# Patient Record
Sex: FEMALE
[De-identification: names, ages, dates, MRNs, and addresses within clinical notes are randomized per-mention and may not be internally consistent; named-entity substitution may affect disease eponyms.]

---

## 2017-07-29 ENCOUNTER — HOSPITAL ENCOUNTER (OUTPATIENT)
Dept: HOSPITAL 14 - H.ER | Age: 62
Setting detail: OBSERVATION
LOS: 3 days | Discharge: HOME | End: 2017-08-01
Attending: FAMILY MEDICINE | Admitting: FAMILY MEDICINE
Payer: MEDICARE

## 2017-07-29 DIAGNOSIS — F31.9: ICD-10-CM

## 2017-07-29 DIAGNOSIS — F41.1: ICD-10-CM

## 2017-07-29 DIAGNOSIS — Z79.899: ICD-10-CM

## 2017-07-29 DIAGNOSIS — Z91.14: ICD-10-CM

## 2017-07-29 DIAGNOSIS — E78.5: ICD-10-CM

## 2017-07-29 DIAGNOSIS — J45.901: Primary | ICD-10-CM

## 2017-07-29 DIAGNOSIS — Z82.5: ICD-10-CM

## 2017-07-29 DIAGNOSIS — Z82.41: ICD-10-CM

## 2017-07-29 DIAGNOSIS — I10: ICD-10-CM

## 2017-07-29 DIAGNOSIS — R07.89: ICD-10-CM

## 2017-07-29 DIAGNOSIS — Z83.3: ICD-10-CM

## 2017-07-29 DIAGNOSIS — Z82.49: ICD-10-CM

## 2017-07-29 DIAGNOSIS — E11.9: ICD-10-CM

## 2017-07-29 DIAGNOSIS — J44.9: ICD-10-CM

## 2017-07-29 LAB
ALBUMIN/GLOB SERPL: 1.3 {RATIO} (ref 1–2.1)
ALP SERPL-CCNC: 93 U/L (ref 38–126)
ALT SERPL-CCNC: 57 U/L (ref 9–52)
AST SERPL-CCNC: 38 U/L (ref 14–36)
BASE EXCESS BLDV CALC-SCNC: 3.8 MMOL/L (ref 0–2)
BASOPHILS # BLD AUTO: 0.1 K/UL (ref 0–0.2)
BASOPHILS NFR BLD: 0.8 % (ref 0–2)
BILIRUB SERPL-MCNC: 0.7 MG/DL (ref 0.2–1.3)
BUN SERPL-MCNC: 19 MG/DL (ref 7–17)
CALCIUM SERPL-MCNC: 9.9 MG/DL (ref 8.4–10.2)
CHLORIDE SERPL-SCNC: 102 MMOL/L (ref 98–107)
CO2 SERPL-SCNC: 28 MMOL/L (ref 22–30)
EOSINOPHIL # BLD AUTO: 0.6 K/UL (ref 0–0.7)
EOSINOPHIL NFR BLD: 6.5 % (ref 0–4)
ERYTHROCYTE [DISTWIDTH] IN BLOOD BY AUTOMATED COUNT: 12.8 % (ref 11.5–14.5)
GLOBULIN SER-MCNC: 3.3 GM/DL (ref 2.2–3.9)
GLUCOSE SERPL-MCNC: 159 MG/DL (ref 65–105)
HCT VFR BLD CALC: 41.9 % (ref 34–47)
LYMPHOCYTES # BLD AUTO: 2.8 K/UL (ref 1–4.3)
LYMPHOCYTES NFR BLD AUTO: 28.7 % (ref 20–40)
MCH RBC QN AUTO: 30 PG (ref 27–31)
MCHC RBC AUTO-ENTMCNC: 33.8 G/DL (ref 33–37)
MCV RBC AUTO: 88.9 FL (ref 81–99)
MONOCYTES # BLD: 0.7 K/UL (ref 0–0.8)
MONOCYTES NFR BLD: 7.6 % (ref 0–10)
NEUTROPHILS # BLD: 5.5 K/UL (ref 1.8–7)
NEUTROPHILS NFR BLD AUTO: 56.4 % (ref 50–75)
NRBC BLD AUTO-RTO: 0 % (ref 0–0)
PCO2 BLDV: 42 MMHG (ref 40–60)
PH BLDV: 7.44 [PH] (ref 7.32–7.43)
PLATELET # BLD: 315 K/UL (ref 130–400)
PMV BLD AUTO: 7.7 FL (ref 7.2–11.7)
POTASSIUM SERPL-SCNC: 4.4 MMOL/L (ref 3.6–5)
PROT SERPL-MCNC: 7.7 G/DL (ref 6.3–8.2)
SODIUM SERPL-SCNC: 139 MMOL/L (ref 132–148)
WBC # BLD AUTO: 9.8 K/UL (ref 4.8–10.8)

## 2017-07-29 PROCEDURE — 83036 HEMOGLOBIN GLYCOSYLATED A1C: CPT

## 2017-07-29 PROCEDURE — 84484 ASSAY OF TROPONIN QUANT: CPT

## 2017-07-29 PROCEDURE — 36415 COLL VENOUS BLD VENIPUNCTURE: CPT

## 2017-07-29 PROCEDURE — 80061 LIPID PANEL: CPT

## 2017-07-29 PROCEDURE — 96365 THER/PROPH/DIAG IV INF INIT: CPT

## 2017-07-29 PROCEDURE — 82803 BLOOD GASES ANY COMBINATION: CPT

## 2017-07-29 PROCEDURE — 82948 REAGENT STRIP/BLOOD GLUCOSE: CPT

## 2017-07-29 PROCEDURE — 94150 VITAL CAPACITY TEST: CPT

## 2017-07-29 PROCEDURE — 85025 COMPLETE CBC W/AUTO DIFF WBC: CPT

## 2017-07-29 PROCEDURE — 80053 COMPREHEN METABOLIC PANEL: CPT

## 2017-07-29 PROCEDURE — 93005 ELECTROCARDIOGRAM TRACING: CPT

## 2017-07-29 PROCEDURE — 96375 TX/PRO/DX INJ NEW DRUG ADDON: CPT

## 2017-07-29 PROCEDURE — 71010: CPT

## 2017-07-29 PROCEDURE — 99283 EMERGENCY DEPT VISIT LOW MDM: CPT

## 2017-07-29 PROCEDURE — 94640 AIRWAY INHALATION TREATMENT: CPT

## 2017-07-29 NOTE — ED PDOC
HPI: SOB/CHF/COPD


Time Seen by Provider: 17 20:00


Chief Complaint (Nursing): Respiratory Distress


Chief Complaint (Provider): Shortnss of Breath


History Per: Patient


History/Exam Limitations: no limitations


Onset/Duration Of Symptoms: Days (x7 days)


Current Symptoms Are (Timing): Still Present


Initiating Event: Out Of Medications (Out of dulera)


Associated Symptoms: Chest Pain.  denies: Fever, Chills, Productive Cough


Additional Complaint(s): 


Mary Jane Perrin, a 61 year old female, who has a past medical history of 

diabetes and asthma presents to the ED complaining of shortness of breath x7 

days. The patient states that she is out of dulera which is the medication she 

uses to keep her asthma under control, and now her asthma is out of control. 

She reports that she has been using her albuterol pump and nebulizer but they 

are not helping. Patient also notes a non productive cough associated with 

bilateral chest pain. Denies fever, chills.


 








Past Medical History


Reviewed: Historical Data, Nursing Documentation, Vital Signs


Vital Signs: 


 Last Vital Signs











Temp  98.3 F   17 22:11


 


Pulse  83   17 22:11


 


Resp  24   17 22:11


 


BP  146/76   17 19:52


 


Pulse Ox  96   17 21:47














- Medical History


PMH: Asthma, Diabetes





- Family History


Family History: States: Unknown Family Hx





- Allergies


Allergies/Adverse Reactions: 


 Allergies











Allergy/AdvReac Type Severity Reaction Status Date / Time


 


iodine Allergy  RASH Verified 17 19:57














Review of Systems


ROS Statement: Except As Marked, All Systems Reviewed And Found Negative


Constitutional: Negative for: Fever, Chills


Cardiovascular: Positive for: Chest Pain (Bilateral chest pain.)


Respiratory: Positive for: Cough (non productive cough), Shortness of Breath





Physical Exam





- Reviewed


Nursing Documentation Reviewed: Yes


Vital Signs Reviewed: Yes





- Physical Exam


Appears: Positive for: Non-toxic, No Acute Distress


Head Exam: Positive for: ATRAUMATIC, NORMAL INSPECTION, NORMOCEPHALIC


Skin: Positive for: Normal Color, Warm, Dry


Eye Exam: Positive for: Normal appearance, EOMI, PERRL


ENT: Positive for: Normal ENT Inspection


Neck: Positive for: Normal, Painless ROM, Supple


Cardiovascular/Chest: Positive for: Regular Rate, Rhythm, Chest Non Tender.  

Negative for: Tachycardia


Respiratory: Positive for: Decreased Breath Sounds, Wheezing (diffuse wheezing 

bilaterally; audible wheezing), Respiratory Distress (Mild respiratory distress.

)


Gastrointestinal/Abdominal: Positive for: Normal Exam


Back: Positive for: Normal Inspection


Neurologic/Psych: Positive for: Alert, Oriented, Gait





- Laboratory Results


Result Diagrams: 


 17 20:20





 17 20:20





- ECG


O2 Sat by Pulse Oximetry: 96 (RA)


Pulse Ox Interpretation: Normal





Medical Decision Making


Medical Decision Makin Initial Impression: 61 year old female presenting with asthma exacerbation





Initial Plan:


* ABG shock panel


* VBG shock panel


* EKG


* Comp Metabolic Panel


* Troponin


* CBC


* Chest x-ray


* Duoneb 3ml INH


* Magnesium Sulfate 2gm in 50ml IVPB


* Solu-medrol 125mg IVP


* Toradol 15mg IVP


* Peak flow pre/post .tx


* Reevaluation





2100


Pt. still wheezing considerably and has not improved significantly, although 

patient reports she's feeling better.  Still audibly wheezing and lung exam in 

unchanged.  Discussed case with Dr. Halina Sen who examined patient at 

bedside, patient to be admitted to [facility] for Asthma exacerbation.


___________________________________________________________________


Scribe Attestation


Documented by Lesley Rowe acting as a scribe for Dereck Sandoval MD.





Provider Attestation


All medical record entries made by the Scribe were at my direction and 

personally dictated by me. I have reviewed the chart and agree that the record 

accurately reflects my personal performance of the history, physical exam, 

medical decision making, and the department course for this patient. I have 

also personally directed, reviewed, and agree with the discharge instructions 

and disposition.





Disposition





- Clinical Impression


Clinical Impression: 


 Asthma exacerbation








- Disposition


Disposition Time: 21:05


Condition: STABLE


Forms:  Xceive (English)

## 2017-07-29 NOTE — CP.PCM.HP
History of Present Illness





- History of Present Illness


History of Present Illness: 


61 yr old F with PMHx including Asthma, DM Type 2, Hyperlipidemia and Bipolar 

Disorder presented to the ED with worsening SOB and wheezing x 1 week after she 

ran out of her Dulera (corticosteroid inhaler). She has associated symptom of 

non-productive cough and chest pain. Denies fever, palpitations, weakness or 

dizziness. Reports using her albuterol inhaler and nebulizer but it hasn't 

helped much. 





PMD: Dr. Malloy





PMHx: Asthma, DM Type 2, Hyperlipidemia and Bipolar Disorder


SurgHx: Hysterectomy, Lipoma removed from abd LUQ, Oophorectomy, Cardiac Cath


FHx: Mother passed away at 84-cardiac arrest/DM Type 2; Father passed away at 70

-liver failure/hepatitis, siblings with HTN and asthma


SocHx: denies smoking/Etoh/drugs; lives alone, daughter lives in [place], 

son lives in [place] -Adithya Aguilarez [phone]


Medications: Dulera 200mcg/5mcg BID, Ventolin 90mcg , Montelukast 10mg PO QD, 

Omeprazole 20mg PO BID, HCTZ 25mg PO QD, Lisinopril 40mg PO QD, Metformin 500mg 

PO BID


Allergies: iodine (contrast)-causes rash





ED course


-VS:/76 mmHg, , T 98 F, RR 23, O2 sat 96% on room air


-EKG: NSR


-CXR: pulmonary congestion, official report pending


-CBC wnl, troponin neg x 1, alk phos 93


-CMP: wnl, AST/ALT 38/57


-vBG: pO2 40, pH 7.44, pCO2 42, HCO3 27.2, Lactate 1.5


-ED tx: Duoneb x 3, Mag 2gm IV once, Toradol 15mg IV once, Solu-medrol 125mg IV 

once











Present on Admission





- Present on Admission


Any Indicators Present on Admission: No


History of DVT/PE: No


History of Uncontrolled Diabetes: No


Urinary Catheter: No


Decubitus Ulcer Present: No





Review of Systems





- Review of Systems


All systems: reviewed and no additional remarkable complaints except (for what 

is mentioned in the HPI)





Meds


Allergies/Adverse Reactions: 


 Allergies











Allergy/AdvReac Type Severity Reaction Status Date / Time


 


iodine Allergy  RASH Verified 07/29/17 19:57














Physical Exam





- Constitutional


Appears: Other (in mild respiratory distress)





- Head Exam


Head Exam: ATRAUMATIC, NORMOCEPHALIC





- Eye Exam


Eye Exam: EOMI, PERRL





- ENT Exam


ENT Exam: Mucous Membranes Moist





- Neck Exam


Neck exam: Positive for: Full Rom.  Negative for: Lymphadenopathy





- Respiratory Exam


Respiratory Exam: Wheezes (diffuse, audible), Respiratory Distress (mild)





- Cardiovascular Exam


Cardiovascular Exam: REGULAR RHYTHM, +S1, +S2





- GI/Abdominal Exam


GI & Abdominal Exam: Normal Bowel Sounds, Soft (obese).  absent: Tenderness





- Extremities Exam


Extremities exam: Positive for: full ROM.  Negative for: calf tenderness, pedal 

edema





- Back Exam


Back exam: absent: CVA tenderness (L), CVA tenderness (R)





- Neurological Exam


Neurological exam: Alert, CN II-XII Intact, Oriented x3





- Psychiatric Exam


Psychiatric exam: Normal Affect, Normal Mood





- Skin


Skin Exam: Dry, Intact, Warm





Results





- Vital Signs


Recent Vital Signs: 





 Last Vital Signs











Temp  98 F   07/29/17 19:52


 


Pulse  103 H  07/29/17 19:52


 


Resp  23   07/29/17 19:52


 


BP  146/76   07/29/17 19:52


 


Pulse Ox  96   07/29/17 21:16














- Labs


Result Diagrams: 


 07/29/17 20:20





 07/29/17 20:20


Labs: 





 Laboratory Results - last 24 hr











  07/29/17 07/29/17 07/29/17





  20:20 20:20 20:39


 


WBC  9.8  


 


RBC  4.71  


 


Hgb  14.1  


 


Hct  41.9  


 


MCV  88.9  


 


MCH  30.0  


 


MCHC  33.8  


 


RDW  12.8  


 


Plt Count  315  


 


MPV  7.7  


 


Neut % (Auto)  56.4  


 


Lymph % (Auto)  28.7  


 


Mono % (Auto)  7.6  


 


Eos % (Auto)  6.5 H  


 


Baso % (Auto)  0.8  


 


Neut #  5.5  


 


Lymph #  2.8  


 


Mono #  0.7  


 


Eos #  0.6  


 


Baso #  0.1  


 


pO2    40


 


VBG pH    7.44 H


 


VBG pCO2    42


 


VBG HCO3    27.2


 


VBG Total CO2    29.8 H


 


VBG O2 Sat (Calc)    78.7 H


 


VBG Base Excess    3.8 H


 


VBG Potassium    3.6


 


Glucose    167 H


 


Lactate    1.5


 


FiO2    21.0


 


Sodium   139  137.0


 


Potassium   4.4 


 


Chloride   102  103.0


 


Carbon Dioxide   28 


 


Anion Gap   13 


 


BUN   19 H 


 


Creatinine   0.8 


 


Est GFR ( Amer)   > 60 


 


Est GFR (Non-Af Amer)   > 60 


 


Random Glucose   159 H 


 


Calcium   9.9 


 


Total Bilirubin   0.7 


 


AST   38 H 


 


ALT   57 H 


 


Alkaline Phosphatase   93 


 


Troponin I   < 0.0120 


 


Total Protein   7.7 


 


Albumin   4.4 


 


Globulin   3.3 


 


Albumin/Globulin Ratio   1.3 


 


Venous Blood Potassium    3.6














Assessment & Plan





- Assessment and Plan (Free Text)


Assessment: 


61 yr old F admitted for Asthma exacerbation with PMHx of Asthma, DM Type 2, 

Hyperlipidemia and Bipolar Disorder. Patient continued to have audible wheezing 

and mild respiratory distress despite ED tx with duoneb, IV solumedrol and IV 

Mag. 





Asthma Exacerbation


-acute, due to medication non-compliance


-Admit to tele for further management


-Albuterol 3mL INH Q4


-Methylprednisone 60mg IV Q12H


-Montelukast 10mg PO QD


-supplemental O2 via nasal cannula to keep O2 sat> 92%





Diabetes Type 2


-controlled, chronic


-Metformin 500mg PO BID


-accucheck ACHS


-f/u HbA1c, lipid panel





Bipolar disorder


-stable, chronic


-not on home meds





DVT prophylaxis


-Lovenox 40mg SC QD











- Date & Time


Date: 07/30/17


Time: 21:30

## 2017-07-30 LAB
ALBUMIN/GLOB SERPL: 1.3 {RATIO} (ref 1–2.1)
ALP SERPL-CCNC: 93 U/L (ref 38–126)
ALT SERPL-CCNC: 42 U/L (ref 9–52)
AST SERPL-CCNC: 32 U/L (ref 14–36)
BASOPHILS # BLD AUTO: 0 K/UL (ref 0–0.2)
BASOPHILS NFR BLD: 0.3 % (ref 0–2)
BILIRUB SERPL-MCNC: 0.8 MG/DL (ref 0.2–1.3)
BUN SERPL-MCNC: 24 MG/DL (ref 7–17)
CALCIUM SERPL-MCNC: 9.8 MG/DL (ref 8.4–10.2)
CHLORIDE SERPL-SCNC: 99 MMOL/L (ref 98–107)
CHOLEST SERPL-MCNC: 279 MG/DL (ref 0–199)
CO2 SERPL-SCNC: 26 MMOL/L (ref 22–30)
EOSINOPHIL # BLD AUTO: 0 K/UL (ref 0–0.7)
EOSINOPHIL NFR BLD: 0.1 % (ref 0–4)
ERYTHROCYTE [DISTWIDTH] IN BLOOD BY AUTOMATED COUNT: 13.3 % (ref 11.5–14.5)
GLOBULIN SER-MCNC: 3.2 GM/DL (ref 2.2–3.9)
GLUCOSE SERPL-MCNC: 383 MG/DL (ref 65–105)
HCT VFR BLD CALC: 40.3 % (ref 34–47)
LYMPHOCYTES # BLD AUTO: 0.8 K/UL (ref 1–4.3)
LYMPHOCYTES NFR BLD AUTO: 10.8 % (ref 20–40)
MCH RBC QN AUTO: 30.6 PG (ref 27–31)
MCHC RBC AUTO-ENTMCNC: 34 G/DL (ref 33–37)
MCV RBC AUTO: 90.2 FL (ref 81–99)
MONOCYTES # BLD: 0.1 K/UL (ref 0–0.8)
MONOCYTES NFR BLD: 1 % (ref 0–10)
NEUTROPHILS # BLD: 6.7 K/UL (ref 1.8–7)
NEUTROPHILS NFR BLD AUTO: 87.8 % (ref 50–75)
NRBC BLD AUTO-RTO: 0 % (ref 0–0)
PLATELET # BLD: 280 K/UL (ref 130–400)
PMV BLD AUTO: 8.6 FL (ref 7.2–11.7)
POTASSIUM SERPL-SCNC: 4.8 MMOL/L (ref 3.6–5)
PROT SERPL-MCNC: 7.3 G/DL (ref 6.3–8.2)
SODIUM SERPL-SCNC: 136 MMOL/L (ref 132–148)
WBC # BLD AUTO: 7.6 K/UL (ref 4.8–10.8)

## 2017-07-30 RX ADMIN — PANTOPRAZOLE SODIUM SCH MG: 40 TABLET, DELAYED RELEASE ORAL at 09:38

## 2017-07-30 RX ADMIN — ENOXAPARIN SODIUM SCH MG: 40 INJECTION SUBCUTANEOUS at 09:38

## 2017-07-30 RX ADMIN — IPRATROPIUM BROMIDE AND ALBUTEROL SULFATE SCH ML: .5; 3 SOLUTION RESPIRATORY (INHALATION) at 11:25

## 2017-07-30 RX ADMIN — IPRATROPIUM BROMIDE AND ALBUTEROL SULFATE SCH ML: .5; 3 SOLUTION RESPIRATORY (INHALATION) at 07:34

## 2017-07-30 RX ADMIN — METHYLPREDNISOLONE SODIUM SUCCINATE SCH MLS/HR: 125 INJECTION, POWDER, FOR SOLUTION INTRAMUSCULAR; INTRAVENOUS at 16:52

## 2017-07-30 RX ADMIN — IPRATROPIUM BROMIDE AND ALBUTEROL SULFATE SCH ML: .5; 3 SOLUTION RESPIRATORY (INHALATION) at 00:46

## 2017-07-30 RX ADMIN — IPRATROPIUM BROMIDE AND ALBUTEROL SULFATE SCH ML: .5; 3 SOLUTION RESPIRATORY (INHALATION) at 16:09

## 2017-07-30 RX ADMIN — IPRATROPIUM BROMIDE AND ALBUTEROL SULFATE SCH ML: .5; 3 SOLUTION RESPIRATORY (INHALATION) at 19:45

## 2017-07-30 NOTE — RAD
PROCEDURE:  CHEST RADIOGRAPH, 1 VIEW



HISTORY:

asthma, cough



COMPARISON:

None available.



FINDINGS:



LUNGS:

Clear.



PLEURA:

No pneumothorax or pleural fluid seen.



CARDIOVASCULAR:

Normal.



OSSEOUS STRUCTURES:

No significant abnormalities.



VISUALIZED UPPER ABDOMEN:

Normal.



OTHER FINDINGS:

None. 



IMPRESSION:

No active disease.

## 2017-07-30 NOTE — CP.PCM.PN
Subjective





- Date & Time of Evaluation


Date of Evaluation: 07/30/17


Time of Evaluation: 08:15





- Subjective


Subjective: 





Pt was seen and evaluated at bedside, appeared in no acute distress, stated 

that her breathing has much improved since yesterday when she came into the ED. 

It was explained to pt that because she is getting steroids to help with her 

breathing, that her blood sugar will likely increase and she may require 

insulin to control it during the hospital stay. Pt verbalized understanding and 

agreed. She also complained of burning sensation "in the whole body," and 

stated that she has had this happen before, and that it has been happening for 2

-3 days. Stated that taking omeprazole helps control the burning sensation in 

her abdomen, and that drinking water helped as well. No signs of allergic 

reaction to any medication pt has received this admission, no sign of allergic 

reaction in general.  Denied chest pain, abdominal pain, rash/hives, calf/leg 

pain/swelling.





Objective





- Vital Signs/Intake and Output


Vital Signs (last 24 hours): 


 











Temp Pulse Resp BP Pulse Ox


 


 97.6 F   85   18   137/84   95 


 


 07/30/17 12:00  07/30/17 12:00  07/30/17 12:00  07/30/17 12:00  07/30/17 12:00











- Medications


Medications: 


 Current Medications





Acetaminophen (Tylenol 325mg Tab)  650 mg PO Q6 PRN


   PRN Reason: Pain, Mild (1-3)


Albuterol/Ipratropium (Duoneb 3 Mg/0.5 Mg (3 Ml) Ud)  3 ml INH RQID [facility]


   Last Admin: 07/30/17 11:25 Dose:  3 ml


Dextrose (Dextrose 50% Inj)  0 ml IV STAT PRN; Protocol


   PRN Reason: Hyglycemia Protocol


Dextrose (Glutose 15)  0 gm PO ONCE PRN; Protocol


   PRN Reason: Hypoglycemia Protocol


Enoxaparin Sodium (Lovenox)  40 mg SC DAILY [facility]


   PRN Reason: Protocol


   Last Admin: 07/30/17 09:38 Dose:  40 mg


Glucagon (Glucagen Diagnostic Kit)  0 mg IM STAT PRN; Protocol


   PRN Reason: Hypoglycemia Protocol


Hydrochlorothiazide (Hydrodiuril)  25 mg PO DAILY [facility]


   Last Admin: 07/30/17 09:31 Dose:  25 mg


Methylprednisolone 80 mg/ (Sodium Chloride)  50 mls @ 100 mls/hr IVPB Q8 [facility]


Insulin Human Regular (Humulin R)  0 units SC ACHS [facility]


   PRN Reason: Protocol


Lisinopril (Zestril)  40 mg PO DAILY [facility]


   Last Admin: 07/30/17 09:31 Dose:  40 mg


Metformin HCl (Glucophage)  500 mg PO BIDWM [facility]


   Last Admin: 07/30/17 09:31 Dose:  500 mg


Montelukast Sodium (Singulair)  10 mg PO DAILY [facility]


   Last Admin: 07/30/17 09:31 Dose:  10 mg


Pantoprazole Sodium (Protonix Ec Tab)  40 mg PO DAILY [facility]


   Last Admin: 07/30/17 09:38 Dose:  40 mg











- Constitutional


Appears: No Acute Distress





- Head Exam


Head Exam: ATRAUMATIC, NORMAL INSPECTION





- Eye Exam


Eye Exam: EOMI, Normal appearance





- ENT Exam


ENT Exam: Mucous Membranes Moist





- Neck Exam


Neck Exam: Full ROM, Normal Inspection





- Respiratory Exam


Respiratory Exam: NORMAL BREATHING PATTERN.  absent: Accessory Muscle Use, 

Respiratory Distress


Additional comments: 





slightly decreased breath sounds at bases bilaterally








- Cardiovascular Exam


Cardiovascular Exam: REGULAR RHYTHM, +S1, +S2





- GI/Abdominal Exam


GI & Abdominal Exam: Soft, Normal Bowel Sounds





- Extremities Exam


Extremities Exam: Normal Capillary Refill, Normal Inspection.  absent: Calf 

Tenderness, Pedal Edema





- Back Exam


Back Exam: NORMAL INSPECTION





- Neurological Exam


Neurological Exam: Alert, Awake, Oriented x3





- Psychiatric Exam


Psychiatric exam: Normal Mood





- Skin


Skin Exam: Dry, Intact, Warm





Assessment and Plan





- Assessment and Plan (Free Text)


Assessment: 








60 yo old F with PMH asthma, HTN, DM2, HLD, bipolar disorder admitted for 

asthma exacerbation. Pt symptoms have improved since admission, she is no 

longer in any respiratory distress, and is continuing to receive 

methylprednisone, which is helping improve symptoms. 


Plan: 





 


1) Asthma Exacerbation


-Acute, due to medication noncompliance since pt has not refilled meds


-Continue with methylprednisone 80mg IV Q8H


-Continue with montelukast 10mg PO QD


-Continue with albuterol 3mL INH Q4


-Monitor for respiratory distress





2) Diabetes Type 2


-Continue home meds: metformin 500mg PO BID


-Monitor blood glucose with accucheck 


-Insulin coverage scale since pt is getting methylprednisone and blood glucose 

can spike. Pt is insulin naive.


-Hyperglycemia protocol


-F/u HbA1c, lipid panel





3) Hypertension


-Controlled


-Continue with home meds: Lisinopril 40mg PO daily, HCTZ 25 mg PO daily





4) Hyperlipidemia


-Not on meds as per pcp due to elevated lft


-F/u with outpt pcp





5) GERD


-Continue pantoprazole 40mg PO daily





6) Bipolar disorder


-Stable, chronic


-Not on home meds





7) DVT prophylaxis


-Lovenox 40mg SC QD

## 2017-07-30 NOTE — CARD
--------------- APPROVED REPORT --------------





EKG Measurement

Heart [id number]

NH [id number]

[id number]

[id number]

[id number]



<Conclusion>

Normal sinus rhythm

Normal ECG

## 2017-07-31 RX ADMIN — ENOXAPARIN SODIUM SCH MG: 40 INJECTION SUBCUTANEOUS at 09:23

## 2017-07-31 RX ADMIN — IPRATROPIUM BROMIDE AND ALBUTEROL SULFATE SCH ML: .5; 3 SOLUTION RESPIRATORY (INHALATION) at 12:30

## 2017-07-31 RX ADMIN — PANTOPRAZOLE SODIUM SCH MG: 40 TABLET, DELAYED RELEASE ORAL at 09:23

## 2017-07-31 RX ADMIN — IPRATROPIUM BROMIDE AND ALBUTEROL SULFATE SCH ML: .5; 3 SOLUTION RESPIRATORY (INHALATION) at 15:35

## 2017-07-31 RX ADMIN — METHYLPREDNISOLONE SODIUM SUCCINATE SCH MLS/HR: 125 INJECTION, POWDER, FOR SOLUTION INTRAMUSCULAR; INTRAVENOUS at 16:47

## 2017-07-31 RX ADMIN — IPRATROPIUM BROMIDE AND ALBUTEROL SULFATE SCH ML: .5; 3 SOLUTION RESPIRATORY (INHALATION) at 19:04

## 2017-07-31 RX ADMIN — METHYLPREDNISOLONE SODIUM SUCCINATE SCH MLS/HR: 125 INJECTION, POWDER, FOR SOLUTION INTRAMUSCULAR; INTRAVENOUS at 01:08

## 2017-07-31 RX ADMIN — METHYLPREDNISOLONE SODIUM SUCCINATE SCH MLS/HR: 125 INJECTION, POWDER, FOR SOLUTION INTRAMUSCULAR; INTRAVENOUS at 09:22

## 2017-07-31 RX ADMIN — IPRATROPIUM BROMIDE AND ALBUTEROL SULFATE SCH ML: .5; 3 SOLUTION RESPIRATORY (INHALATION) at 08:00

## 2017-07-31 NOTE — CP.PCM.PN
Subjective





- Date & Time of Evaluation


Date of Evaluation: 07/31/17


Time of Evaluation: 07:05





- Subjective


Subjective: 








Patient was seen and evaluated at bedside, was observed sleeping comfortably in 

bed earlier in the morning. Reports that her breathing has improved since 

admission, has no dyspnea at rest, but reports feeling a tightness in her chest 

upon ambulation. Offers no other complaints at this time. Denies chest pain, 

worsening sob, abdominal pain, leg/calf pain/swelling. 








Of note, patient has two medical records in [facility]. Other record has birthday 

listed as 1955.











Objective





- Vital Signs/Intake and Output


Vital Signs (last 24 hours): 


 











Temp Pulse Resp BP Pulse Ox


 


 97.8 F   74   18   121/77   95 


 


 07/31/17 08:00  07/31/17 09:23  07/31/17 08:00  07/31/17 09:23  07/31/17 08:00








Intake and Output: 


 











 07/31/17 07/31/17





 06:59 18:59


 


Intake Total 450 


 


Balance 450 














- Medications


Medications: 


 Current Medications





Acetaminophen (Tylenol 325mg Tab)  650 mg PO Q6 PRN


   PRN Reason: Pain, Mild (1-3)


Albuterol/Ipratropium (Duoneb 3 Mg/0.5 Mg (3 Ml) Ud)  3 ml INH RQID [facility]


   Last Admin: 07/31/17 08:00 Dose:  3 ml


Dextrose (Dextrose 50% Inj)  0 ml IV STAT PRN; Protocol


   PRN Reason: Hyglycemia Protocol


Dextrose (Glutose 15)  0 gm PO ONCE PRN; Protocol


   PRN Reason: Hypoglycemia Protocol


Enoxaparin Sodium (Lovenox)  40 mg SC DAILY [facility]


   PRN Reason: Protocol


   Last Admin: 07/31/17 09:23 Dose:  40 mg


Glucagon (Glucagen Diagnostic Kit)  0 mg IM STAT PRN; Protocol


   PRN Reason: Hypoglycemia Protocol


Hydrochlorothiazide (Hydrodiuril)  25 mg PO DAILY [facility]


   Last Admin: 07/31/17 09:22 Dose:  25 mg


Methylprednisolone 80 mg/ (Sodium Chloride)  50 mls @ 100 mls/hr IVPB Q8 [facility]


   Last Admin: 07/31/17 09:22 Dose:  100 mls/hr


Insulin Human Regular (Humulin R)  0 units SC ACHS [facility]


   PRN Reason: Protocol


   Last Admin: 07/31/17 06:51 Dose:  5 units


Lisinopril (Zestril)  40 mg PO DAILY [facility]


   Last Admin: 07/31/17 09:23 Dose:  40 mg


Metformin HCl (Glucophage)  500 mg PO BIDWM [facility]


   Last Admin: 07/31/17 09:23 Dose:  500 mg


Montelukast Sodium (Singulair)  10 mg PO DAILY [facility]


   Last Admin: 07/31/17 09:22 Dose:  10 mg


Pantoprazole Sodium (Protonix Ec Tab)  40 mg PO DAILY [facility]


   Last Admin: 07/31/17 09:23 Dose:  40 mg











- Constitutional


Appears: Non-toxic, No Acute Distress





- Head Exam


Head Exam: NORMAL INSPECTION





- Eye Exam


Eye Exam: EOMI





- ENT Exam


ENT Exam: Mucous Membranes Moist





- Respiratory Exam


Respiratory Exam: Clear to Ausculation Bilateral, NORMAL BREATHING PATTERN.  

absent: Wheezes





- Cardiovascular Exam


Cardiovascular Exam: REGULAR RHYTHM, +S1, +S2





- GI/Abdominal Exam


GI & Abdominal Exam: Soft, Normal Bowel Sounds





- Extremities Exam


Extremities Exam: absent: Calf Tenderness





- Back Exam


Back Exam: NORMAL INSPECTION





- Neurological Exam


Neurological Exam: Alert, Awake, Oriented x3





- Psychiatric Exam


Psychiatric exam: Normal Mood





- Skin


Skin Exam: Dry, Intact, Normal Color, Warm





Assessment and Plan





- Assessment and Plan (Free Text)


Assessment: 





62 yo old F with PMH asthma, HTN, DM2, HLD, bipolar disorder admitted for 

asthma exacerbation. Pt symptoms have improved since admission, she is no 

longer in respiratory distress, but is experiencing some chest tightness upon 

excretion, and is continuing to receive methylprednisone, which is helping 

improve symptoms.


Plan: 











1) Asthma Exacerbation


-Acute, due to medication noncompliance since pt has not refilled meds


-Asthma- mild persistent


-Improving


-Continue with methylprednisone, decrease dose to 60mg IV Q8H


-Continue with montelukast 10mg PO QD


-Continue with albuterol 3mL INH Q4


-Monitor for respiratory distress





2) Diabetes Type 2


-Continue home meds: metformin 500mg PO BID


-Monitor blood glucose with accucheck 


-Insulin coverage scale since pt is getting methylprednisone and blood glucose 

can spike. Pt is insulin naive.


-Hyperglycemia protocol


-A1C 7.2, last known 8.1 in 9/2016 as per eCW





3) Hypertension


-Controlled


-Continue with home meds: Lisinopril 40mg PO daily, HCTZ 25 mg PO daily





4) Hyperlipidemia


-Not on meds as per pcp due to elevated LFTs in the past


-Cholesterol 279, , HDL 47


-F/u with outpt pcp





5) GERD


-Continue pantoprazole 40mg PO daily





6) Bipolar disorder


-Stable, chronic


-Not on home meds





7) DVT prophylaxis


-Lovenox 40mg SC QD

## 2017-08-01 VITALS
SYSTOLIC BLOOD PRESSURE: 125 MMHG | DIASTOLIC BLOOD PRESSURE: 65 MMHG | RESPIRATION RATE: 20 BRPM | HEART RATE: 75 BPM | OXYGEN SATURATION: 95 % | TEMPERATURE: 97.6 F

## 2017-08-01 RX ADMIN — IPRATROPIUM BROMIDE AND ALBUTEROL SULFATE SCH ML: .5; 3 SOLUTION RESPIRATORY (INHALATION) at 11:05

## 2017-08-01 RX ADMIN — IPRATROPIUM BROMIDE AND ALBUTEROL SULFATE SCH ML: .5; 3 SOLUTION RESPIRATORY (INHALATION) at 07:25

## 2017-08-01 RX ADMIN — PANTOPRAZOLE SODIUM SCH MG: 40 TABLET, DELAYED RELEASE ORAL at 09:28

## 2017-08-01 RX ADMIN — METHYLPREDNISOLONE SODIUM SUCCINATE SCH MLS/HR: 125 INJECTION, POWDER, FOR SOLUTION INTRAMUSCULAR; INTRAVENOUS at 00:20

## 2017-08-01 RX ADMIN — IPRATROPIUM BROMIDE AND ALBUTEROL SULFATE SCH ML: .5; 3 SOLUTION RESPIRATORY (INHALATION) at 15:51

## 2017-08-01 RX ADMIN — ENOXAPARIN SODIUM SCH MG: 40 INJECTION SUBCUTANEOUS at 09:26

## 2017-08-01 RX ADMIN — METHYLPREDNISOLONE SODIUM SUCCINATE SCH MLS/HR: 125 INJECTION, POWDER, FOR SOLUTION INTRAMUSCULAR; INTRAVENOUS at 09:25

## 2017-08-01 NOTE — PCM.RRTMUL
RRT Nurse Assessment





- Vital Signs


Blood Pressure:: 124/79


Pulse Rate:: 61





- Phillips Coma Scale


Coma Scale Eye Opening:: Spontaneous


Coma Scale Motor:: Obeys Commands Movement


Coma Scale Verbal:: Oriented


Coma Scale Total:: 15

## 2017-08-01 NOTE — PCM.RRTMUL
RRT Nurse Assessment





- Situation


RRT Responder Arrival Time:: 09:36


Location:: [address] [place] [address]


Room Number:: 402-2


RRT Reason for Call: Chest Pain, Respiratory Distress


RRT Called By: RN





- IV


IV Inserted during RRT?: No





- Respiratory


Oxygen Delivery Method:: Nasal Cannula


Received Nebulizer Treatments:: No


Was the Patient Ventilated with Bag/Mask 100% O2?: No


Secretions Suctioned?: No


Was the Patient Intubated?: No


Was the Patient Placed on a Ventilator?: No





- Diagnostic Test Ordered


EKG:: Yes


CPR started during RRT?: No





- Vital Signs


Blood Pressure:: 125/65


Pulse Rate:: 75


Respiratory Rate:: 20


Temperature:: 97.6 F





- Shraddha Coma Scale


Coma Scale Eye Opening:: Spontaneous


Coma Scale Motor:: Obeys Commands Movement


Coma Scale Verbal:: Oriented


Coma Scale Total:: 15





- Time RRT Ended


Time RRT Ended:: 09:50





- Vital Signs at end of RRT


Blood Pressure:: 171/84


Pulse Rate:: 88


Respiratory Rate:: 22


Temperature:: 97.4 F


O2 Sat by Pulse Oximetry:: 95





- Recommendations


5) RRT Level of Care Recommendations: Remain in current setting


6) Notifications: Attending Physician





I.Reason for RRT





- A) Acute Change in Patient:


(Select all that apply): Staff member or family is worried about patient (RRT 

called on 62 y/o female with PMH HTN, DM asthma for midsternal chest pain and 

dyspnea)





- B) Neurological Status


(Select all that apply): Alert, Responsive, Oriented





- C) Respiratory


Oxygen Delivery Method: Nasal Cannula @L/min (2 )





- Constitutional


Appears: Non-toxic





- Head


Head Exam: ATRAUMATIC, NORMAL INSPECTION, NORMOCEPHALIC





- Eyes


Eye Exam: EOMI, Normal appearance, PERRL





- Respiratory Exam


Respiratory Exam: Clear to Ausculation Bilateral, NORMAL BREATHING PATTERN.  

absent: Rales, Rhonchi, Wheezes





- Cardiovascular Exam


Cardiovascular Exam: REGULAR RHYTHM, RRR, +S1, +S2.  absent: JVD





- GI/Abdominal Exam


GI & Abdominal Exam: Soft, Normal Bowel Sounds.  absent: Distended, Guarding, 

Tenderness, Rebound





- Neurological Exam


Neurological Exam: Alert, Awake, CN II-XII Intact, Normal Gait, Oriented x3





- Extremities Exam


Extremities Exam: Full ROM, Normal Capillary Refill, Normal Inspection





Plan





- B. Assessment of Findings&Treatment Plan





Patient placed on 2 L O2 via NC with O2 sat 100% 


EKG showed NSR with no ST-T wave changes 


Physical exam with good air breaths bilaterally  with no wheezing no rhonchi








DX most likely anxiety attack 


Continue monitoring in telemetry


primary team informed

## 2017-08-01 NOTE — CP.PCM.PN
Subjective





- Date & Time of Evaluation


Date of Evaluation: 08/01/17


Time of Evaluation: 09:01





- Subjective


Subjective: 


 Patient was seen and examined at the bedside. Denies SOB and states that her 

breathing is back to baseline. Complains of a non productive cough that she has 

had for the past 3 days with associated chest tightness that has been improving.








Objective





- Vital Signs/Intake and Output


Vital Signs (last 24 hours): 


 











Temp Pulse Resp BP Pulse Ox


 


 98.0 F   61   18   124/79   94 L


 


 08/01/17 08:00  08/01/17 08:00  08/01/17 08:00  08/01/17 08:00  08/01/17 08:00











- Medications


Medications: 


 Current Medications





Acetaminophen (Tylenol 325mg Tab)  650 mg PO Q6 PRN


   PRN Reason: Pain, Mild (1-3)


   Last Admin: 07/31/17 10:05 Dose:  650 mg


Albuterol/Ipratropium (Duoneb 3 Mg/0.5 Mg (3 Ml) Ud)  3 ml INH RQID [facility]


   Last Admin: 08/01/17 07:25 Dose:  3 ml


Dextrose (Dextrose 50% Inj)  0 ml IV STAT PRN; Protocol


   PRN Reason: Hyglycemia Protocol


Dextrose (Glutose 15)  0 gm PO ONCE PRN; Protocol


   PRN Reason: Hypoglycemia Protocol


Enoxaparin Sodium (Lovenox)  40 mg SC DAILY MICHAEL


   PRN Reason: Protocol


   Last Admin: 07/31/17 09:23 Dose:  40 mg


Glucagon (Glucagen Diagnostic Kit)  0 mg IM STAT PRN; Protocol


   PRN Reason: Hypoglycemia Protocol


Hydrochlorothiazide (Hydrodiuril)  25 mg PO DAILY [facility]


   Last Admin: 07/31/17 09:22 Dose:  25 mg


Methylprednisolone 60 mg/ (Sodium Chloride)  50 mls @ 100 mls/hr IVPB Q8 [facility]


   Last Admin: 08/01/17 00:20 Dose:  100 mls/hr


Insulin Human Regular (Humulin R)  0 units SC ACHS MICHAEL


   PRN Reason: Protocol


   Last Admin: 08/01/17 06:48 Dose:  5 units


Lisinopril (Zestril)  40 mg PO DAILY [facility]


   Last Admin: 07/31/17 09:23 Dose:  40 mg


Metformin HCl (Glucophage)  500 mg PO BIDWM [facility]


   Last Admin: 07/31/17 16:50 Dose:  500 mg


Montelukast Sodium (Singulair)  10 mg PO DAILY [facility]


   Last Admin: 07/31/17 09:22 Dose:  10 mg


Pantoprazole Sodium (Protonix Ec Tab)  40 mg PO DAILY [facility]


   Last Admin: 07/31/17 09:23 Dose:  40 mg











- ENT Exam


ENT Exam: Mucous Membranes Moist





- Neck Exam


Neck Exam: absent: Thyromegaly





- Respiratory Exam


Respiratory Exam: Clear to Ausculation Bilateral.  absent: Accessory Muscle Use

, Chest Wall Tenderness, Wheezes, Respiratory Distress





- Cardiovascular Exam


Cardiovascular Exam: REGULAR RHYTHM, +S1, +S2.  absent: Murmur





- Extremities Exam


Extremities Exam: absent: Calf Tenderness


Additional comments: 


 Dilated and tortous veins diffusley scattered on both limbs (chronic), 


(New) Dilated  and tortous veins on Right medial thigh





- Skin


Skin Exam: Normal Color.  absent: Rash, Urticaria, Warm





Assessment and Plan





- Assessment and Plan (Free Text)


Assessment: 


Assessment: 





62 yo old F with PMH asthma, HTN, DM2, HLD, bipolar disorder admitted for 

asthma exacerbation. Pt is no longer in repiratory disress and denies any 

difficulty breathing or wheezing. She is still experiencing chest tightness and 

a nonproductive cough, although it has improved from admission. 





Plan: 





1) Asthma Exacerbation


-Resolved, no SOB, Wheezing, mild residual chest tightness and cough. Cough is 

non productive, patient is afebrile with good O2 saturation, latest Cxray was 

negative of consolidation (7/20) so it is likely a viral cold


-Pt has a hx of Asthma- mild persistent


-Currently on Methylprednisone 60mg IV Q8H but will change to PO steroids on D/C


-Continue with montelukast 10mg PO QD


-Continue with albuterol 3mL INH Q4


-Monitor for respiratory distress





2) Diabetes Type 2


-Continue home meds: metformin 500mg PO BID


-Monitor blood glucose with accucheck 


-Insulin coverage scale since pt is getting methylprednisone and blood glucose 

can spike. Pt is insulin naive.


-Hyperglycemia protocol


-A1C 7.2, last known 8.1 in 9/2016 as per eCW





3) Hypertension


-Controlled


-Continue with home meds: Lisinopril 40mg PO daily, HCTZ 25 mg PO daily





4) Hyperlipidemia


-Not on meds as per pcp due to elevated LFTs in the past


-Cholesterol 279, , HDL 47


-F/u with outpt pcp





5) GERD


-Continue pantoprazole 40mg PO daily





6) Bipolar disorder


-Stable, chronic


-Not on home meds





7) DVT prophylaxis


-Lovenox 40mg SC QD

## 2017-08-01 NOTE — CP.PCM.DIS
Provider





- Provider


Date of Admission: 


07/30/17 12:04





Attending physician: 


Kassidy Sheridan MD





Time Spent in preparation of Discharge (in minutes): 30





Diagnosis





- Discharge Diagnosis


(1) Asthma exacerbation


Status: Resolved   





Hospital Course





- Lab Results


Lab Results: 


 Most Recent Lab Values











WBC  7.6 K/uL (4.8-10.8)   07/30/17  07:00    


 


RBC  4.47 Mil/uL (3.80-5.20)   07/30/17  07:00    


 


Hgb  13.7 g/dL (12.0-16.0)   07/30/17  07:00    


 


Hct  40.3 % (34.0-47.0)   07/30/17  07:00    


 


MCV  90.2 fl (81.0-99.0)   07/30/17  07:00    


 


MCH  30.6 pg (27.0-31.0)   07/30/17  07:00    


 


MCHC  34.0 g/dL (33.0-37.0)   07/30/17  07:00    


 


RDW  13.3 % (11.5-14.5)   07/30/17  07:00    


 


Plt Count  280 K/uL (130-400)   07/30/17  07:00    


 


MPV  8.6 fl (7.2-11.7)   07/30/17  07:00    


 


Neut % (Auto)  87.8 % (50.0-75.0)  H  07/30/17  07:00    


 


Lymph % (Auto)  10.8 % (20.0-40.0)  L  07/30/17  07:00    


 


Mono % (Auto)  1.0 % (0.0-10.0)   07/30/17  07:00    


 


Eos % (Auto)  0.1 % (0.0-4.0)   07/30/17  07:00    


 


Baso % (Auto)  0.3 % (0.0-2.0)   07/30/17  07:00    


 


Neut #  6.7 K/uL (1.8-7.0)   07/30/17  07:00    


 


Lymph #  0.8 K/uL (1.0-4.3)  L  07/30/17  07:00    


 


Mono #  0.1 K/uL (0.0-0.8)   07/30/17  07:00    


 


Eos #  0.0 K/uL (0.0-0.7)   07/30/17  07:00    


 


Baso #  0.0 K/uL (0.0-0.2)   07/30/17  07:00    


 


pO2  40 mm/Hg (30-55)   07/29/17  20:39    


 


VBG pH  7.44  (7.32-7.43)  H  07/29/17  20:39    


 


VBG pCO2  42 mmHg (40-60)   07/29/17  20:39    


 


VBG HCO3  27.2 mmol/L  07/29/17  20:39    


 


VBG Total CO2  29.8 mmol/L (22-28)  H  07/29/17  20:39    


 


VBG O2 Sat (Calc)  78.7 % (40-65)  H  07/29/17  20:39    


 


VBG Base Excess  3.8 mmol/L (0.0-2.0)  H  07/29/17  20:39    


 


VBG Potassium  3.6 mmol/L (3.6-5.2)   07/29/17  20:39    


 


Sodium  137.0 mmol/L (132-148)   07/29/17  20:39    


 


Chloride  103.0 mmol/L ()   07/29/17  20:39    


 


Glucose  167 mg/dL ()  H  07/29/17  20:39    


 


Lactate  1.5 mmol/L (0.7-2.1)   07/29/17  20:39    


 


FiO2  21.0 %  07/29/17  20:39    


 


Sodium  136 mmol/l (132-148)   07/30/17  07:00    


 


Potassium  4.8 MMOL/L (3.6-5.0)   07/30/17  07:00    


 


Chloride  99 mmol/L ()   07/30/17  07:00    


 


Carbon Dioxide  26 mmol/L (22-30)   07/30/17  07:00    


 


Anion Gap  15  (10-20)   07/30/17  07:00    


 


BUN  24 mg/dl (7-17)  H  07/30/17  07:00    


 


Creatinine  0.7 mg/dL (0.7-1.2)   07/30/17  07:00    


 


Est GFR ( Amer)  > 60   07/30/17  07:00    


 


Est GFR (Non-Af Amer)  > 60   07/30/17  07:00    


 


POC Glucose (mg/dL)  329 mg/dL ()  H  08/01/17  11:38    


 


Random Glucose  383 mg/dL ()  H  07/30/17  07:00    


 


Hemoglobin A1c  7.2 % (4.2-6.5)  H  07/30/17  07:00    


 


Calcium  9.8 mg/dL (8.4-10.2)   07/30/17  07:00    


 


Total Bilirubin  0.8 mg/dl (0.2-1.3)   07/30/17  07:00    


 


AST  32 U/L (14-36)   07/30/17  07:00    


 


ALT  42 U/L (9-52)   07/30/17  07:00    


 


Alkaline Phosphatase  93 U/L ()   07/30/17  07:00    


 


Troponin I  < 0.0120 ng/mL (0.00-0.120)   07/29/17  20:20    


 


Total Protein  7.3 G/DL (6.3-8.2)   07/30/17  07:00    


 


Albumin  4.2 g/dL (3.5-5.0)   07/30/17  07:00    


 


Globulin  3.2 gm/dL (2.2-3.9)   07/30/17  07:00    


 


Albumin/Globulin Ratio  1.3  (1.0-2.1)   07/30/17  07:00    


 


Triglycerides  67 mg/DL (0-149)   07/30/17  07:00    


 


Cholesterol  279 mg/dL (0-199)  H  07/30/17  07:00    


 


LDL Cholesterol Direct  211 mg/dL (0-129)  H  07/30/17  07:00    


 


HDL Cholesterol  47 MG/DL (30-70)   07/30/17  07:00    


 


Venous Blood Potassium  3.6 mmol/L (3.6-5.2)   07/29/17  20:39    














- Hospital Course


Hospital Course: 


Admission date:07/29/17


Discharge Date: 08/01/17


Discharge Diagnosis: Acute Asthma Exacerbation


Consultations: None


Procedures: None


Complications: None





History and Hospital Course:60 yo old F with PMH asthma, HTN, DM2, HLD, bipolar 

disorder admitted SOB and wheezing and was treated for asthma exacerbation. Pt 

symptoms have resolved and she is no longer in respiratory distress, although 

she still has residual chest tightness. Chest Xray done on admission was clear. 

On the day of discharge,RRT was called because she was complaining of chest 

tightness and chest pain associated with a severe amount of anxiety. Pt was 

noted to be emotional and crying during the RRT. Her vitals were stable and EKG 

showed normal sinus rythm.  Pt was observed over the next 2 hours and was found 

to be eating and talking on the phone. When asked what happened, she stated 

that she begin thinking about her health and started to feel very anxious, at 

which point her chest began to feel tight and painful just as it does when she 

has her anxiety attacks. During her hospital course, pt's POC glucose was often 

in the 300-400 range. 











Medication at Discharge:


   Dulera 200mcg/5mcg BID


   Albuterol (Ventolin) 90mcg


   Monteleukast 10mg PO QD


   Omeprazole 20mg BID


   HCTZ 25 mg PO QD


   Lisinopril 40mg PO QD


   Metformin 500mg PO BID


   Prednisone 40 qday





Discharge Plan:


   Condition Upon Discharge: Stable


   Activity Level: Ambulating without assistant


   Diet: Regular


   Date of next Appt: 1-2 weeks, will [facility] appt [facility]


   Issues to be addressed at follow up: Anxiety management, Asthma equipment 

needed, Varicose Vein management (compression stockings)


   PCP Following:Dr. Malloy





























Discharge Exam





- Head Exam


Head Exam: NORMAL INSPECTION





- Respiratory Exam


Respiratory Exam: NORMAL BREATHING PATTERN.  absent: Accessory Muscle Use, 

Chest Wall Tenderness, Decreased Breath Sounds, Rales, Wheezes, Respiratory 

Distress





- Cardiovascular Exam


Cardiovascular Exam: REGULAR RHYTHM, +S1, +S2.  absent: Systolic Murmur





- Neurological Exam


Neurological exam: Alert, Oriented x3





Discharge Plan





- Discharge Medications


Prescriptions: 


Albuterol HFA [Ventolin HFA 90 mcg/actuation (8 g)] 90 mcg PO PRN PRN 30 Days


 PRN Reason: Shortness Of Breath


hydroCHLOROthiazide [Hydrodiuril] 25 mg PO DAILY #30 


Lisinopril [Zestril] 40 mg PO DAILY #30 


metFORMIN [glucOPHAGE] 500 mg PO BID #30 


Mometasone/Formoterol [Dulera 200 Mcg/5 Mcg Inhaler] 13 gm IH Q12 #1 hfa.aer.ad


Montelukast [Singulair] 10 mg PO HS #30 


predniSONE [predniSONE Tab] 20 mg PO DAILY #7 tab





- Follow Up Plan


Condition: STABLE


Disposition: HOME/ ROUTINE


Instructions:  Asthma (DC)

## 2017-08-02 NOTE — CARD
--------------- APPROVED REPORT --------------





EKG Measurement

Heart [id number]

AK [id number]

[id number]

[id number]

[id number]



<Conclusion>

Normal sinus rhythm

Normal ECG

## 2017-11-21 ENCOUNTER — HOSPITAL ENCOUNTER (INPATIENT)
Dept: HOSPITAL 14 - H.ER | Age: 62
LOS: 3 days | Discharge: HOME | DRG: 203 | End: 2017-11-24
Attending: FAMILY MEDICINE | Admitting: FAMILY MEDICINE
Payer: MEDICARE

## 2017-11-21 VITALS — BODY MASS INDEX: 31.6 KG/M2

## 2017-11-21 DIAGNOSIS — K21.9: ICD-10-CM

## 2017-11-21 DIAGNOSIS — F41.9: ICD-10-CM

## 2017-11-21 DIAGNOSIS — I10: ICD-10-CM

## 2017-11-21 DIAGNOSIS — E78.00: ICD-10-CM

## 2017-11-21 DIAGNOSIS — Z91.041: ICD-10-CM

## 2017-11-21 DIAGNOSIS — T38.0X5A: ICD-10-CM

## 2017-11-21 DIAGNOSIS — J45.901: Primary | ICD-10-CM

## 2017-11-21 DIAGNOSIS — Z91.14: ICD-10-CM

## 2017-11-21 DIAGNOSIS — E11.65: ICD-10-CM

## 2017-11-21 DIAGNOSIS — E78.5: ICD-10-CM

## 2017-11-21 DIAGNOSIS — F31.9: ICD-10-CM

## 2017-11-21 DIAGNOSIS — K29.70: ICD-10-CM

## 2017-11-21 DIAGNOSIS — Z23: ICD-10-CM

## 2017-11-21 LAB
ALBUMIN/GLOB SERPL: 1.3 {RATIO} (ref 1–2.1)
ALP SERPL-CCNC: 75 U/L (ref 38–126)
ALT SERPL-CCNC: 33 U/L (ref 9–52)
APTT BLD: 30.6 SECONDS (ref 25.6–37.1)
AST SERPL-CCNC: 29 U/L (ref 14–36)
BASOPHILS # BLD AUTO: 0.1 K/UL (ref 0–0.2)
BASOPHILS NFR BLD: 0.8 % (ref 0–2)
BILIRUB SERPL-MCNC: 0.6 MG/DL (ref 0.2–1.3)
BUN SERPL-MCNC: 20 MG/DL (ref 7–17)
CALCIUM SERPL-MCNC: 9.1 MG/DL (ref 8.4–10.2)
CHLORIDE SERPL-SCNC: 105 MMOL/L (ref 98–107)
CO2 SERPL-SCNC: 27 MMOL/L (ref 22–30)
EOSINOPHIL # BLD AUTO: 0.6 K/UL (ref 0–0.7)
EOSINOPHIL NFR BLD: 6.6 % (ref 0–4)
ERYTHROCYTE [DISTWIDTH] IN BLOOD BY AUTOMATED COUNT: 13 % (ref 11.5–14.5)
GLOBULIN SER-MCNC: 3.2 GM/DL (ref 2.2–3.9)
GLUCOSE SERPL-MCNC: 150 MG/DL (ref 65–105)
HCT VFR BLD CALC: 41.5 % (ref 34–47)
LYMPHOCYTES # BLD AUTO: 3.2 K/UL (ref 1–4.3)
LYMPHOCYTES NFR BLD AUTO: 36.5 % (ref 20–40)
MAGNESIUM SERPL-MCNC: 2.1 MG/DL (ref 1.6–2.3)
MCH RBC QN AUTO: 29.9 PG (ref 27–31)
MCHC RBC AUTO-ENTMCNC: 33.4 G/DL (ref 33–37)
MCV RBC AUTO: 89.6 FL (ref 81–99)
MONOCYTES # BLD: 0.7 K/UL (ref 0–0.8)
MONOCYTES NFR BLD: 7.6 % (ref 0–10)
NEUTROPHILS # BLD: 4.3 K/UL (ref 1.8–7)
NEUTROPHILS NFR BLD AUTO: 48.5 % (ref 50–75)
NRBC BLD AUTO-RTO: 0.1 % (ref 0–0)
PHOSPHATE SERPL-MCNC: 3.5 MG/DL (ref 2.5–4.5)
PLATELET # BLD: 319 K/UL (ref 130–400)
PMV BLD AUTO: 8.2 FL (ref 7.2–11.7)
POTASSIUM SERPL-SCNC: 4.8 MMOL/L (ref 3.6–5)
PROT SERPL-MCNC: 7.4 G/DL (ref 6.3–8.2)
SODIUM SERPL-SCNC: 140 MMOL/L (ref 132–148)
WBC # BLD AUTO: 8.8 K/UL (ref 4.8–10.8)

## 2017-11-21 NOTE — CP.PCM.HP
History of Present Illness





- History of Present Illness


History of Present Illness: 


62 y/o female with a PMHx of mild persistent asthma, HTN, HLD, GERD, NIDDM2, 

and Bipolar disorder presents to [facility] ED with a CC of SOB. Pt reports SOB began 

approx 4 days ago w/o any triggering event/exposure. She reports she was using 

her medications as prescribed and they were some what alleviating the SOB, 

hence the delay in presentation. Today she realized the SOB was getting worse, 

and she had an associated chest tightness and minimally productive cough. She 

reports she felt as if "she couldnt get enough air resting or walk to her 

bathroom" so she came in for evaluation. She denies any recent URIs, sick 

contacts, exposure to allergens, medical noncompliance. She denies any fever/

chills, rhinorhea, sore throat, lightheadedness, CP/Palpitations, N/V/D/C, 

numbness/tingling, calf pain. 





ROS: as per HPI, all other systems reviewed found to be negative





PMD: Dr. Abe Harper


PMHx: Mild Persistent Asthma, NIDDM2, HTN, HLD, Bipolar Disorder


Medications: Symbicort 2puff BID, Ventolin 90mcg , Montelukast 10mg PO QD, 

Omeprazole 20mg PO BID, HCTZ 25mg PO QD, Lisinopril 40mg PO QD, Metformin 500mg 

PO BID


ALL: iodine, season allergies, dust/mold 


PSurgHx: Hysterectomy, LUQ Lipoma excision, Oophorectomy, Cardiac Cath


PHospHx: reports being admitted 2 times this year (2017) for asthma 

exacerbations, reports tx with magnesium and steroids, denies intubation/ICU. 


SocialHx: denies tobacco abuse, ETOH/drugs; lives alone, daughter lives in 

[place], son lives in [place]


      Contact: son: KUMAR Mobley [phone]


FamilyHx: Mother passed away at 84-cardiac arrest/DM Type 2; Father passed away 

at 70-liver failure/hepatitis, siblings with HTN and asthma





ED Course: 


Vitals on Presentation:


T 97.9, HR 81, RR 22, /83, POX 90% RA


Labs


CBC: 8.8>13.9/41.5<319


CMP: K: 4.8, Ma.1, BUN/Cr: 20/0.6


Troponin I: <0.0120


NT-PBNP: 111


Coags: 11.4/1.0/30.6


Influenza A/B: negative


ABG not performed as pt DECLINED


Imaging


CXR


EKG: ~75bpm, NSR, no axis deviation, normal NE/QTC intervals, inverted T-waves 

in V1 (compared to 17 and 2017 ekg, no change)


Treatment:


9ml Duo-Neb INH


Methylprednisolone 125mg IV


Magnesium 2gm IV 





Present on Admission





- Present on Admission


Any Indicators Present on Admission: No





Past Patient History





- Past Medical History & Family History


Past Medical History?: Yes





- Past Social History


Alcohol: None


Drugs: Denies





- CARDIAC


Hx Hypercholesterolemia: Yes


Hx Hypertension: Yes





- PULMONARY


Hx Asthma: Yes





- NEUROLOGICAL


Hx Neurological Disorder: No





- HEENT


Hx HEENT Problems: No





- RENAL


Hx Chronic Kidney Disease: No





- ENDOCRINE/METABOLIC


Hx Endocrine Disorders: Yes


Hx Diabetes Mellitus Type 2: Yes





- HEMATOLOGICAL/ONCOLOGICAL


Hx Blood Disorders: No





- INTEGUMENTARY


Hx Dermatological Problems: No





- MUSCULOSKELETAL/RHEUMATOLOGICAL


Hx Musculoskeletal Disorders: No


Hx Falls: No





- GASTROINTESTINAL


Hx Diverticulitis: Yes


Hx Gastritis: Yes





- GENITOURINARY/GYNECOLOGICAL


Hx Genitourinary Disorders: No





- PSYCHIATRIC


Hx Anxiety: Yes


Hx Bipolar Disorder: Yes


Hx Depression: Yes





- SURGICAL HISTORY


Hx Surgeries: Yes


Hx Cardiac Catheterization: Yes


Hx Hysterectomy: Yes


Other/Comment: left lypoma. oophorectomy





- ANESTHESIA


Hx Anesthesia: Yes


Hx Anesthesia Reactions: No


Hx Malignant Hyperthermia: No





Meds


Allergies/Adverse Reactions: 


 Allergies











Allergy/AdvReac Type Severity Reaction Status Date / Time


 


iodine Allergy  RASH Verified 17 20:31














Physical Exam





- Constitutional


Appears: Non-toxic, No Acute Distress


Additional comments: 





pt examined in ED room 24, sitting straight up in bed. Pt is able to talk in 

complete sentences. 





- Head Exam


Head Exam: ATRAUMATIC, NORMOCEPHALIC





- Eye Exam


Eye Exam: EOMI.  absent: Conjunctival injection, Scleral icterus


Pupil Exam: PERRL





- ENT Exam


ENT Exam: Mucous Membranes Moist





- Neck Exam


Neck exam: Positive for: Full Rom.  Negative for: Lymphadenopathy





- Respiratory Exam


Respiratory Exam: Decreased Breath Sounds (bibasilar decreased breath sounds ), 

Prolonged Expiratory Phase, Wheezes (inspiratory and expiratory wheezing in 

appreciated throughout both lung fields, superior to inferior).  absent: 

Accessory Muscle Use, Clear to Auscultation Bilateral, Rales, Respiratory 

Distress, Stridor





- Cardiovascular Exam


Cardiovascular Exam: REGULAR RHYTHM, RRR, +S1, +S2.  absent: Tachycardia, Gallop

, JVD, Rubs, Systolic Murmur





- GI/Abdominal Exam


GI & Abdominal Exam: Normal Bowel Sounds, Soft.  absent: Distended, Firm, 

Guarding, Rebound, Rigid, Tenderness


Additional comments: 





no abdominal paradox appreciated





- Extremities Exam


Extremities exam: Positive for: normal capillary refill, normal inspection, 

pedal pulses present.  Negative for: calf tenderness, pedal edema, tenderness





- Back Exam


Back exam: NORMAL INSPECTION.  absent: CVA tenderness (L), CVA tenderness (R)





- Neurological Exam


Neurological exam: Alert, CN II-XII Intact, Oriented x3, Reflexes Normal





- Psychiatric Exam


Psychiatric exam: Normal Affect, Normal Mood





- Skin


Skin Exam: Dry, Intact, Normal Color, Warm





Results





- Vital Signs


Recent Vital Signs: 





 Last Vital Signs











Temp  98.2 F   17 20:40


 


Pulse  81   17 20:40


 


Resp  19   17 20:40


 


BP  158/80 H  17 20:40


 


Pulse Ox  94 L  17 21:27














- Labs


Result Diagrams: 


 17 18:59





 17 18:59


Labs: 





 Laboratory Results - last 24 hr











  17





  18:59 18:59 18:59


 


WBC   8.8 


 


RBC   4.63 


 


Hgb   13.9 


 


Hct   41.5 


 


MCV   89.6 


 


MCH   29.9 


 


MCHC   33.4 


 


RDW   13.0 


 


Plt Count   319 


 


MPV   8.2 


 


Neut % (Auto)   48.5 L 


 


Lymph % (Auto)   36.5 


 


Mono % (Auto)   7.6 


 


Eos % (Auto)   6.6 H 


 


Baso % (Auto)   0.8 


 


Neut #   4.3 


 


Lymph #   3.2 


 


Mono #   0.7 


 


Eos #   0.6 


 


Baso #   0.1 


 


PT   


 


INR   


 


APTT   


 


Sodium  140  


 


Potassium  4.8  


 


Chloride  105  


 


Carbon Dioxide  27  


 


Anion Gap  13  


 


BUN  20 H  


 


Creatinine  0.6 L  


 


Est GFR ( Amer)  > 60  


 


Est GFR (Non-Af Amer)  > 60  


 


Random Glucose  150 H  


 


Calcium  9.1  


 


Phosphorus  3.5  


 


Magnesium  2.1  


 


Total Bilirubin  0.6  


 


AST  29  


 


ALT  33  


 


Alkaline Phosphatase  75  


 


Troponin I  < 0.0120  


 


NT-Pro-B Natriuret Pep  111  


 


Total Protein  7.4  


 


Albumin  4.2  


 


Globulin  3.2  


 


Albumin/Globulin Ratio  1.3  


 


Influenza Typ A,B (EIA)    Negative for flu a/b














  17





  18:59


 


WBC 


 


RBC 


 


Hgb 


 


Hct 


 


MCV 


 


MCH 


 


MCHC 


 


RDW 


 


Plt Count 


 


MPV 


 


Neut % (Auto) 


 


Lymph % (Auto) 


 


Mono % (Auto) 


 


Eos % (Auto) 


 


Baso % (Auto) 


 


Neut # 


 


Lymph # 


 


Mono # 


 


Eos # 


 


Baso # 


 


PT  11.4


 


INR  1.0


 


APTT  30.6


 


Sodium 


 


Potassium 


 


Chloride 


 


Carbon Dioxide 


 


Anion Gap 


 


BUN 


 


Creatinine 


 


Est GFR ( Amer) 


 


Est GFR (Non-Af Amer) 


 


Random Glucose 


 


Calcium 


 


Phosphorus 


 


Magnesium 


 


Total Bilirubin 


 


AST 


 


ALT 


 


Alkaline Phosphatase 


 


Troponin I 


 


NT-Pro-B Natriuret Pep 


 


Total Protein 


 


Albumin 


 


Globulin 


 


Albumin/Globulin Ratio 


 


Influenza Typ A,B (EIA) 














Assessment & Plan





- Assessment and Plan (Free Text)


Assessment: 


Assessment:





62 y/o female with PMHx of Mild Persistent Asthma, NIDDM2, HTN, HLD, and 

Bipolar Disorder admitted for acute Asthma exacerbation.





Plan:





1) Acute Asthma Exacerbation


-Admitted to tele for further management/monitoring


-s/p IV 2gm Mag in ED


-f/u Mag Level


-continuous O2 via NC @ 2L to maintain POX >90%


-DuoNeb 3mL INH Q4 JUDY


-DuoNeb 3mL INH Q4H PRN


-Methylprednisone 60mg IV Q12H


-Montelukast 10mg PO QD


-monitor vitals


-heart healthly/low sodium/low carb diet 





2) NIDDM2


-controlled


-c/w Metformin 500mg PO BID


-accucheck ACHS





3) Essential Hypertension


-controlled


-c/w Lisinopril 40mg PO QD, HCTZ 25 mg PO QD, Aspirin 81mg PO QD





4) GERD


-controlled


-c/w with Prilosec OTC 40mg QD





5) Hyperlipidemia


-controlled


-c/w Atorvastatin 40mg PO HS





6) Bipolar disorder


-stable, chronic


-not currently on medications





7) DVT prophylaxis


-CrCl: 127 mL/hr


-Lovenox 40mg SC QD

## 2017-11-21 NOTE — ED PDOC
HPI: SOB/CHF/COPD


Time Seen by Provider: 11/21/17 18:06


Chief Complaint (Nursing): Respiratory Distress


Chief Complaint (Provider): Shotness of Breath


History Per: Patient


History/Exam Limitations: no limitations


Onset/Duration Of Symptoms: Days (x 4)


Current Symptoms Are (Timing): Still Present


Additional Complaint(s): 


61 year old female with a past medical history of hypertension, diabetes, and 

hypercholesterolemia who presents to the emergency department complaining 

shortness of breath and asthma for 4 days. Associated with cough and green 

sputum.  Patient reports shortness of breath worsened despite taking 

medications at home, as well as chills and chest tightness. Denies fever, nasal 

discharge, sore throat, chest pain and leg swelling. 





PMD: Dr Abe Harper MD











Past Medical History


Reviewed: Historical Data, Nursing Documentation, Vital Signs


Vital Signs: 


 Last Vital Signs











Temp  98.2 F   11/21/17 20:40


 


Pulse  79   11/21/17 23:05


 


Resp  18   11/21/17 23:05


 


BP  123/67   11/21/17 23:05


 


Pulse Ox  95   11/21/17 23:05














- Medical History


PMH: Anxiety, Asthma, Bipolar Disorder, Depression, Diabetes, Diverticulitis, 

Gastritis, HTN, Hypercholesterolemia


   Denies: Chronic Kidney Disease





- Surgical History


Other surgeries: Cardiac Catheterization





- Family History


Family History: States: Diabetes, Hypertension





- Social History


Current smoker - smoking cessation education provided: No


Alcohol: None


Drugs: Denies





- Home Medications


Home Medications: 


 Ambulatory Orders











 Medication  Instructions  Recorded


 


Aspirin 81 mg PO HS 04/27/15


 


Budesonide/Formoterol Fumarate 2 puff IH BID 04/27/15





[Symbicort]  


 


Calcium/Vitamin D [Calcium + D 600 1 tab PO BID 04/27/15





mg-200 Iu]  


 


Montelukast [Singulair] 10 mg PO HS 04/27/15


 


Omeprazole [Prilosec] 40 mg PO DAILY 04/27/15


 


Atorvastatin Calcium [Lipitor] 40 mg PO DAILY #0 tab 04/28/15


 


Albuterol HFA [Ventolin HFA 90 90 mcg PO PRN PRN 30 Days 08/01/17





mcg/actuation (8 g)]  


 


Lisinopril [Zestril] 40 mg PO DAILY #30 08/01/17


 


Mometasone/Formoterol [Dulera 200 13 gm IH Q12 #1 hfa.aer.ad 08/01/17





Mcg/5 Mcg Inhaler]  


 


hydroCHLOROthiazide [Hydrodiuril] 25 mg PO DAILY #30 08/01/17


 


metFORMIN [glucOPHAGE] 500 mg PO BID #30 08/01/17














- Allergies


Allergies/Adverse Reactions: 


 Allergies











Allergy/AdvReac Type Severity Reaction Status Date / Time


 


iodine Allergy  RASH Verified 11/21/17 20:31














Review of Systems


ROS Statement: Except As Marked, All Systems Reviewed And Found Negative (as 

per HPI otherwise negative)


Constitutional: Positive for: Chills.  Negative for: Fever


ENT: Negative for: Nose Discharge, Throat Pain (Sore throat)


Cardiovascular: Positive for: Other (Chest tightness).  Negative for: Chest Pain


Respiratory: Positive for: Cough, Shortness of Breath, Sputum (Green)


Musculoskeletal: Negative for: Leg Pain (Swelling)





Physical Exam





- Reviewed


Nursing Documentation Reviewed: Yes


Vital Signs Reviewed: Yes





- Physical Exam


Appears: Positive for: Non-toxic, In Acute Distress (moderate respiratory)


Head Exam: Positive for: ATRAUMATIC, NORMOCEPHALIC


Skin: Positive for: Warm, Dry


Eye Exam: Positive for: EOMI, PERRL


ENT: Positive for: Pharyngeal Erythema.  Negative for: Tonsillar Exudate, 

Tonsillar Swelling


Neck: Positive for: Limited ROM (tenderness LEFT posterior cervical area, 

reported as ongoing for months and has had lymphadenopathy at this area), 

Trachea Midline


Cardiovascular/Chest: Positive for: Regular Rate, Rhythm, Chest Non Tender.  

Negative for: Murmur


Respiratory: Positive for: Wheezing, Respiratory Distress.  Negative for: Rales

, Rhonchi


Gastrointestinal/Abdominal: Positive for: Soft.  Negative for: Tenderness


Back: Positive for: Normal Inspection.  Negative for: Decreased ROM


Extremity: Positive for: Normal ROM.  Negative for: Calf Tenderness


Lymphatic: Negative for: Adenopathy


Neurologic/Psych: Positive for: Alert, Mood/Affect (anxious affect).  Negative 

for: Motor/Sensory Deficits





- Laboratory Results


Result Diagrams: 


 11/21/17 18:59





 11/21/17 18:59





- ECG


O2 Sat by Pulse Oximetry: 94 (RA)


Pulse Ox Interpretation: Normal





- Progress


Re-evaluation Time: 20:00


Condition: Unchanged (IV Magnesium ordered)





- Critical Care


Total Time (In Min): 30


Documented Critical Care: Time excludes all time spent performint seperately 

billable procedures





Medical Decision Making


Medical Decision Making: 





Time: 1800


Initial Impression: Asthma exacerbation, differential diagnoses also include 

but are not limited to pneumonia, pleural effusion, status asthmaticus, 

congestive heart failure, and  Acute Coronary Syndrome


Initial plan:





Time: 18:33


- Chest X-Ray


-EKG 


-Duoneb 9 ml INH


-methylprednisolone 125 mg IVP


-cardiac Monitoring 


-accucheck 





Time: 1859


-Labs 


-Partial thromboplastin 


-Prothrombin


-Blood culture 


-Influenza A B


 


Influenza A B


-Negative


 


Time: 2025


Admit to hospital under observation in telemetry for status asthmaticus under 

the care of Dr. Estella Sam


 








Scribe Attestation:   


Documented by Leena Gerardo, acting as a scribe for Bhavya Soria MD. 





Provider Scribe Attestation:


All medical record entries made by the Scribe were at my direction and 

personally dictated by me. I have reviewed the chart and agree that the record 

accurately reflects my personal performance of the history, physical exam, 

medical decision making, and the department course for this patient. I have 

also personally directed, reviewed, and agree with the discharge instructions 

and disposition.











Disposition





- Clinical Impression


Clinical Impression: 


 Acute respiratory distress, Asthma with severe exacerbation








- Patient ED Disposition


Is Patient to be Admitted: Yes


Discussed With : Loc Velázquez


Comment: FP resident


Doctor Will See Patient In The: ED


Counseled Patient/Family Regarding: Studies Performed, Diagnosis





- Disposition


Disposition: Transfer of Care (to observation in telemetry under the care of 

Dr. Estella Sam)


Disposition Time: 20:25


Condition: SERIOUS





- Pt Status Changed To:


Hospital Disposition Of: Observation





- POA


Present On Arrival: None

## 2017-11-22 LAB
ALBUMIN/GLOB SERPL: 1.4 {RATIO} (ref 1–2.1)
ALP SERPL-CCNC: 81 U/L (ref 38–126)
ALT SERPL-CCNC: 29 U/L (ref 9–52)
AST SERPL-CCNC: 23 U/L (ref 14–36)
BILIRUB SERPL-MCNC: 0.3 MG/DL (ref 0.2–1.3)
BUN SERPL-MCNC: 18 MG/DL (ref 7–17)
CALCIUM SERPL-MCNC: 8.9 MG/DL (ref 8.4–10.2)
CHLORIDE SERPL-SCNC: 106 MMOL/L (ref 98–107)
CO2 SERPL-SCNC: 20 MMOL/L (ref 22–30)
GLOBULIN SER-MCNC: 3 GM/DL (ref 2.2–3.9)
GLUCOSE SERPL-MCNC: 350 MG/DL (ref 65–105)
POTASSIUM SERPL-SCNC: 4.3 MMOL/L (ref 3.6–5)
PROT SERPL-MCNC: 7.1 G/DL (ref 6.3–8.2)
SODIUM SERPL-SCNC: 139 MMOL/L (ref 132–148)

## 2017-11-22 PROCEDURE — 3E0234Z INTRODUCTION OF SERUM, TOXOID AND VACCINE INTO MUSCLE, PERCUTANEOUS APPROACH: ICD-10-PCS | Performed by: FAMILY MEDICINE

## 2017-11-22 RX ADMIN — CALCIUM CARBONATE-VITAMIN D TAB 500 MG-200 UNIT SCH TAB: 500-200 TAB at 08:51

## 2017-11-22 RX ADMIN — PANTOPRAZOLE SODIUM SCH MG: 40 TABLET, DELAYED RELEASE ORAL at 08:51

## 2017-11-22 RX ADMIN — IPRATROPIUM BROMIDE AND ALBUTEROL SULFATE SCH ML: .5; 3 SOLUTION RESPIRATORY (INHALATION) at 19:57

## 2017-11-22 RX ADMIN — CALCIUM CARBONATE-VITAMIN D TAB 500 MG-200 UNIT SCH TAB: 500-200 TAB at 16:31

## 2017-11-22 RX ADMIN — ENOXAPARIN SODIUM SCH MG: 40 INJECTION SUBCUTANEOUS at 08:51

## 2017-11-22 RX ADMIN — IPRATROPIUM BROMIDE AND ALBUTEROL SULFATE SCH ML: .5; 3 SOLUTION RESPIRATORY (INHALATION) at 16:04

## 2017-11-22 RX ADMIN — INSULIN DETEMIR SCH UNITS: 100 INJECTION, SOLUTION SUBCUTANEOUS at 23:56

## 2017-11-22 RX ADMIN — IPRATROPIUM BROMIDE AND ALBUTEROL SULFATE SCH ML: .5; 3 SOLUTION RESPIRATORY (INHALATION) at 11:28

## 2017-11-22 RX ADMIN — FLUTICASONE PROPIONATE AND SALMETEROL SCH PUFF: 50; 500 POWDER RESPIRATORY (INHALATION) at 21:29

## 2017-11-22 RX ADMIN — IPRATROPIUM BROMIDE AND ALBUTEROL SULFATE SCH ML: .5; 3 SOLUTION RESPIRATORY (INHALATION) at 08:23

## 2017-11-22 NOTE — CARD
--------------- APPROVED REPORT --------------





EKG Measurement

Heart [id number]

CT 120P50

[id number]

[id number]

[id number]



<Conclusion>

Normal sinus rhythm

Normal ECG

## 2017-11-22 NOTE — RAD
HISTORY:

sob  



COMPARISON:

12/13/2016



TECHNIQUE:

Chest PA and lateral



FINDINGS:



LUNGS:

No active pulmonary disease. A 2 mm nodule right upper lung zone may 

represent the CT referenced tiny granuloma the CTs from 3/3/2017 



PLEURA:

No significant pleural effusion identified. No pneumothorax apparent.



CARDIOVASCULAR:

Normal. Tortuous thoracic aorta- no change in caliber caliber still 

within normal limits. 



OSSEOUS STRUCTURES:

No significant abnormalities.



VISUALIZED UPPER ABDOMEN:

Normal.



OTHER FINDINGS:

None.



IMPRESSION:

No significant pulmonary venous congestion. No consolidative 

infiltrate or atelectasis.  No pneumothorax

## 2017-11-22 NOTE — CP.PCM.PN
Subjective





- Date & Time of Evaluation


Date of Evaluation: 11/22/17


Time of Evaluation: 07:35





- Subjective


Subjective: 





60 y/o F admitted for asthma exacerbation seen at bedside in not acute 

distress. Patient speaking in full sentences, no evident resp distress and 

tolerates flat decubitus. Afebrile. Denies CP, palpitations, vomiting, nausea, 

changes in urination or stools. Patient states she has been feeling tired and 

with worsening exercise tolerance for the past 2 years. She admits daytime 

somnolence occasionally and snoring. 





Objective





- Vital Signs/Intake and Output


Vital Signs (last 24 hours): 


 











Temp Pulse Resp BP Pulse Ox


 


 97.6 F   82   18   128/71   96 


 


 11/22/17 04:47  11/22/17 04:47  11/22/17 04:47  11/22/17 04:47  11/22/17 04:47











- Medications


Medications: 


 Current Medications





Albuterol/Ipratropium (Duoneb 3 Mg/0.5 Mg (3 Ml) Ud)  3 ml INH RQID [facility]


Albuterol/Ipratropium (Duoneb 3 Mg/0.5 Mg (3 Ml) Ud)  3 ml INH RQ4 PRN


   PRN Reason: Shortness of Breath


   Last Admin: 11/21/17 21:59 Dose:  3 ml


Aspirin (Aspirin Chewable)  81 mg PO HS [facility]


   Last Admin: 11/21/17 22:28 Dose:  81 mg


Atorvastatin Calcium (Lipitor)  40 mg PO HS [facility]


Calcium/Vitamin D (Oyster Shell Calcium/Vitamin D 500 Mg-200 Iu)  1 tab PO BID 

[facility]


Enoxaparin Sodium (Lovenox)  40 mg SC DAILY [facility]


   PRN Reason: Protocol


Hydrochlorothiazide (Hydrodiuril)  25 mg PO DAILY [facility]


Insulin Human Regular (Humulin R)  0 units SC ACCU-CHECK [facility]


   PRN Reason: Protocol


   Last Admin: 11/22/17 06:45 Dose:  6 units


Lisinopril (Zestril)  40 mg PO DAILY [facility]


Metformin HCl (Glucophage)  500 mg PO BID [facility]


Montelukast Sodium (Singulair)  10 mg PO HS [facility]


   Last Admin: 11/21/17 22:29 Dose:  10 mg


Pantoprazole Sodium (Protonix Ec Tab)  40 mg PO DAILY [facility]


Fluticasone/Salmeterol (Advair Diskus 250/50)  1 puff IH Q12 [facility]











- Labs


Labs: 


 





 11/21/17 18:59 





 11/22/17 04:10 





 











PT  11.4 Seconds (9.8-13.1)   11/21/17  18:59    


 


INR  1.0  (0.9-1.2)   11/21/17  18:59    


 


APTT  30.6 Seconds (25.6-37.1)   11/21/17  18:59    














- Constitutional


Appears: Non-toxic





- Head Exam


Head Exam: NORMAL INSPECTION





- Eye Exam


Eye Exam: Normal appearance





- ENT Exam


ENT Exam: Mucous Membranes Moist





- Respiratory Exam


Respiratory Exam: Decreased Breath Sounds, Rhonchi, Wheezes.  absent: Accessory 

Muscle Use, Prolonged Expiratory Phase, Rales





- Cardiovascular Exam


Cardiovascular Exam: REGULAR RHYTHM, +S1, +S2.  absent: Gallop





- GI/Abdominal Exam


GI & Abdominal Exam: Soft.  absent: Guarding, Tenderness, Rebound





- Extremities Exam


Extremities Exam: Normal Capillary Refill.  absent: Calf Tenderness, Pedal Edema





- Neurological Exam


Neurological Exam: Alert, Awake, Oriented x3





- Psychiatric Exam


Psychiatric exam: Anxious





- Skin


Skin Exam: Normal Color, Warm





Assessment and Plan





- Assessment and Plan (Free Text)


Assessment: 





60 y/o female with PMHx of Mild Persistent Asthma, NIDDM2, HTN, HLD, and 

Bipolar Disorder admitted for Asthma exacerbation.





Plan:





 Moderate Asthma with Exacerbation


-Solumedrol 60mg daily IV


-Duoneb sched and PRN for SOB


-O2 PRN for SOB


-C/W Montelukast 10mg PO QD


-Advair diskus restarted higher dose that patient was using at home.





 NIDDM2


-Uncontrolled since admission. Likely due to steroids use


-c/w Metformin 500mg PO BID


-accucheck ACHS


-Start Levemir 10 units SQ HS for now


-Will continue monitoring


-Patient needs steroids for asthma exacerbation





 Fatigue


-Chronic


-R/O Cardiac vs Sleep apnea


-Cardiac stress test with no ischemic changes from 06/25/15


-EKG on admission, unremarkable. 


-TnI normal x1


-Echocardiogram ordered


-Recommend Sleep studies as outpatient.





 Essential Hypertension


-controlled


-c/w current meds





 GERD


-controlled


-c/w with current plan





 Hyperlipidemia


-controlled


-c/w current med





 Bipolar disorder


-stable, chronic, Patient's Hx


-not currently on medications





 DVT prophylaxis


-Lovenox 40mg SC QD

## 2017-11-23 LAB
ALBUMIN/GLOB SERPL: 1.3 {RATIO} (ref 1–2.1)
ALP SERPL-CCNC: 69 U/L (ref 38–126)
ALT SERPL-CCNC: 33 U/L (ref 9–52)
AST SERPL-CCNC: 16 U/L (ref 14–36)
BASOPHILS # BLD AUTO: 0.1 K/UL (ref 0–0.2)
BASOPHILS NFR BLD: 0.5 % (ref 0–2)
BILIRUB SERPL-MCNC: 0.5 MG/DL (ref 0.2–1.3)
BUN SERPL-MCNC: 19 MG/DL (ref 7–17)
CALCIUM SERPL-MCNC: 9.3 MG/DL (ref 8.4–10.2)
CHLORIDE SERPL-SCNC: 104 MMOL/L (ref 98–107)
CO2 SERPL-SCNC: 27 MMOL/L (ref 22–30)
EOSINOPHIL # BLD AUTO: 0 K/UL (ref 0–0.7)
EOSINOPHIL NFR BLD: 0.3 % (ref 0–4)
ERYTHROCYTE [DISTWIDTH] IN BLOOD BY AUTOMATED COUNT: 13 % (ref 11.5–14.5)
GLOBULIN SER-MCNC: 3 GM/DL (ref 2.2–3.9)
GLUCOSE SERPL-MCNC: 207 MG/DL (ref 65–105)
HCT VFR BLD CALC: 41.1 % (ref 34–47)
LYMPHOCYTES # BLD AUTO: 2.8 K/UL (ref 1–4.3)
LYMPHOCYTES NFR BLD AUTO: 25.9 % (ref 20–40)
MCH RBC QN AUTO: 30.1 PG (ref 27–31)
MCHC RBC AUTO-ENTMCNC: 33.3 G/DL (ref 33–37)
MCV RBC AUTO: 90.3 FL (ref 81–99)
MONOCYTES # BLD: 0.8 K/UL (ref 0–0.8)
MONOCYTES NFR BLD: 7 % (ref 0–10)
NEUTROPHILS # BLD: 7.2 K/UL (ref 1.8–7)
NEUTROPHILS NFR BLD AUTO: 66.3 % (ref 50–75)
NRBC BLD AUTO-RTO: 0 % (ref 0–0)
PLATELET # BLD: 306 K/UL (ref 130–400)
PMV BLD AUTO: 8.8 FL (ref 7.2–11.7)
POTASSIUM SERPL-SCNC: 3.9 MMOL/L (ref 3.6–5)
PROT SERPL-MCNC: 6.9 G/DL (ref 6.3–8.2)
SODIUM SERPL-SCNC: 140 MMOL/L (ref 132–148)
WBC # BLD AUTO: 10.8 K/UL (ref 4.8–10.8)

## 2017-11-23 RX ADMIN — CALCIUM CARBONATE-VITAMIN D TAB 500 MG-200 UNIT SCH TAB: 500-200 TAB at 17:32

## 2017-11-23 RX ADMIN — IPRATROPIUM BROMIDE AND ALBUTEROL SULFATE SCH ML: .5; 3 SOLUTION RESPIRATORY (INHALATION) at 20:01

## 2017-11-23 RX ADMIN — FLUTICASONE PROPIONATE AND SALMETEROL SCH PUFF: 50; 500 POWDER RESPIRATORY (INHALATION) at 21:05

## 2017-11-23 RX ADMIN — IPRATROPIUM BROMIDE AND ALBUTEROL SULFATE SCH ML: .5; 3 SOLUTION RESPIRATORY (INHALATION) at 07:22

## 2017-11-23 RX ADMIN — FLUTICASONE PROPIONATE AND SALMETEROL SCH PUFF: 50; 500 POWDER RESPIRATORY (INHALATION) at 08:36

## 2017-11-23 RX ADMIN — PANTOPRAZOLE SODIUM SCH MG: 40 TABLET, DELAYED RELEASE ORAL at 08:37

## 2017-11-23 RX ADMIN — ENOXAPARIN SODIUM SCH MG: 40 INJECTION SUBCUTANEOUS at 08:39

## 2017-11-23 RX ADMIN — INSULIN DETEMIR SCH UNITS: 100 INJECTION, SOLUTION SUBCUTANEOUS at 21:07

## 2017-11-23 RX ADMIN — IPRATROPIUM BROMIDE AND ALBUTEROL SULFATE SCH ML: .5; 3 SOLUTION RESPIRATORY (INHALATION) at 11:17

## 2017-11-23 RX ADMIN — IPRATROPIUM BROMIDE AND ALBUTEROL SULFATE SCH ML: .5; 3 SOLUTION RESPIRATORY (INHALATION) at 16:32

## 2017-11-23 RX ADMIN — CALCIUM CARBONATE-VITAMIN D TAB 500 MG-200 UNIT SCH TAB: 500-200 TAB at 08:38

## 2017-11-23 NOTE — CP.PCM.PN
Subjective





- Date & Time of Evaluation


Date of Evaluation: 11/23/17


Time of Evaluation: 07:55





- Subjective


Subjective: 





60 y/o F seen at bedside. No acute events overnight. Patient still c/o feeling 

tired easily. Echo report pending. She is able to tolerate flat decubitus. 

Denies CP, palpitations, vomiting, nausea, abd pain. O2sat WNL at RA. Patient 

feels "better". 


Patient revaluate 1 hour and 2 hours after initial evaluation. O2sat stable w/o 

O2 and patient in not acute distress. However she has SOB with minimal exertion 

and persistent cough. Complains of  flushing and heat after solumedrol admin. 





Objective





- Vital Signs/Intake and Output


Vital Signs (last 24 hours): 


 











Temp Pulse Resp BP Pulse Ox


 


 97.7 F   65   20   103/68   95 


 


 11/23/17 08:00  11/23/17 09:00  11/23/17 08:00  11/23/17 08:37  11/23/17 08:00











- Medications


Medications: 


 Current Medications





Albuterol/Ipratropium (Duoneb 3 Mg/0.5 Mg (3 Ml) Ud)  3 ml INH RQID [facility]


   Last Admin: 11/23/17 11:17 Dose:  3 ml


Albuterol/Ipratropium (Duoneb 3 Mg/0.5 Mg (3 Ml) Ud)  3 ml INH RQ4 PRN


   PRN Reason: Shortness of Breath


   Last Admin: 11/21/17 21:59 Dose:  3 ml


Aspirin (Aspirin Chewable)  81 mg PO HS [facility]


   Last Admin: 11/22/17 21:29 Dose:  81 mg


Atorvastatin Calcium (Lipitor)  40 mg PO HS [facility]


   Last Admin: 11/22/17 21:29 Dose:  40 mg


Calcium/Vitamin D (Oyster Shell Calcium/Vitamin D 500 Mg-200 Iu)  1 tab PO BID 

[facility]


   Last Admin: 11/23/17 08:38 Dose:  1 tab


Enoxaparin Sodium (Lovenox)  40 mg SC DAILY [facility]


   PRN Reason: Protocol


   Last Admin: 11/23/17 08:39 Dose:  40 mg


Hydrochlorothiazide (Hydrodiuril)  25 mg PO DAILY [facility]


   Last Admin: 11/23/17 08:37 Dose:  25 mg


Insulin Detemir (Levemir)  10 units SC [facility]


   Last Admin: 11/22/17 23:56 Dose:  10 units


Insulin Human Regular (Humulin R)  0 units SC ACCU-CHECK [facility]


   PRN Reason: Protocol


   Last Admin: 11/23/17 06:37 Dose:  Not Given


Lisinopril (Zestril)  40 mg PO DAILY [facility]


   Last Admin: 11/23/17 08:37 Dose:  40 mg


Metformin HCl (Glucophage)  500 mg PO BID [facility]


   Last Admin: 11/23/17 08:37 Dose:  500 mg


Methylprednisolone (Solu-Medrol)  60 mg IVP DAILY [facility]


   Last Admin: 11/23/17 08:40 Dose:  60 mg


Montelukast Sodium (Singulair)  10 mg PO HS [facility]


   Last Admin: 11/22/17 21:29 Dose:  10 mg


Pantoprazole Sodium (Protonix Ec Tab)  40 mg PO DAILY [facility]


   Last Admin: 11/23/17 08:37 Dose:  40 mg


Fluticasone/Salmeterol (Advair Diskus 500/50)  1 puff IH Q12 [facility]


   Last Admin: 11/23/17 08:36 Dose:  1 puff











- Labs


Labs: 


 





 11/23/17 04:25 





 11/23/17 04:25 





 











PT  11.4 Seconds (9.8-13.1)   11/21/17  18:59    


 


INR  1.0  (0.9-1.2)   11/21/17  18:59    


 


APTT  30.6 Seconds (25.6-37.1)   11/21/17  18:59    














- Constitutional


Appears: Non-toxic, In Acute Distress





- Eye Exam


Eye Exam: EOMI, PERRL





- ENT Exam


ENT Exam: Mucous Membranes Moist





- Respiratory Exam


Respiratory Exam: Rales (scattered.), Wheezes (occasional diffuse with deep 

inspiration).  absent: Accessory Muscle Use, Prolonged Expiratory Phase, 

Respiratory Distress





- Cardiovascular Exam


Cardiovascular Exam: REGULAR RHYTHM, +S1, +S2.  absent: Gallop





- GI/Abdominal Exam


GI & Abdominal Exam: Soft, Normal Bowel Sounds.  absent: Distended, Tenderness, 

Rebound





- Extremities Exam


Extremities Exam: Normal Capillary Refill.  absent: Calf Tenderness, Joint 

Swelling, Pedal Edema





- Back Exam


Back Exam: absent: CVA tenderness (L), CVA tenderness (R)





- Neurological Exam


Neurological Exam: Alert, Awake, Oriented x3





- Psychiatric Exam


Psychiatric exam: Normal Affect, Normal Mood





- Skin


Skin Exam: Normal Color, Warm





Assessment and Plan





- Assessment and Plan (Free Text)


Assessment: 





60 y/o female with PMHx of Mild Persistent Asthma, NIDDM2, HTN, HLD,  admitted 

for Asthma exacerbation.








 Moderate Asthma with Exacerbation


-Switch to PO prednisone 60 mg daily starting tomorrow


-stop Solumedrol 60mg daily IV


-Duoneb sched and PRN for SOB


-O2 PRN for SOB


-C/W Montelukast 10mg PO QD


-Admitted not taking Inh steroids at home because unable to afford it. Patient'

s Ph closed today. 


-Anticipated DC tomorrow: WIll izabel Ph for prices on inh steroids tomorrow and 

if unable to afford it will place SW consult. We think patient needs inh 

steroids when she goes home.





 NIDDM2


-Uncontrolled since admission. Likely due to steroids use


-c/w Metformin 500mg PO BID


-accucheck ACHS


-C/W Levemir 10 units SQ HS for now


-Will continue monitoring


-Patient needs steroids for asthma exacerbation





 Fatigue


-Chronic


-R/O Cardiac vs Sleep apnea


-Cardiac stress test with no ischemic changes from 06/25/15


-EKG on admission, unremarkable. 


-TnI normal x1


-Echocardiogram report pending


-Recommend Sleep studies as outpatient.





 Essential Hypertension


-controlled


-c/w current meds





 GERD


-controlled


-c/w with current plan





 Hyperlipidemia


-controlled


-c/w current med





 Bipolar disorder


-stable, chronic, Patient's Hx


-not currently on medications





 DVT prophylaxis


-Lovenox 40mg SC QD

## 2017-11-24 VITALS
SYSTOLIC BLOOD PRESSURE: 139 MMHG | TEMPERATURE: 98.5 F | DIASTOLIC BLOOD PRESSURE: 83 MMHG | HEART RATE: 86 BPM | OXYGEN SATURATION: 93 %

## 2017-11-24 VITALS — RESPIRATION RATE: 20 BRPM

## 2017-11-24 RX ADMIN — IPRATROPIUM BROMIDE AND ALBUTEROL SULFATE SCH ML: .5; 3 SOLUTION RESPIRATORY (INHALATION) at 07:55

## 2017-11-24 RX ADMIN — CALCIUM CARBONATE-VITAMIN D TAB 500 MG-200 UNIT SCH TAB: 500-200 TAB at 08:37

## 2017-11-24 RX ADMIN — IPRATROPIUM BROMIDE AND ALBUTEROL SULFATE SCH ML: .5; 3 SOLUTION RESPIRATORY (INHALATION) at 11:19

## 2017-11-24 RX ADMIN — FLUTICASONE PROPIONATE AND SALMETEROL SCH PUFF: 50; 500 POWDER RESPIRATORY (INHALATION) at 08:35

## 2017-11-24 RX ADMIN — PANTOPRAZOLE SODIUM SCH MG: 40 TABLET, DELAYED RELEASE ORAL at 08:36

## 2017-11-24 RX ADMIN — ENOXAPARIN SODIUM SCH MG: 40 INJECTION SUBCUTANEOUS at 08:36

## 2017-11-24 NOTE — CP.PCM.DIS
Provider





- Provider


Date of Admission: 


11/22/17 11:28





Attending physician: 


Estella Sam MD





Primary care physician: 





Dr Abe Harper


Time Spent in preparation of Discharge (in minutes): 30





Diagnosis





- Discharge Diagnosis


(1) Asthma with severe exacerbation


Status: Acute   


Comment: Exacerbation improved. Patient stable to be DC home and c/w treatment 

  





Hospital Course





- Lab Results


Lab Results: 


 Micro Results





11/21/17 19:10   Blood-Venous   Blood Culture - Preliminary


                            NO GROWTH AFTER 48 HOURS


11/21/17 18:55   Blood-Venous   Blood Culture - Preliminary


                            NO GROWTH AFTER 48 HOURS





 Most Recent Lab Values











WBC  10.8 K/uL (4.8-10.8)   11/23/17  04:25    


 


RBC  4.55 Mil/uL (3.80-5.20)   11/23/17  04:25    


 


Hgb  13.7 g/dL (12.0-16.0)   11/23/17  04:25    


 


Hct  41.1 % (34.0-47.0)   11/23/17  04:25    


 


MCV  90.3 fl (81.0-99.0)   11/23/17  04:25    


 


MCH  30.1 pg (27.0-31.0)   11/23/17  04:25    


 


MCHC  33.3 g/dL (33.0-37.0)   11/23/17  04:25    


 


RDW  13.0 % (11.5-14.5)   11/23/17  04:25    


 


Plt Count  306 K/uL (130-400)   11/23/17  04:25    


 


MPV  8.8 fl (7.2-11.7)   11/23/17  04:25    


 


Neut % (Auto)  66.3 % (50.0-75.0)   11/23/17  04:25    


 


Lymph % (Auto)  25.9 % (20.0-40.0)   11/23/17  04:25    


 


Mono % (Auto)  7.0 % (0.0-10.0)   11/23/17  04:25    


 


Eos % (Auto)  0.3 % (0.0-4.0)   11/23/17  04:25    


 


Baso % (Auto)  0.5 % (0.0-2.0)   11/23/17  04:25    


 


Neut #  7.2 K/uL (1.8-7.0)  H  11/23/17  04:25    


 


Lymph #  2.8 K/uL (1.0-4.3)   11/23/17  04:25    


 


Mono #  0.8 K/uL (0.0-0.8)   11/23/17  04:25    


 


Eos #  0.0 K/uL (0.0-0.7)   11/23/17  04:25    


 


Baso #  0.1 K/uL (0.0-0.2)   11/23/17  04:25    


 


PT  11.4 Seconds (9.8-13.1)   11/21/17  18:59    


 


INR  1.0  (0.9-1.2)   11/21/17  18:59    


 


APTT  30.6 Seconds (25.6-37.1)   11/21/17  18:59    


 


Sodium  140 mmol/l (132-148)   11/23/17  04:25    


 


Potassium  3.9 MMOL/L (3.6-5.0)   11/23/17  04:25    


 


Chloride  104 mmol/L ()   11/23/17  04:25    


 


Carbon Dioxide  27 mmol/L (22-30)   11/23/17  04:25    


 


Anion Gap  13  (10-20)   11/23/17  04:25    


 


BUN  19 mg/dl (7-17)  H  11/23/17  04:25    


 


Creatinine  0.6 mg/dl (0.7-1.2)  L  11/23/17  04:25    


 


Est GFR ( Amer)  > 60   11/23/17  04:25    


 


Est GFR (Non-Af Amer)  > 60   11/23/17  04:25    


 


POC Glucose (mg/dL)  157 mg/dL ()  H  11/24/17  05:16    


 


Random Glucose  207 mg/dL ()  H  11/23/17  04:25    


 


Calcium  9.3 mg/dL (8.4-10.2)   11/23/17  04:25    


 


Phosphorus  3.5 mg/dl (2.5-4.5)   11/21/17  18:59    


 


Magnesium  2.1 MG/DL (1.6-2.3)   11/21/17  18:59    


 


Total Bilirubin  0.5 mg/dl (0.2-1.3)   11/23/17  04:25    


 


AST  16 U/L (14-36)   11/23/17  04:25    


 


ALT  33 U/L (9-52)   11/23/17  04:25    


 


Alkaline Phosphatase  69 U/L ()   11/23/17  04:25    


 


Troponin I  < 0.0120 ng/mL (0.00-0.120)   11/21/17  18:59    


 


NT-Pro-B Natriuret Pep  111 pg/ml (0-900)   11/21/17  18:59    


 


Total Protein  6.9 G/DL (6.3-8.2)   11/23/17  04:25    


 


Albumin  3.9 g/dL (3.5-5.0)   11/23/17  04:25    


 


Globulin  3.0 gm/dL (2.2-3.9)   11/23/17  04:25    


 


Albumin/Globulin Ratio  1.3  (1.0-2.1)   11/23/17  04:25    


 


Influenza Typ A,B (EIA)  Negative for flu a/b  (NEGATIVE)   11/21/17  18:59    














- Hospital Course


Hospital Course: 





62 y/o F with PMHx of asthma presented to ED c/o persistent and severe SOB. 

Patient was admitted to hosp with asthma exacerbation and treatment was 

continued with nebulizers and steroids. Patient improved since admission and 

Today she is in not resp distress, no wheezing on PE, tolerating PO reg diet, 

and feels "much better". Patient admitted not being compliant with meds and we 

discussed with her the importance of taking meds as prescribed as well as the 

need for inh steroid treatment as outpatient for better asthma control. She 

verbalized understanding. Instructed to carry and how to use peak flow meter. 

Echo unremarkable.





Home meds:


Prednisone 60 mg daily for 2 days


Symbicort inh 80/4.5mcg 2 puff BID


Albuterol HFA PRN for SOB


Aspirin 81 mg PO HS   


Calcium/Vitamin D 1 tab PO BID


Montelukast [Singulair]   10 mg PO HS   


Omeprazole [Prilosec]   40 mg PO DAILY   


Atorvastatin Calcium [Lipitor]   40 mg PO DAILY 


Lisinopril [Zestril]   40 mg PO DAILY 


hydroCHLOROthiazide [Hydrodiuril]   25 mg PO DAILY 


metFORMIN [glucOPHAGE]   500 mg PO BID   








Since patient c/o fatigue and admits daytime somnolence we recommend Sleep 

studies as outpatient





Discharge Exam





- Head Exam


Head Exam: NORMAL INSPECTION





- Eye Exam


Eye Exam: EOMI, PERRL





- ENT Exam


ENT Exam: Mucous Membranes Moist





- Respiratory Exam


Respiratory Exam: NORMAL BREATHING PATTERN.  absent: Decreased Breath Sounds, 

Rales, Wheezes, Respiratory Distress





- Cardiovascular Exam


Cardiovascular Exam: REGULAR RHYTHM, +S1, +S2.  absent: Gallop





- GI/Abdominal Exam


GI & Abdominal Exam: Normal Bowel Sounds, Unremarkable





- Extremities Exam


Extremities exam: normal capillary refill, normal inspection





- Neurological Exam


Neurological exam: Alert, Oriented x3





- Psychiatric Exam


Psychiatric exam: Normal Affect, Normal Mood





- Skin


Skin Exam: Normal Color





Discharge Plan





- Discharge Medications


Prescriptions: 


Budesonide/Formoterol Fumarate [Symbicort 80-4.5 Mcg Inhaler] 2 puff IH BID #1 

hfa.aer.ad


predniSONE [predniSONE Tab] 60 mg PO DAILY #6 tab





- Follow Up Plan


Condition: GOOD


Disposition: HOME/ ROUTINE


Patient education suggested?: Yes


Instructions:  How to Use a Metered-Dose Inhaler (DC)


Additional Instructions: 


F/U with Dr Harper on December 13/2017.


[facility] las receta el medico


Regrese a ED si la falta de air empeora, fiebre, vomito


Referrals: 


Abe Harper MD [Family Provider] -

## 2017-11-24 NOTE — CARD
--------------- APPROVED REPORT --------------





EXAM: Two-dimensional and M-mode echocardiogram with Doppler and 

color Doppler.



Other Information 

Quality : GoodRhythm : NSR



INDICATION

Dyspnea 



2D DIMENSIONS 

IVSd0.98   (0.7-1.1cm)LVDd4.25   (3.9-5.9cm)

LVOT Diameter1.95   (1.8-2.4cm)PWd1.10   (0.7-1.1cm)

IVSs1.41   (0.8-1.2cm)LVDs2.65   (2.5-4.0cm)

FS (%) 37.7   %PWs1.43   (0.8-1.2cm)



M-Mode DIMENSIONS 

Left Atrium (MM)4.21   (2.5-4.0cm)IVSd1.26   (0.7-1.1cm)

Aortic Root3.00   (2.2-3.7cm)LVDd4.94   (4.0-5.6cm)

Aortic Cusp Exc.1.94   (1.5-2.0cm)PWd1.24   (0.7-1.1cm)

IVSs1.76   cmFS (%) 58   %

LVDs2.06   (2.0-3.8cm)PWs1.79   cm



Mitral Valve

MV E Tgtyhjxu01.6cm/sMV DECEL [id number] A Kgkfntgr54.6cm/s

MV AHB25rtC/A ratio1.0MVA (PHT)3.98cm2



TDI

Lateral E' Peak V8.80cm/sMedial E' Peak V10.51cm/sE/Lateral E'9.6

E/Medial E'8.0



Pulmonary Valve

PV Peak Ojpsitkj708.1cm/s



 LEFT VENTRICLE 

The left ventricle is normal size.

There is normal left ventricular wall thickness.

Left ventricle systolic function is normal.

The Ejection Fraction is 60-65%.

There is normal LV segmental wall motion.

Transmitral Doppler flow pattern is Grade I-abnormal relaxation 

pattern.



 RIGHT VENTRICLE 

The right ventricle is normal size.

There is normal right ventricular wall thickness.

The right ventricular systolic function is normal.



 ATRIA 

The left atrium size is normal.

The right atrium size is normal.



 AORTIC VALVE 

The aortic valve is normal in structure.

No aortic regurgitation is present.

There is no aortic valvular stenosis. 



 MITRAL VALVE 

The mitral valve is normal in structure.

There is no evidence of mitral valve prolapse.

There is no mitral valve stenosis.

There is no mitral valve regurgitation noted.



 TRICUSPID VALVE 

The tricuspid valve is normal in structure.

There is no tricuspid valve regurgitation noted.



 PULMONIC VALVE 

The pulmonary valve is normal in structure.

There is no pulmonic valvular regurgitation. 



 GREAT VESSELS 

The aortic root is normal in size.

The IVC is normal in size and collapses >50% with inspiration.



 PERICARDIAL EFFUSION 

The pericardium appears normal.



<Conclusion>

The left ventricle is normal size.

There is normal left ventricular wall thickness.

There is normal LV segmental wall motion.

Left ventricle systolic function is normal.

The Ejection Fraction is 60-65%.

Transmitral Doppler flow pattern is Grade I-abnormal relaxation 

pattern.

## 2018-04-12 ENCOUNTER — HOSPITAL ENCOUNTER (INPATIENT)
Dept: HOSPITAL 14 - H.ER | Age: 63
LOS: 6 days | Discharge: HOME HEALTH | DRG: 203 | End: 2018-04-18
Attending: FAMILY MEDICINE | Admitting: FAMILY MEDICINE
Payer: MEDICARE

## 2018-04-12 VITALS — BODY MASS INDEX: 31.6 KG/M2

## 2018-04-12 DIAGNOSIS — E78.00: ICD-10-CM

## 2018-04-12 DIAGNOSIS — I10: ICD-10-CM

## 2018-04-12 DIAGNOSIS — Z91.041: ICD-10-CM

## 2018-04-12 DIAGNOSIS — J45.51: Primary | ICD-10-CM

## 2018-04-12 DIAGNOSIS — K59.00: ICD-10-CM

## 2018-04-12 DIAGNOSIS — J06.9: ICD-10-CM

## 2018-04-12 DIAGNOSIS — F41.9: ICD-10-CM

## 2018-04-12 DIAGNOSIS — F31.9: ICD-10-CM

## 2018-04-12 DIAGNOSIS — Z87.11: ICD-10-CM

## 2018-04-12 DIAGNOSIS — E11.65: ICD-10-CM

## 2018-04-12 DIAGNOSIS — K29.70: ICD-10-CM

## 2018-04-12 LAB
ALBUMIN SERPL-MCNC: 3.9 G/DL (ref 3.5–5)
ALBUMIN/GLOB SERPL: 1.2 {RATIO} (ref 1–2.1)
ALT SERPL-CCNC: 46 U/L (ref 9–52)
APTT BLD: 45.8 SECONDS (ref 25.6–37.1)
AST SERPL-CCNC: 50 U/L (ref 14–36)
BASOPHILS # BLD AUTO: 0.1 K/UL (ref 0–0.2)
BASOPHILS NFR BLD: 0.7 % (ref 0–2)
BNP SERPL-MCNC: 149 PG/ML (ref 0–900)
BUN SERPL-MCNC: 21 MG/DL (ref 7–17)
CALCIUM SERPL-MCNC: 9.5 MG/DL (ref 8.4–10.2)
EOSINOPHIL # BLD AUTO: 0.3 K/UL (ref 0–0.7)
EOSINOPHIL NFR BLD: 4.4 % (ref 0–4)
ERYTHROCYTE [DISTWIDTH] IN BLOOD BY AUTOMATED COUNT: 13.6 % (ref 11.5–14.5)
GFR NON-AFRICAN AMERICAN: > 60
HGB BLD-MCNC: 15.1 G/DL (ref 12–16)
INR PPP: 1.1 (ref 0.9–1.2)
LYMPHOCYTES # BLD AUTO: 2.8 K/UL (ref 1–4.3)
LYMPHOCYTES NFR BLD AUTO: 37.6 % (ref 20–40)
MCH RBC QN AUTO: 30.5 PG (ref 27–31)
MCHC RBC AUTO-ENTMCNC: 34.2 G/DL (ref 33–37)
MCV RBC AUTO: 89.1 FL (ref 81–99)
MONOCYTES # BLD: 0.5 K/UL (ref 0–0.8)
MONOCYTES NFR BLD: 6.4 % (ref 0–10)
NEUTROPHILS # BLD: 3.8 K/UL (ref 1.8–7)
NEUTROPHILS NFR BLD AUTO: 50.9 % (ref 50–75)
NRBC BLD AUTO-RTO: 0.1 % (ref 0–0)
PLATELET # BLD: 332 K/UL (ref 130–400)
PMV BLD AUTO: 8.6 FL (ref 7.2–11.7)
PROTHROMBIN TIME: 12.3 SECONDS (ref 9.8–13.1)
RBC # BLD AUTO: 4.96 MIL/UL (ref 3.8–5.2)
WBC # BLD AUTO: 7.5 K/UL (ref 4.8–10.8)

## 2018-04-12 NOTE — ED PDOC
HPI: SOB/CHF/COPD


Time Seen by Provider: 04/12/18 21:23


Chief Complaint (Nursing): Cough, Cold, Congestion


Chief Complaint (Provider): Shortness of breath, asthma


History Per: Patient


History/Exam Limitations: no limitations


Onset/Duration Of Symptoms: Days (x4)


Current Symptoms Are (Timing): Still Present


Associated Symptoms: Productive Cough, Other (congestion)


Additional Complaint(s): 





Geraldine Vallejo is a 62 year old female, with a past medical history of asthma, 

HTN and diabetes, who presents to the emergency department complaining of 

shortness of breath and asthma onset for x4 days. Patient has been using 

albuterol with no relief and states it has been getting worst. Patient reports 

severe chest tightness, mild rhinorrhea, cough and congestion but is unable to 

expel phlegm. She denies any fever, chills or leg swelling. No further medical 

complaints.





PMD :Abe Harper





Past Medical History


Reviewed: Historical Data, Nursing Documentation, Vital Signs


Vital Signs: 


 Last Vital Signs











Temp  98.9 F   04/12/18 21:17


 


Pulse  77   04/12/18 21:17


 


Resp  18   04/12/18 21:17


 


BP  196/103 H  04/12/18 21:17


 


Pulse Ox  92 L  04/12/18 22:24














- Medical History


PMH: Anxiety, Asthma, Bipolar Disorder, Depression, Diabetes, Diverticulitis, 

Gastritis, HTN, Hypercholesterolemia


   Denies: HIV, Chronic Kidney Disease





- Surgical History


Surgical History: No Surg Hx





- Family History


Family History: States: Unknown Family Hx, Diabetes, Hypertension





- Social History


Current smoker - smoking cessation education provided: No


Alcohol: None


Drugs: Denies





- Home Medications


Home Medications: 


 Ambulatory Orders











 Medication  Instructions  Recorded


 


Aspirin 81 mg PO HS 04/27/15


 


Calcium/Vitamin D [Calcium + D 600 1 tab PO BID 04/27/15





mg-200 Iu]  


 


Montelukast [Singulair] 10 mg PO HS 04/27/15


 


Omeprazole [Prilosec] 40 mg PO DAILY 04/27/15


 


Atorvastatin Calcium [Lipitor] 40 mg PO DAILY #0 tab 04/28/15


 


Albuterol HFA [Ventolin HFA 90 90 mcg PO PRN PRN 30 Days 08/01/17





mcg/actuation (8 g)]  


 


Lisinopril [Zestril] 40 mg PO DAILY #30 08/01/17


 


hydroCHLOROthiazide [Hydrodiuril] 25 mg PO DAILY #30 08/01/17


 


metFORMIN [glucOPHAGE] 500 mg PO BID #30 08/01/17


 


Escitalopram [Lexapro] 20 mg PO DAILY 11/22/17


 


Budesonide/Formoterol Fumarate 2 puff IH BID #1 hfa.aer.ad 11/24/17





[Symbicort 80-4.5 Mcg Inhaler]  


 


predniSONE [predniSONE Tab] 60 mg PO DAILY #6 tab 11/24/17














- Allergies


Allergies/Adverse Reactions: 


 Allergies











Allergy/AdvReac Type Severity Reaction Status Date / Time


 


iodine Allergy  RASH Verified 11/21/17 20:31














Review of Systems


ROS Statement: Except As Marked, All Systems Reviewed And Found Negative


Constitutional: Negative for: Fever, Chills


ENT: Positive for: Nose Discharge (mild rhinorrhea)


Cardiovascular: Positive for: Chest Pain (tightness)


Respiratory: Positive for: Cough (congestion), Shortness of Breath


Musculoskeletal: Negative for: Leg Pain (swelling)





Physical Exam





- Reviewed


Nursing Documentation Reviewed: Yes


Vital Signs Reviewed: Yes





- Physical Exam


Head Exam: Positive for: ATRAUMATIC, NORMAL INSPECTION, NORMOCEPHALIC


Skin: Positive for: Normal Color, Warm, Dry


Eye Exam: Positive for: EOMI, PERRL


ENT: Negative for: Pharyngeal Erythema, Tonsillar Exudate


Neck: Positive for: Painless ROM, Supple


Cardiovascular/Chest: Positive for: Regular Rate, Rhythm.  Negative for: Edema, 

Murmur


Respiratory: Positive for: Accessory Muscle Use (some ), Wheezing (diffused 

expiratory and inspiratory ), Respiratory Distress


Gastrointestinal/Abdominal: Positive for: Soft.  Negative for: Tenderness


Back: Positive for: Normal Inspection.  Negative for: Decreased ROM


Extremity: Positive for: Normal ROM.  Negative for: Deformity, Swelling


Lymphatic: Negative for: Adenopathy


Neurologic/Psych: Positive for: Alert, Oriented.  Negative for: Motor/Sensory 

Deficits





- Laboratory Results


Result Diagrams: 


 04/12/18 22:00





 04/12/18 22:00





- ECG


O2 Sat by Pulse Oximetry: 92 (RA)


Pulse Ox Interpretation: Abnormal





Medical Decision Making


Medical Decision Making: 





Initial Impression: Asthma exacerbation





Initial Plan:





--EKG


--B-Type Natriuretic Peptide


--CMP


--Magnesium


--Phosphorus


--Troponin I Q8H


--Urine dipstick


--CBC w/ differential


--PTT


--PT


--Chest portable [RAD]


--Duoneb 3 ml INH


--SOLU-medrol 125 mg IVP


--Blood culture


--Peak flow pre/post Tx


--Influenza A B


--Reevaluation


 1145p Persistent wheeze. Labs with no emergently significant abnormalities. 

CXR with no acute disease. [facility] resident for hospitalization for 

asthma exacerbation





--------------------------------------------------------------------------------

-----------------   


Scribe Attestation:


Documented by Marshall Guerra, acting as a scribe for Bhavya Soria MD





Provider Scribe Attestation:


All medical record entries made by the Scribe were at my direction and 

personally dictated by me. I have reviewed the chart and agree that the record 

accurately reflects my personal performance of the history, physical exam, 

medical decision making, and the department course for this patient. I have 

also personally directed, reviewed, and agree with the discharge instructions 

and disposition.





Disposition





- Clinical Impression


Clinical Impression: 


 Asthma with severe exacerbation





Counseled Patient/Family Regarding: Studies Performed, Diagnosis





- Disposition


Referrals: 


Abe Harper MD [Family Provider] - 


Disposition Time: 23:45


Condition: FAIR


Forms:  [facility] (English)





- Pt Status Changed To:


Hospital Disposition Of: Observation





- POA


Present On Arrival: None

## 2018-04-13 LAB
BASOPHILS # BLD AUTO: 0 K/UL (ref 0–0.2)
BASOPHILS NFR BLD: 0.2 % (ref 0–2)
BUN SERPL-MCNC: 22 MG/DL (ref 7–17)
CALCIUM SERPL-MCNC: 9.4 MG/DL (ref 8.4–10.2)
EOSINOPHIL # BLD AUTO: 0 K/UL (ref 0–0.7)
EOSINOPHIL NFR BLD: 0.1 % (ref 0–4)
ERYTHROCYTE [DISTWIDTH] IN BLOOD BY AUTOMATED COUNT: 13.5 % (ref 11.5–14.5)
GFR NON-AFRICAN AMERICAN: > 60
HGB BLD-MCNC: 14.6 G/DL (ref 12–16)
LYMPHOCYTES # BLD AUTO: 0.7 K/UL (ref 1–4.3)
LYMPHOCYTES NFR BLD AUTO: 12.1 % (ref 20–40)
MCH RBC QN AUTO: 30 PG (ref 27–31)
MCHC RBC AUTO-ENTMCNC: 33.5 G/DL (ref 33–37)
MCV RBC AUTO: 89.5 FL (ref 81–99)
MONOCYTES # BLD: 0.1 K/UL (ref 0–0.8)
MONOCYTES NFR BLD: 1.1 % (ref 0–10)
NEUTROPHILS # BLD: 5.2 K/UL (ref 1.8–7)
NEUTROPHILS NFR BLD AUTO: 86.5 % (ref 50–75)
NRBC BLD AUTO-RTO: 0 % (ref 0–0)
PLATELET # BLD: 305 K/UL (ref 130–400)
PMV BLD AUTO: 8.5 FL (ref 7.2–11.7)
RBC # BLD AUTO: 4.88 MIL/UL (ref 3.8–5.2)
WBC # BLD AUTO: 6 K/UL (ref 4.8–10.8)

## 2018-04-13 PROCEDURE — 3E0F73Z INTRODUCTION OF ANTI-INFLAMMATORY INTO RESPIRATORY TRACT, VIA NATURAL OR ARTIFICIAL OPENING: ICD-10-PCS | Performed by: INTERNAL MEDICINE

## 2018-04-13 RX ADMIN — GUAIFENESIN AND DEXTROMETHORPHAN HYDROBROMIDE SCH TAB: 600; 30 TABLET, EXTENDED RELEASE ORAL at 02:03

## 2018-04-13 RX ADMIN — IPRATROPIUM BROMIDE AND ALBUTEROL SULFATE SCH: .5; 3 SOLUTION RESPIRATORY (INHALATION) at 04:00

## 2018-04-13 RX ADMIN — FLUTICASONE PROPIONATE AND SALMETEROL SCH PUFF: 50; 250 POWDER RESPIRATORY (INHALATION) at 08:45

## 2018-04-13 RX ADMIN — IPRATROPIUM BROMIDE AND ALBUTEROL SULFATE SCH ML: .5; 3 SOLUTION RESPIRATORY (INHALATION) at 23:10

## 2018-04-13 RX ADMIN — IPRATROPIUM BROMIDE AND ALBUTEROL SULFATE SCH ML: .5; 3 SOLUTION RESPIRATORY (INHALATION) at 07:31

## 2018-04-13 RX ADMIN — GUAIFENESIN AND DEXTROMETHORPHAN HYDROBROMIDE SCH TAB: 600; 30 TABLET, EXTENDED RELEASE ORAL at 08:46

## 2018-04-13 RX ADMIN — IPRATROPIUM BROMIDE AND ALBUTEROL SULFATE SCH ML: .5; 3 SOLUTION RESPIRATORY (INHALATION) at 11:15

## 2018-04-13 RX ADMIN — PANTOPRAZOLE SODIUM SCH MG: 40 TABLET, DELAYED RELEASE ORAL at 08:47

## 2018-04-13 RX ADMIN — CALCIUM CARBONATE-VITAMIN D TAB 500 MG-200 UNIT SCH TAB: 500-200 TAB at 08:47

## 2018-04-13 RX ADMIN — FLUTICASONE PROPIONATE AND SALMETEROL SCH PUFF: 50; 250 POWDER RESPIRATORY (INHALATION) at 22:26

## 2018-04-13 RX ADMIN — GUAIFENESIN AND DEXTROMETHORPHAN HYDROBROMIDE SCH TAB: 600; 30 TABLET, EXTENDED RELEASE ORAL at 16:31

## 2018-04-13 RX ADMIN — CALCIUM CARBONATE-VITAMIN D TAB 500 MG-200 UNIT SCH TAB: 500-200 TAB at 16:31

## 2018-04-13 RX ADMIN — IPRATROPIUM BROMIDE AND ALBUTEROL SULFATE SCH ML: .5; 3 SOLUTION RESPIRATORY (INHALATION) at 19:55

## 2018-04-13 RX ADMIN — IPRATROPIUM BROMIDE AND ALBUTEROL SULFATE SCH ML: .5; 3 SOLUTION RESPIRATORY (INHALATION) at 15:27

## 2018-04-13 NOTE — CARD
--------------- APPROVED REPORT --------------





EKG Measurement

Heart [id number]

TX [id number]

[id number]

[id number]

[id number]



<Conclusion>

Sinus rhythm with short TX

Otherwise normal ECG

## 2018-04-13 NOTE — CP.PCM.HP
History of Present Illness





- History of Present Illness


History of Present Illness: 





63 y/o F with PMHx of HTN, DM type 2, Stomach Ulcer, Gastritis, Asthma, 

presents to ED complaining of SOB and wheezing. Patient states that 5 days ago 

she started feeling short of breath and fatigue with minimal activities. 4 days 

ago her SOB started getting worse, and she started using her rescue albuterol 

every 2 hours with partially relieve of her symptoms. Her SOB was associated 

with wheezing, chest tightness, wet cough with white-green sputum production, 

nausea without vomiting, low grade fevers 100.1 (last fever was yesterday) and 

poor appetite. Feeling a bit better after duoneb x 3 in ER, but still SOB, and 

wheezing.





PMD: Dr. PORTIA Harper at [facility]


Full Code


PMHX:HTN, DM type 2, Stomach Ulcer, Gastritis, Asthma


Allergies: Iodine: burning sensation, an unknown antibiotic: rash


FHx: Mother: passed from breast cancer, Father: passed from hepatitis


SHx: Hysterectomy, Cardiac cath (12 years ago) without stent placement, Lipoma 

removal


Socialhx: never smoker, denies etoh/illicit drugs





ER course:


VS:afebrile, BP: 141/79, HR: 89, 96 % RA


PE:diffuse and bilateral wheezing to auscultation, no rhonchi or rales


Lbs:CBC, CMP, coag panel unremarkable, influenza A B neg, troponin I x 1 neg


CXR:read by me , no gross evidence of acute infiltrate, pending official report


TX: Duoneb x 3, methylprednisolone 125 mg IV once 








Present on Admission





- Present on Admission


Any Indicators Present on Admission: No


History of DVT/PE: No


History of Uncontrolled Diabetes: No


Urinary Catheter: No


Decubitus Ulcer Present: No





Review of Systems





- Review of Systems


All systems: reviewed and no additional remarkable complaints except (as per HPI

)





Past Patient History





- Infectious Disease


Hx of Infectious Diseases: None





- Past Medical History & Family History


Past Medical History?: Yes





- Past Social History


Alcohol: None


Drugs: Denies





- CARDIAC


Hx Hypercholesterolemia: Yes


Hx Hypertension: Yes





- PULMONARY


Hx Asthma: Yes





- NEUROLOGICAL


Hx Neurological Disorder: No





- HEENT


Hx HEENT Problems: No





- RENAL


Hx Chronic Kidney Disease: No





- ENDOCRINE/METABOLIC


Hx Endocrine Disorders: Yes


Hx Diabetes Mellitus Type 2: Yes





- HEMATOLOGICAL/ONCOLOGICAL


Hx Human Immunodeficiency Virus (HIV): No





- INTEGUMENTARY


Hx Dermatological Problems: No





- MUSCULOSKELETAL/RHEUMATOLOGICAL


Hx Musculoskeletal Disorders: No


Hx Falls: No





- GASTROINTESTINAL


Hx Diverticulitis: Yes


Hx Gastritis: Yes





- GENITOURINARY/GYNECOLOGICAL


Hx Genitourinary Disorders: No





- PSYCHIATRIC


Hx Anxiety: Yes


Hx Bipolar Disorder: Yes


Hx Depression: Yes





- SURGICAL HISTORY


Hx Surgeries: Yes


Hx Cardiac Catheterization: Yes


Hx Hysterectomy: Yes


Other/Comment: left lipoma. oophorectomy





- ANESTHESIA


Hx Anesthesia: Yes


Hx Anesthesia Reactions: No


Hx Malignant Hyperthermia: No





Meds


Allergies/Adverse Reactions: 


 Allergies











Allergy/AdvReac Type Severity Reaction Status Date / Time


 


iodine Allergy  RASH Verified 11/21/17 20:31














Physical Exam





- Constitutional


Appears: Non-toxic, No Acute Distress





- Eye Exam


Eye Exam: Normal appearance





- ENT Exam


ENT Exam: Mucous Membranes Moist





- Respiratory Exam


Respiratory Exam: Wheezes, NORMAL BREATHING PATTERN.  absent: Chest Wall 

Tenderness, Rales, Respiratory Distress, Stridor





- Cardiovascular Exam


Cardiovascular Exam: REGULAR RHYTHM, +S1, +S2.  absent: Bradycardia, Tachycardia





- GI/Abdominal Exam


GI & Abdominal Exam: Normal Bowel Sounds, Soft.  absent: Distended, Guarding, 

Rebound, Rigid, Tenderness





- Extremities Exam


Extremities exam: Positive for: normal inspection.  Negative for: calf 

tenderness, pedal edema





- Back Exam


Back exam: NORMAL INSPECTION.  absent: CVA tenderness (L), CVA tenderness (R)





- Neurological Exam


Neurological exam: Alert, Oriented x3





- Psychiatric Exam


Psychiatric exam: Normal Affect, Normal Mood





- Skin


Skin Exam: Dry, Intact, Normal Color





Results





- Vital Signs


Recent Vital Signs: 





 Last Vital Signs











Temp  98.0 F   04/13/18 00:40


 


Pulse  89   04/13/18 00:40


 


Resp  18   04/13/18 00:40


 


BP  141/79   04/13/18 00:40


 


Pulse Ox  96   04/13/18 00:40














- Labs


Result Diagrams: 


 04/12/18 22:00





 04/12/18 22:00


Labs: 





 Laboratory Results - last 24 hr











  04/12/18 04/12/18 04/12/18





  22:00 22:00 22:00


 


WBC   7.5 


 


RBC   4.96 


 


Hgb   15.1 


 


Hct   44.2 


 


MCV   89.1 


 


MCH   30.5 


 


MCHC   34.2 


 


RDW   13.6 


 


Plt Count   332 


 


MPV   8.6 


 


Neut % (Auto)   50.9 


 


Lymph % (Auto)   37.6 


 


Mono % (Auto)   6.4 


 


Eos % (Auto)   4.4 H 


 


Baso % (Auto)   0.7 


 


Neut # (Auto)   3.8 


 


Lymph # (Auto)   2.8 


 


Mono # (Auto)   0.5 


 


Eos # (Auto)   0.3 


 


Baso # (Auto)   0.1 


 


PT    12.3


 


INR    1.1


 


APTT    45.8 H


 


Sodium  147  


 


Potassium  3.9  


 


Chloride  105  


 


Carbon Dioxide  28  


 


Anion Gap  18  


 


BUN  21 H  


 


Creatinine  0.8  


 


Est GFR ( Amer)  > 60  


 


Est GFR (Non-Af Amer)  > 60  


 


Random Glucose  164 H  


 


Calcium  9.5  


 


Phosphorus  3.7  


 


Magnesium  1.9  


 


Total Bilirubin  0.6  


 


AST  50 H  


 


ALT  46  


 


Alkaline Phosphatase  100  


 


Troponin I  < 0.0120  


 


NT-Pro-B Natriuret Pep  149  


 


Total Protein  7.2  


 


Albumin  3.9  


 


Globulin  3.3  


 


Albumin/Globulin Ratio  1.2  


 


Influenza Typ A,B (EIA)   














  04/12/18





  22:30


 


WBC 


 


RBC 


 


Hgb 


 


Hct 


 


MCV 


 


MCH 


 


MCHC 


 


RDW 


 


Plt Count 


 


MPV 


 


Neut % (Auto) 


 


Lymph % (Auto) 


 


Mono % (Auto) 


 


Eos % (Auto) 


 


Baso % (Auto) 


 


Neut # (Auto) 


 


Lymph # (Auto) 


 


Mono # (Auto) 


 


Eos # (Auto) 


 


Baso # (Auto) 


 


PT 


 


INR 


 


APTT 


 


Sodium 


 


Potassium 


 


Chloride 


 


Carbon Dioxide 


 


Anion Gap 


 


BUN 


 


Creatinine 


 


Est GFR ( Amer) 


 


Est GFR (Non-Af Amer) 


 


Random Glucose 


 


Calcium 


 


Phosphorus 


 


Magnesium 


 


Total Bilirubin 


 


AST 


 


ALT 


 


Alkaline Phosphatase 


 


Troponin I 


 


NT-Pro-B Natriuret Pep 


 


Total Protein 


 


Albumin 


 


Globulin 


 


Albumin/Globulin Ratio 


 


Influenza Typ A,B (EIA)  Negative for flu a/b














Assessment & Plan





- Assessment and Plan (Free Text)


Assessment: 





63 y/o F with PMHx of HTN, DM type 2, Stomach Ulcer, Gastritis, Asthma admitted 

with asthma exacerbation. 


Plan: 








Asthma Exacerbation


-MedSurg unit


-most likely 2/2 viral upper respiratory infection


-c/w Duoneb neb every 4 hours kervin


-c/w Methylprednisolone 60 mg IV daily, and titrate as needed


-oxygen supplementation via NC at 2 LPM to maintain oxyg sat >94 %


-encourage fluids intake


-f/u respiratory status


-resume home advair diskus inh Q12


-mucinex DM 


-chest physiotherapy TID


-f/u troponin x 2


-f/u blood cx


-CBC showed eosinophilia on admission


-f/u repeat CBC


CXR:read by me , no gross evidence of acute infiltrate, pending official report





Hypertension


-c/w home meds





Diabetes Mellitus type 2


-c/w home metformin 


-accucheck before breakfast


-hypoglycemic protocol


-diabetic diet





DVT prophylaxis


-SDCs





- Date & Time


Date: 04/13/18


Time: 01:35

## 2018-04-13 NOTE — RAD
HISTORY:

asthma sob  



COMPARISON:

Chest radiographs 11/21/2017. 



FINDINGS:



LUNGS:

No interval infiltrate is identified bilaterally. A small calcified 

granuloma is again seen the right upper lung zone laterally.



PLEURA:

No significant pleural effusion identified, no pneumothorax apparent.



CARDIOVASCULAR:

Normal.



OSSEOUS STRUCTURES:

No significant abnormalities.



VISUALIZED UPPER ABDOMEN:

Normal.



OTHER FINDINGS:

None.



IMPRESSION:

No interval acute cardiopulmonary disease appreciated.

## 2018-04-14 LAB
ALBUMIN SERPL-MCNC: 3.9 G/DL (ref 3.5–5)
ALBUMIN/GLOB SERPL: 1.2 {RATIO} (ref 1–2.1)
ALT SERPL-CCNC: 43 U/L (ref 9–52)
AST SERPL-CCNC: 26 U/L (ref 14–36)
BUN SERPL-MCNC: 23 MG/DL (ref 7–17)
CALCIUM SERPL-MCNC: 9.3 MG/DL (ref 8.4–10.2)
ERYTHROCYTE [DISTWIDTH] IN BLOOD BY AUTOMATED COUNT: 13.4 % (ref 11.5–14.5)
GFR NON-AFRICAN AMERICAN: > 60
HGB BLD-MCNC: 14.9 G/DL (ref 12–16)
MCH RBC QN AUTO: 30.3 PG (ref 27–31)
MCHC RBC AUTO-ENTMCNC: 34.1 G/DL (ref 33–37)
MCV RBC AUTO: 88.8 FL (ref 81–99)
PLATELET # BLD: 325 K/UL (ref 130–400)
RBC # BLD AUTO: 4.91 MIL/UL (ref 3.8–5.2)
WBC # BLD AUTO: 8.9 K/UL (ref 4.8–10.8)

## 2018-04-14 RX ADMIN — IPRATROPIUM BROMIDE AND ALBUTEROL SULFATE SCH ML: .5; 3 SOLUTION RESPIRATORY (INHALATION) at 05:07

## 2018-04-14 RX ADMIN — IPRATROPIUM BROMIDE AND ALBUTEROL SULFATE SCH ML: .5; 3 SOLUTION RESPIRATORY (INHALATION) at 11:13

## 2018-04-14 RX ADMIN — GUAIFENESIN AND DEXTROMETHORPHAN HYDROBROMIDE SCH: 600; 30 TABLET, EXTENDED RELEASE ORAL at 09:56

## 2018-04-14 RX ADMIN — PANTOPRAZOLE SODIUM SCH MG: 40 TABLET, DELAYED RELEASE ORAL at 08:49

## 2018-04-14 RX ADMIN — CALCIUM CARBONATE-VITAMIN D TAB 500 MG-200 UNIT SCH TAB: 500-200 TAB at 08:49

## 2018-04-14 RX ADMIN — FLUTICASONE PROPIONATE AND SALMETEROL SCH PUFF: 50; 250 POWDER RESPIRATORY (INHALATION) at 08:48

## 2018-04-14 RX ADMIN — IPRATROPIUM BROMIDE AND ALBUTEROL SULFATE SCH: .5; 3 SOLUTION RESPIRATORY (INHALATION) at 14:00

## 2018-04-14 RX ADMIN — CALCIUM CARBONATE-VITAMIN D TAB 500 MG-200 UNIT SCH TAB: 500-200 TAB at 16:45

## 2018-04-14 RX ADMIN — IPRATROPIUM BROMIDE AND ALBUTEROL SULFATE SCH ML: .5; 3 SOLUTION RESPIRATORY (INHALATION) at 07:28

## 2018-04-14 RX ADMIN — IPRATROPIUM BROMIDE AND ALBUTEROL SULFATE SCH ML: .5; 3 SOLUTION RESPIRATORY (INHALATION) at 16:00

## 2018-04-14 RX ADMIN — GUAIFENESIN AND DEXTROMETHORPHAN HYDROBROMIDE SCH TAB: 600; 30 TABLET, EXTENDED RELEASE ORAL at 16:36

## 2018-04-14 RX ADMIN — ENOXAPARIN SODIUM SCH MG: 40 INJECTION SUBCUTANEOUS at 08:48

## 2018-04-14 RX ADMIN — FLUTICASONE PROPIONATE AND SALMETEROL SCH PUFF: 50; 250 POWDER RESPIRATORY (INHALATION) at 21:20

## 2018-04-14 RX ADMIN — IPRATROPIUM BROMIDE AND ALBUTEROL SULFATE SCH ML: .5; 3 SOLUTION RESPIRATORY (INHALATION) at 19:18

## 2018-04-14 RX ADMIN — IPRATROPIUM BROMIDE AND ALBUTEROL SULFATE SCH ML: .5; 3 SOLUTION RESPIRATORY (INHALATION) at 15:55

## 2018-04-14 NOTE — CP.PCM.PN
Subjective





- Date & Time of Evaluation


Date of Evaluation: 04/14/18


Time of Evaluation: 12:36





- Subjective


Subjective: 





no overnight events. Feeling better. Denies chest pain/SOB. Making UOP/BM, 

eating/drinking, walking.





Objective





- Vital Signs/Intake and Output


Vital Signs (last 24 hours): 


 











Temp Pulse Resp BP Pulse Ox


 


 97.6 F   74   20   110/75   94 L


 


 04/14/18 08:45  04/14/18 08:49  04/14/18 08:45  04/14/18 08:49  04/14/18 08:45











- Medications


Medications: 


 Current Medications





Albuterol/Ipratropium (Duoneb 3 Mg/0.5 Mg (3 Ml) Ud)  3 ml INH RQ3 JUDY


Aspirin (Aspirin Chewable)  81 mg PO HS [facility]


   Last Admin: 04/13/18 22:27 Dose:  81 mg


Atorvastatin Calcium (Lipitor)  40 mg PO DAILY [facility]


   Last Admin: 04/14/18 08:50 Dose:  40 mg


Calcium/Vitamin D (Oyster Shell Calcium/Vitamin D 500 Mg-200 Iu)  1 tab PO BID 

[facility]


   Last Admin: 04/14/18 08:49 Dose:  1 tab


Dextrose (Dextrose 50% Inj)  0 ml IV STAT PRN; Protocol


   PRN Reason: Hypoglycemia Protocol


Dextrose (Glutose 15)  0 gm PO ONCE PRN; Protocol


   PRN Reason: Hypoglycemia Protocol


Enoxaparin Sodium (Lovenox)  40 mg SC DAILY [facility]


   PRN Reason: Protocol


   Last Admin: 04/14/18 08:48 Dose:  40 mg


Escitalopram Oxalate (Lexapro)  20 mg PO DAILY [facility]


   Last Admin: 04/14/18 08:48 Dose:  20 mg


Glucagon (Glucagen Diagnostic Kit)  0 mg IM STAT PRN; Protocol


   PRN Reason: Hypoglycemia Protocol


Guaifenesin/Dextromethorphan (Mucinex-Dm 600-30 Mg)  1 tab PO BID [facility]


   Last Admin: 04/14/18 09:56 Dose:  Not Given


Hydrochlorothiazide (Hydrodiuril)  25 mg PO DAILY [facility]


   Last Admin: 04/14/18 09:54 Dose:  25 mg


Insulin Human Regular (Humulin R)  0 units SC ACHS [facility]


   PRN Reason: Protocol


   Last Admin: 04/14/18 12:34 Dose:  3 units


Lisinopril (Zestril)  40 mg PO DAILY [facility]


   Last Admin: 04/14/18 08:49 Dose:  40 mg


Metformin HCl (Glucophage)  1,000 mg PO BID [facility]


   Last Admin: 04/14/18 09:46 Dose:  Not Given


Methylprednisolone (Solu-Medrol)  60 mg IVP BID [facility]


Montelukast Sodium (Singulair)  10 mg PO HS [facility]


   Last Admin: 04/13/18 22:27 Dose:  10 mg


Ondansetron HCl (Zofran Tab)  4 mg PO Q6 PRN


   PRN Reason: Nausea/Vomiting


   Last Admin: 04/13/18 15:27 Dose:  4 mg


Pantoprazole Sodium (Protonix Ec Tab)  40 mg PO DAILY [facility]


   Last Admin: 04/14/18 08:49 Dose:  40 mg


Promethazine HCl/Codeine (Phenergan/Codeine Oral Syrup)  5 ml PO Q6 PRN


   PRN Reason: Cough


Fluticasone/Salmeterol (Advair Diskus 250/50)  1 puff IH Q12 [facility]


   Last Admin: 04/14/18 08:48 Dose:  1 puff











- Labs


Labs: 


 





 04/14/18 06:00 





 04/14/18 06:00 





 











PT  12.3 Seconds (9.8-13.1)   04/12/18  22:00    


 


INR  1.1  (0.9-1.2)   04/12/18  22:00    


 


APTT  45.8 Seconds (25.6-37.1)  H  04/12/18  22:00    














- Constitutional


Appears: Non-toxic, No Acute Distress





- Head Exam


Head Exam: ATRAUMATIC, NORMAL INSPECTION





- Eye Exam


Eye Exam: Normal appearance





- ENT Exam


ENT Exam: Mucous Membranes Moist





- Neck Exam


Neck Exam: Full ROM, Normal Inspection





- Respiratory Exam


Respiratory Exam: Wheezes





- Cardiovascular Exam


Cardiovascular Exam: REGULAR RHYTHM





- GI/Abdominal Exam


GI & Abdominal Exam: Soft





- Extremities Exam


Extremities Exam: Normal Inspection





- Back Exam


Back Exam: NORMAL INSPECTION





- Neurological Exam


Neurological Exam: Alert, Oriented x3





- Skin


Skin Exam: Dry, Warm





Assessment and Plan





- Assessment and Plan (Free Text)


Assessment: 





Assessment: 


61 y/o F with PMHx of HTN, DM type 2, Stomach Ulcer, Gastritis, Asthma admitted 

with asthma exacerbation. 





Plan: increase steroids, maintain other med frequency/doses





Asthma Exacerbation


-MedSurg unit


-most likely 2/2 viral upper respiratory infection


-Duoneb neb every 4 hours [facility]


-Methylprednisolone 60 mg IV BID


-advair diskus inh Q12


-mucinex DM 


-chest physiotherapy TID





Hypertension


-c/w home meds





Diabetes Mellitus type 2


-c/w home metformin 


-accucheck before breakfast


-hypoglycemic protocol


-diabetic diet





DVT prophylaxis


-lovenox

## 2018-04-15 LAB
BUN SERPL-MCNC: 30 MG/DL (ref 7–17)
CALCIUM SERPL-MCNC: 10.3 MG/DL (ref 8.4–10.2)
GFR NON-AFRICAN AMERICAN: > 60

## 2018-04-15 RX ADMIN — IPRATROPIUM BROMIDE AND ALBUTEROL SULFATE SCH ML: .5; 3 SOLUTION RESPIRATORY (INHALATION) at 20:14

## 2018-04-15 RX ADMIN — ENOXAPARIN SODIUM SCH MG: 40 INJECTION SUBCUTANEOUS at 08:10

## 2018-04-15 RX ADMIN — IPRATROPIUM BROMIDE AND ALBUTEROL SULFATE SCH ML: .5; 3 SOLUTION RESPIRATORY (INHALATION) at 07:46

## 2018-04-15 RX ADMIN — IPRATROPIUM BROMIDE AND ALBUTEROL SULFATE SCH ML: .5; 3 SOLUTION RESPIRATORY (INHALATION) at 10:37

## 2018-04-15 RX ADMIN — IPRATROPIUM BROMIDE AND ALBUTEROL SULFATE SCH ML: .5; 3 SOLUTION RESPIRATORY (INHALATION) at 23:53

## 2018-04-15 RX ADMIN — GUAIFENESIN AND DEXTROMETHORPHAN HYDROBROMIDE SCH TAB: 600; 30 TABLET, EXTENDED RELEASE ORAL at 08:10

## 2018-04-15 RX ADMIN — IPRATROPIUM BROMIDE AND ALBUTEROL SULFATE SCH ML: .5; 3 SOLUTION RESPIRATORY (INHALATION) at 00:23

## 2018-04-15 RX ADMIN — IPRATROPIUM BROMIDE AND ALBUTEROL SULFATE SCH: .5; 3 SOLUTION RESPIRATORY (INHALATION) at 04:30

## 2018-04-15 RX ADMIN — IPRATROPIUM BROMIDE AND ALBUTEROL SULFATE SCH ML: .5; 3 SOLUTION RESPIRATORY (INHALATION) at 13:59

## 2018-04-15 RX ADMIN — GUAIFENESIN AND DEXTROMETHORPHAN HYDROBROMIDE SCH TAB: 600; 30 TABLET, EXTENDED RELEASE ORAL at 16:13

## 2018-04-15 RX ADMIN — PANTOPRAZOLE SODIUM SCH MG: 40 TABLET, DELAYED RELEASE ORAL at 08:10

## 2018-04-15 RX ADMIN — CALCIUM CARBONATE-VITAMIN D TAB 500 MG-200 UNIT SCH TAB: 500-200 TAB at 16:16

## 2018-04-15 RX ADMIN — IPRATROPIUM BROMIDE AND ALBUTEROL SULFATE SCH ML: .5; 3 SOLUTION RESPIRATORY (INHALATION) at 17:48

## 2018-04-15 RX ADMIN — IPRATROPIUM BROMIDE AND ALBUTEROL SULFATE SCH ML: .5; 3 SOLUTION RESPIRATORY (INHALATION) at 04:30

## 2018-04-15 RX ADMIN — CALCIUM CARBONATE-VITAMIN D TAB 500 MG-200 UNIT SCH TAB: 500-200 TAB at 08:09

## 2018-04-15 RX ADMIN — FLUTICASONE PROPIONATE AND SALMETEROL SCH PUFF: 50; 250 POWDER RESPIRATORY (INHALATION) at 08:08

## 2018-04-15 RX ADMIN — FLUTICASONE PROPIONATE AND SALMETEROL SCH PUFF: 50; 250 POWDER RESPIRATORY (INHALATION) at 20:36

## 2018-04-15 NOTE — CP.PCM.PN
Addendum entered and electronically signed by Gurpreet Bee MD  04/15/18 11:33: 





Peak flow: 210





Original Note:








Subjective





- Date & Time of Evaluation


Date of Evaluation: 04/15/18


Time of Evaluation: 09:20





- Subjective


Subjective: 





Pt seen and examined at bedside. Reports improvement in breathing. Nausea 

alleviated with zofran. Tolerating PO diet. Ambulating





Objective





- Vital Signs/Intake and Output


Vital Signs (last 24 hours): 


 











Temp Pulse Resp BP Pulse Ox


 


 97.5 F L  55 L  20   116/74   95 


 


 04/15/18 08:11  04/15/18 08:11  04/15/18 08:11  04/15/18 08:11  04/15/18 08:11











- Medications


Medications: 


 Current Medications





Albuterol/Ipratropium (Duoneb 3 Mg/0.5 Mg (3 Ml) Ud)  3 ml INH RQ3 [facility]


   Last Admin: 04/15/18 07:46 Dose:  3 ml


Aspirin (Aspirin Chewable)  81 mg PO HS [facility]


   Last Admin: 04/14/18 21:20 Dose:  81 mg


Atorvastatin Calcium (Lipitor)  40 mg PO DAILY [facility]


   Last Admin: 04/15/18 08:10 Dose:  40 mg


Calcium/Vitamin D (Oyster Shell Calcium/Vitamin D 500 Mg-200 Iu)  1 tab PO BID 

[facility]


   Last Admin: 04/15/18 08:09 Dose:  1 tab


Dextrose (Dextrose 50% Inj)  0 ml IV STAT PRN; Protocol


   PRN Reason: Hypoglycemia Protocol


Dextrose (Glutose 15)  0 gm PO ONCE PRN; Protocol


   PRN Reason: Hypoglycemia Protocol


Enoxaparin Sodium (Lovenox)  40 mg SC DAILY [facility]


   PRN Reason: Protocol


   Last Admin: 04/15/18 08:10 Dose:  40 mg


Escitalopram Oxalate (Lexapro)  20 mg PO DAILY [facility]


   Last Admin: 04/15/18 08:10 Dose:  20 mg


Glucagon (Glucagen Diagnostic Kit)  0 mg IM STAT PRN; Protocol


   PRN Reason: Hypoglycemia Protocol


Guaifenesin/Dextromethorphan (Mucinex-Dm 600-30 Mg)  1 tab PO BID [facility]


   Last Admin: 04/15/18 08:10 Dose:  1 tab


Hydrochlorothiazide (Hydrodiuril)  25 mg PO DAILY [facility]


   Last Admin: 04/15/18 08:10 Dose:  25 mg


Insulin Human Regular (Humulin R)  0 units SC [facility]


   PRN Reason: Protocol


Lisinopril (Zestril)  40 mg PO DAILY [facility]


   Last Admin: 04/15/18 08:11 Dose:  40 mg


Metformin HCl (Glucophage)  1,000 mg PO BID [facility]


   Last Admin: 04/15/18 08:09 Dose:  1,000 mg


Methylprednisolone (Solu-Medrol)  60 mg IVP BID [facility]


   Last Admin: 04/15/18 08:08 Dose:  60 mg


Montelukast Sodium (Singulair)  10 mg PO HS [facility]


   Last Admin: 04/14/18 21:20 Dose:  10 mg


Ondansetron HCl (Zofran Tab)  4 mg PO Q6 PRN


   PRN Reason: Nausea/Vomiting


   Last Admin: 04/13/18 15:27 Dose:  4 mg


Pantoprazole Sodium (Protonix Ec Tab)  40 mg PO DAILY [facility]


   Last Admin: 04/15/18 08:10 Dose:  40 mg


Promethazine HCl/Codeine (Phenergan/Codeine Oral Syrup)  5 ml PO Q6 PRN


   PRN Reason: Cough


Fluticasone/Salmeterol (Advair Diskus 250/50)  1 puff IH Q12 [facility]


   Last Admin: 04/15/18 08:08 Dose:  1 puff











- Labs


Labs: 


 





 04/14/18 06:00 





 04/15/18 07:15 





 











PT  12.3 Seconds (9.8-13.1)   04/12/18  22:00    


 


INR  1.1  (0.9-1.2)   04/12/18  22:00    


 


APTT  45.8 Seconds (25.6-37.1)  H  04/12/18  22:00    














- Constitutional


Appears: Well, No Acute Distress





- Eye Exam


Eye Exam: EOMI





- ENT Exam


ENT Exam: Mucous Membranes Moist





- Neck Exam


Neck Exam: Full ROM





- Respiratory Exam


Respiratory Exam: Wheezes (expiratory ).  absent: Respiratory Distress





- Cardiovascular Exam


Cardiovascular Exam: REGULAR RHYTHM, +S1, +S2





- GI/Abdominal Exam


GI & Abdominal Exam: Soft, Normal Bowel Sounds.  absent: Tenderness





- Extremities Exam


Extremities Exam: Full ROM





- Neurological Exam


Neurological Exam: Alert, Awake, CN II-XII Intact, Oriented x3





- Psychiatric Exam


Psychiatric exam: Normal Affect, Normal Mood





Assessment and Plan





- Assessment and Plan (Free Text)


Plan: 





61 y/o F with PMHx of HTN, DM type 2, Stomach Ulcer, Gastritis, Asthma admitted 

with asthma exacerbation. 





Plan: Pt wheezing slightly improved; However, continues to experience night 

time symptoms and throughout the day. Continue with increased dose of steroid. 





Asthma Exacerbation


-Severe persistent Asthma


-MedSurg unit


-most likely 2/2 viral upper respiratory infection


-Duoneb neb every 3 hours kervin


-Methylprednisolone 60 mg IV BID


-advair diskus inh Q12


-mucinex DM 


-chest physiotherapy TID


-bcx: negative for 24 hrs


-peak flow monitoring


-monitor v/s





Hypertension


-c/w home meds


-monitor bp





Diabetes Mellitus type 2


-c/w home metformin 


-accucheck before breakfast


-hypoglycemic protocol with SS


-diabetic diet





DVT prophylaxis


-lovenox

## 2018-04-16 LAB
ALBUMIN SERPL-MCNC: 4.1 G/DL (ref 3.5–5)
ALBUMIN/GLOB SERPL: 1.2 {RATIO} (ref 1–2.1)
ALT SERPL-CCNC: 41 U/L (ref 9–52)
AST SERPL-CCNC: 18 U/L (ref 14–36)
BUN SERPL-MCNC: 27 MG/DL (ref 7–17)
CALCIUM SERPL-MCNC: 9.9 MG/DL (ref 8.4–10.2)
ERYTHROCYTE [DISTWIDTH] IN BLOOD BY AUTOMATED COUNT: 13.3 % (ref 11.5–14.5)
GFR NON-AFRICAN AMERICAN: > 60
HGB BLD-MCNC: 15.9 G/DL (ref 12–16)
MCH RBC QN AUTO: 30.2 PG (ref 27–31)
MCHC RBC AUTO-ENTMCNC: 33.7 G/DL (ref 33–37)
MCV RBC AUTO: 89.7 FL (ref 81–99)
PLATELET # BLD: 335 K/UL (ref 130–400)
RBC # BLD AUTO: 5.27 MIL/UL (ref 3.8–5.2)
WBC # BLD AUTO: 12.7 K/UL (ref 4.8–10.8)

## 2018-04-16 RX ADMIN — IPRATROPIUM BROMIDE AND ALBUTEROL SULFATE SCH ML: .5; 3 SOLUTION RESPIRATORY (INHALATION) at 19:56

## 2018-04-16 RX ADMIN — ENOXAPARIN SODIUM SCH MG: 40 INJECTION SUBCUTANEOUS at 08:40

## 2018-04-16 RX ADMIN — IPRATROPIUM BROMIDE AND ALBUTEROL SULFATE SCH ML: .5; 3 SOLUTION RESPIRATORY (INHALATION) at 07:18

## 2018-04-16 RX ADMIN — IPRATROPIUM BROMIDE AND ALBUTEROL SULFATE SCH ML: .5; 3 SOLUTION RESPIRATORY (INHALATION) at 11:46

## 2018-04-16 RX ADMIN — CALCIUM CARBONATE-VITAMIN D TAB 500 MG-200 UNIT SCH TAB: 500-200 TAB at 17:20

## 2018-04-16 RX ADMIN — IPRATROPIUM BROMIDE AND ALBUTEROL SULFATE SCH ML: .5; 3 SOLUTION RESPIRATORY (INHALATION) at 02:28

## 2018-04-16 RX ADMIN — GUAIFENESIN AND DEXTROMETHORPHAN HYDROBROMIDE SCH TAB: 600; 30 TABLET, EXTENDED RELEASE ORAL at 16:53

## 2018-04-16 RX ADMIN — GUAIFENESIN AND DEXTROMETHORPHAN HYDROBROMIDE SCH TAB: 600; 30 TABLET, EXTENDED RELEASE ORAL at 08:40

## 2018-04-16 RX ADMIN — IPRATROPIUM BROMIDE AND ALBUTEROL SULFATE SCH ML: .5; 3 SOLUTION RESPIRATORY (INHALATION) at 17:12

## 2018-04-16 RX ADMIN — IPRATROPIUM BROMIDE AND ALBUTEROL SULFATE SCH ML: .5; 3 SOLUTION RESPIRATORY (INHALATION) at 23:15

## 2018-04-16 RX ADMIN — PANTOPRAZOLE SODIUM SCH MG: 40 TABLET, DELAYED RELEASE ORAL at 08:40

## 2018-04-16 RX ADMIN — INSULIN DETEMIR SCH UNITS: 100 INJECTION, SOLUTION SUBCUTANEOUS at 21:56

## 2018-04-16 RX ADMIN — FLUTICASONE PROPIONATE AND SALMETEROL SCH PUFF: 50; 500 POWDER RESPIRATORY (INHALATION) at 21:57

## 2018-04-16 RX ADMIN — IPRATROPIUM BROMIDE AND ALBUTEROL SULFATE SCH ML: .5; 3 SOLUTION RESPIRATORY (INHALATION) at 14:10

## 2018-04-16 RX ADMIN — FLUTICASONE PROPIONATE AND SALMETEROL SCH PUFF: 50; 250 POWDER RESPIRATORY (INHALATION) at 08:38

## 2018-04-16 RX ADMIN — CALCIUM CARBONATE-VITAMIN D TAB 500 MG-200 UNIT SCH TAB: 500-200 TAB at 08:40

## 2018-04-16 RX ADMIN — IPRATROPIUM BROMIDE AND ALBUTEROL SULFATE SCH ML: .5; 3 SOLUTION RESPIRATORY (INHALATION) at 04:54

## 2018-04-16 NOTE — CP.PCM.PN
Subjective





- Date & Time of Evaluation


Date of Evaluation: 04/16/18


Time of Evaluation: 08:10





- Subjective


Subjective: 





Pt seen and examined at bedside. Denies significant overnight events. Reports 

improved symptoms. Nausea resolved. Denies vomiting. Tolerating PO diet. 





Objective





- Vital Signs/Intake and Output


Vital Signs (last 24 hours): 


 











Temp Pulse Resp BP Pulse Ox


 


 97.6 F   64   19   105/61   95 


 


 04/16/18 00:00  04/16/18 00:00  04/16/18 00:00  04/16/18 00:00  04/16/18 00:00











- Medications


Medications: 


 Current Medications





Acetaminophen (Tylenol 325mg Tab)  650 mg PO Q6 PRN


   PRN Reason: Pain, Mild (1-3)


Albuterol/Ipratropium (Duoneb 3 Mg/0.5 Mg (3 Ml) Ud)  3 ml INH RQ3 [facility]


   Last Admin: 04/16/18 07:18 Dose:  3 ml


Aspirin (Aspirin Chewable)  81 mg PO HS [facility]


   Last Admin: 04/15/18 21:08 Dose:  81 mg


Atorvastatin Calcium (Lipitor)  40 mg PO DAILY [facility]


   Last Admin: 04/15/18 08:10 Dose:  40 mg


Calcium/Vitamin D (Oyster Shell Calcium/Vitamin D 500 Mg-200 Iu)  1 tab PO BID 

[facility]


   Last Admin: 04/15/18 16:16 Dose:  1 tab


Dextrose (Dextrose 50% Inj)  0 ml IV STAT PRN; Protocol


   PRN Reason: Hypoglycemia Protocol


Dextrose (Glutose 15)  0 gm PO ONCE PRN; Protocol


   PRN Reason: Hypoglycemia Protocol


Docusate Sodium (Colace)  200 mg PO DAILY [facility]


Enoxaparin Sodium (Lovenox)  40 mg SC DAILY [facility]


   PRN Reason: Protocol


   Last Admin: 04/15/18 08:10 Dose:  40 mg


Escitalopram Oxalate (Lexapro)  20 mg PO DAILY [facility]


   Last Admin: 04/15/18 08:10 Dose:  20 mg


Glucagon (Glucagen Diagnostic Kit)  0 mg IM STAT PRN; Protocol


   PRN Reason: Hypoglycemia Protocol


Guaifenesin/Dextromethorphan (Mucinex-Dm 600-30 Mg)  1 tab PO BID [facility]


   Last Admin: 04/15/18 16:13 Dose:  1 tab


Hydrochlorothiazide (Hydrodiuril)  25 mg PO DAILY [facility]


   Last Admin: 04/15/18 08:10 Dose:  25 mg


Insulin Human Regular (Humulin R)  0 units SC [facility]


   PRN Reason: Protocol


   Last Admin: 04/15/18 22:03 Dose:  2 units


Lisinopril (Zestril)  40 mg PO DAILY [facility]


   Last Admin: 04/15/18 08:11 Dose:  40 mg


Metformin HCl (Glucophage)  1,000 mg PO BID [facility]


   Last Admin: 04/15/18 16:13 Dose:  1,000 mg


Methylprednisolone (Solu-Medrol)  60 mg IVP BID [facility]


   Last Admin: 04/15/18 16:14 Dose:  60 mg


Montelukast Sodium (Singulair)  10 mg PO HS [facility]


   Last Admin: 04/15/18 21:08 Dose:  10 mg


Ondansetron HCl (Zofran Tab)  4 mg PO Q6 PRN


   PRN Reason: Nausea/Vomiting


   Last Admin: 04/13/18 15:27 Dose:  4 mg


Pantoprazole Sodium (Protonix Ec Tab)  40 mg PO DAILY [facility]


   Last Admin: 04/15/18 08:10 Dose:  40 mg


Promethazine HCl/Codeine (Phenergan/Codeine Oral Syrup)  5 ml PO Q6 PRN


   PRN Reason: Cough


Fluticasone/Salmeterol (Advair Diskus 250/50)  1 puff IH Q12 [facility]


   Last Admin: 04/15/18 20:36 Dose:  1 puff











- Labs


Labs: 


 





 04/16/18 05:45 





 04/16/18 05:45 





 











PT  12.3 Seconds (9.8-13.1)   04/12/18  22:00    


 


INR  1.1  (0.9-1.2)   04/12/18  22:00    


 


APTT  45.8 Seconds (25.6-37.1)  H  04/12/18  22:00    














- Constitutional


Appears: Well





- Eye Exam


Eye Exam: EOMI





- Neck Exam


Neck Exam: Full ROM





- Respiratory Exam


Respiratory Exam: Wheezes





- Cardiovascular Exam


Cardiovascular Exam: REGULAR RHYTHM, +S1, +S2





- GI/Abdominal Exam


GI & Abdominal Exam: Soft, Normal Bowel Sounds.  absent: Tenderness





- Neurological Exam


Neurological Exam: Alert, Awake, CN II-XII Intact, Oriented x3





- Psychiatric Exam


Psychiatric exam: Normal Affect, Normal Mood





Assessment and Plan





- Assessment and Plan (Free Text)


Plan: 





63 y/o F with PMHx of HTN, DM type 2, Stomach Ulcer, Gastritis, Asthma admitted 

with asthma exacerbation. 





Plan: wheezing improved; However, continues to experience night time symptoms 

and throughout the day. 





Asthma Exacerbation


-Severe persistent Asthma


-MedSurg unit


-most likely 2/2 viral upper respiratory infection


-Duoneb neb every 3 hours kervin


-Methylprednisolone 60 mg IV qD; tapered from BID


-advair diskus inh Q12


-mucinex DM 


-chest physiotherapy TID


-bcx: negative for 3D


-peak flow monitoring


-pulmonology consulted: Dr. Monk; recommendations appreciated


-monitor v/s and oral thrush





Hypertension


-decreased HCTZ to 12.5 q D


-Continue Lisinopril 40 mg po daily


-monitor bp





Diabetes Mellitus type 2


-c/w home metformin 


-added Levemir 10u qHS


-accucheck before breakfast


-hypoglycemic protocol with SS


-diabetic diet





DVT prophylaxis


-lovenox

## 2018-04-16 NOTE — CP.PCM.CON
History of Present Illness





- History of Present Illness


History of Present Illness: 





Pulmonary consult for a 63 y/o F,  Multiple admissions for  Asthma  

exacerbation , admitted to [facility] on 04/12/18 due to acute SOB from 4 

days PTA, Pt was using Nebulizer Tx with no relief, also Pt states that she ran 

out of her Dulera.





Pt was c/o of moderate SOB, gradually increased on DOA, associated to chest 

congestion, wheeziness, cough with productive greenish sputum, at times unable 

to expel the phlegms.


Worsening symptoms:  DUGGAN, increased chest tightness when coughing, low grade 

fever while at home (as per Pt), hyperglycemia.





Aggravated factor:  Exercise.





Pt denied:  Chills, n/v/d, abdominal pain, CP, palpitations, dizziness, syncope

, legs swelling, back pain, urinary symptoms, sick contact, recent travel out 

of [place].


CXR shows:  No cardiopulmonary disease, no infiltrates, no pleural effusion.











Review of Systems





- Constitutional


Constitutional: Fever (low grade while at home.)





- EENT


Eyes: Requires Corrective Lenses


Ears: Other (negative)





- Cardiovascular


Cardiovascular: Other (negative)





- Respiratory


Respiratory: Cough, Dyspnea, Dyspnea on Exertion, Wheezing, Chest Congestion, 

Change in Mucous Color, Pain with Coughing





- Gastrointestinal


Gastrointestinal: Other (negative)





- Genitourinary


Genitourinary: Other (negative)





- Musculoskeletal


Musculoskeletal: Other (negative)





- Integumentary


Integumentary: Other (negative)





- Neurological


Neurological: Other (negative)





- Psychiatric


Psychiatric: Depression





- Endocrine


Endocrine: Other (negative)





- Hematologic/Lymphatic


Hematologic: Other (negative)





Past Patient History





- Infectious Disease


Hx of Infectious Diseases: None





- Past Medical History & Family History


Past Medical History?: Yes


Pertinent Family History: 





Father passed from Hepatitis.  Mother passed from Breast Ca.





- Past Social History


Alcohol: None


Drugs: Denies


Home Situation {Lives}: With Family





- CARDIAC


Hx Cardiac Disorders: Yes


Hx Hypercholesterolemia: Yes


Hx Hypertension: Yes





- PULMONARY


Hx Respiratory Disorders: Yes


Hx Asthma: Yes


Hx Bronchitis: Yes





- NEUROLOGICAL


Hx Neurological Disorder: No





- HEENT


Hx HEENT Problems: No





- RENAL


Hx Chronic Kidney Disease: No





- ENDOCRINE/METABOLIC


Hx Endocrine Disorders: Yes


Hx Diabetes Mellitus Type 2: Yes





- HEMATOLOGICAL/ONCOLOGICAL


Hx Blood Disorders: No


Hx Human Immunodeficiency Virus (HIV): No





- INTEGUMENTARY


Hx Dermatological Problems: No





- MUSCULOSKELETAL/RHEUMATOLOGICAL


Hx Musculoskeletal Disorders: No


Hx Falls: No





- GASTROINTESTINAL


Hx Gastrointestinal Disorders: Yes


Hx Diverticulitis: Yes


Hx Gastritis: Yes





- GENITOURINARY/GYNECOLOGICAL


Hx Genitourinary Disorders: No





- PSYCHIATRIC


Hx Psychophysiologic Disorder: Yes


Hx Anxiety: Yes


Hx Bipolar Disorder: Yes


Hx Depression: Yes





- SURGICAL HISTORY


Hx Surgeries: Yes


Hx Cardiac Catheterization: Yes


Hx Hysterectomy: Yes


Other/Comment: left lipoma. oophorectomy





- ANESTHESIA


Hx Anesthesia: Yes


Hx Anesthesia Reactions: No


Hx Malignant Hyperthermia: No





Meds


Allergies/Adverse Reactions: 


 Allergies











Allergy/AdvReac Type Severity Reaction Status Date / Time


 


iodine Allergy  RASH Verified 11/21/17 20:31














- Medications


Medications: 


 Current Medications





Acetaminophen (Tylenol 325mg Tab)  650 mg PO Q6 PRN


   PRN Reason: Pain, Mild (1-3)


Albuterol/Ipratropium (Duoneb 3 Mg/0.5 Mg (3 Ml) Ud)  3 ml INH RQ3 [facility]


   Last Admin: 04/16/18 14:10 Dose:  3 ml


Aspirin (Aspirin Chewable)  81 mg PO HS [facility]


   Last Admin: 04/15/18 21:08 Dose:  81 mg


Atorvastatin Calcium (Lipitor)  40 mg PO DAILY [facility]


   Last Admin: 04/16/18 08:40 Dose:  40 mg


Calcium/Vitamin D (Oyster Shell Calcium/Vitamin D 500 Mg-200 Iu)  1 tab PO BID 

[facility]


   Last Admin: 04/16/18 08:40 Dose:  1 tab


Dextrose (Dextrose 50% Inj)  0 ml IV STAT PRN; Protocol


   PRN Reason: Hypoglycemia Protocol


Dextrose (Glutose 15)  0 gm PO ONCE PRN; Protocol


   PRN Reason: Hypoglycemia Protocol


Docusate Sodium (Colace)  200 mg PO DAILY [facility]


   Last Admin: 04/16/18 11:20 Dose:  200 mg


Enoxaparin Sodium (Lovenox)  40 mg SC DAILY [facility]


   PRN Reason: Protocol


   Last Admin: 04/16/18 08:40 Dose:  40 mg


Escitalopram Oxalate (Lexapro)  20 mg PO DAILY [facility]


   Last Admin: 04/16/18 08:39 Dose:  20 mg


Glucagon (Glucagen Diagnostic Kit)  0 mg IM STAT PRN; Protocol


   PRN Reason: Hypoglycemia Protocol


Guaifenesin/Dextromethorphan (Mucinex-Dm 600-30 Mg)  1 tab PO BID [facility]


   Last Admin: 04/16/18 08:40 Dose:  1 tab


Hydrochlorothiazide (Microzide)  12.5 mg PO DAILY [facility]


Insulin Detemir (Levemir)  10 units SC [facility]


Insulin Human Regular (Humulin R)  0 units SC [facility]


   PRN Reason: Protocol


   Last Admin: 04/16/18 12:56 Dose:  6 units


Lisinopril (Zestril)  40 mg PO DAILY [facility]


   Last Admin: 04/16/18 08:41 Dose:  40 mg


Metformin HCl (Glucophage)  1,000 mg PO BID [facility]


   Last Admin: 04/16/18 08:39 Dose:  1,000 mg


Methylprednisolone (Solu-Medrol)  60 mg IVP DAILY [facility]


   Last Admin: 04/16/18 11:18 Dose:  Not Given


Montelukast Sodium (Singulair)  10 mg PO HS [facility]


   Last Admin: 04/15/18 21:08 Dose:  10 mg


Ondansetron HCl (Zofran Tab)  4 mg PO Q6 PRN


   PRN Reason: Nausea/Vomiting


   Last Admin: 04/13/18 15:27 Dose:  4 mg


Pantoprazole Sodium (Protonix Ec Tab)  40 mg PO DAILY [facility]


   Last Admin: 04/16/18 08:40 Dose:  40 mg


Promethazine HCl/Codeine (Phenergan/Codeine Oral Syrup)  5 ml PO Q6 PRN


   PRN Reason: Cough


Fluticasone/Salmeterol (Advair Diskus 250/50)  1 puff IH Q12 [facility]


   Last Admin: 04/16/18 08:38 Dose:  1 puff











Physical Exam





- Constitutional


Appears: No Acute Distress





- Head Exam


Head Exam: NORMAL INSPECTION





- Eye Exam


Eye Exam: PERRL





- ENT Exam


ENT Exam: Mucous Membranes Moist





- Neck Exam


Neck exam: Positive for: Normal Inspection





- Respiratory Exam


Respiratory Exam: Decreased Breath Sounds (b/l), Wheezes (few expiratory)





- Cardiovascular Exam


Cardiovascular Exam: REGULAR RHYTHM





- GI/Abdominal Exam


GI & Abdominal Exam: Normal Bowel Sounds, Soft





- Extremities Exam


Extremities exam: Positive for: normal inspection





- Back Exam


Back exam: NORMAL INSPECTION





- Neurological Exam


Neurological exam: Alert, Oriented x3





- Skin


Skin Exam: Warm





Results





- Vital Signs


Recent Vital Signs: 


 Last Vital Signs











Temp  97.6 F   04/16/18 09:00


 


Pulse  77   04/16/18 09:00


 


Resp  20   04/16/18 09:00


 


BP  124/70   04/16/18 09:00


 


Pulse Ox  93 L  04/16/18 09:00








reviewed J.P.





- Labs


Result Diagrams: 


 04/16/18 05:45





 04/16/18 05:45


Labs: 


 Laboratory Results - last 24 hr











  04/15/18 04/15/18 04/16/18





  15:41 21:56 02:32


 


WBC   


 


RBC   


 


Hgb   


 


Hct   


 


MCV   


 


MCH   


 


MCHC   


 


RDW   


 


Plt Count   


 


Sodium   


 


Potassium   


 


Chloride   


 


Carbon Dioxide   


 


Anion Gap   


 


BUN   


 


Creatinine   


 


Est GFR ( Amer)   


 


Est GFR (Non-Af Amer)   


 


POC Glucose (mg/dL)  315 H  311 H  328 H


 


Random Glucose   


 


Calcium   


 


Total Bilirubin   


 


AST   


 


ALT   


 


Alkaline Phosphatase   


 


Total Protein   


 


Albumin   


 


Globulin   


 


Albumin/Globulin Ratio   














  04/16/18 04/16/18 04/16/18





  05:33 05:45 05:45


 


WBC   12.7 H 


 


RBC   5.27 H 


 


Hgb   15.9 


 


Hct   47.2 H 


 


MCV   89.7 


 


MCH   30.2 


 


MCHC   33.7 


 


RDW   13.3 


 


Plt Count   335 


 


Sodium    138


 


Potassium    3.7


 


Chloride    93 L


 


Carbon Dioxide    29


 


Anion Gap    20


 


BUN    27 H


 


Creatinine    0.7


 


Est GFR ( Amer)    > 60


 


Est GFR (Non-Af Amer)    > 60


 


POC Glucose (mg/dL)  277 H  


 


Random Glucose    322 H


 


Calcium    9.9


 


Total Bilirubin    0.8


 


AST    18


 


ALT    41


 


Alkaline Phosphatase    102


 


Total Protein    7.6


 


Albumin    4.1


 


Globulin    3.5


 


Albumin/Globulin Ratio    1.2














  04/16/18





  11:14


 


WBC 


 


RBC 


 


Hgb 


 


Hct 


 


MCV 


 


MCH 


 


MCHC 


 


RDW 


 


Plt Count 


 


Sodium 


 


Potassium 


 


Chloride 


 


Carbon Dioxide 


 


Anion Gap 


 


BUN 


 


Creatinine 


 


Est GFR ( Amer) 


 


Est GFR (Non-Af Amer) 


 


POC Glucose (mg/dL)  274 H


 


Random Glucose 


 


Calcium 


 


Total Bilirubin 


 


AST 


 


ALT 


 


Alkaline Phosphatase 


 


Total Protein 


 


Albumin 


 


Globulin 


 


Albumin/Globulin Ratio 








reviewed J.P.





- EKG Data


EKG comments: 





reviewed J.P.





- Imaging and Cardiology


  ** Chest x-ray


Status: Report reviewed by me





Assessment & Plan


(1) Asthma with severe exacerbation


Status: Acute   Priority: High   





- Assessment and Plan (Free Text)


Plan: 


 Prednisone in tapering doses, Advair 50/500 ,  continue Duoneb , Singulair , 

Mucinex , Prometh with Codeine and rest of Tx.








- Date & Time


Date: 04/16/18


Time: 13:00

## 2018-04-17 LAB
BUN SERPL-MCNC: 32 MG/DL (ref 7–17)
CALCIUM SERPL-MCNC: 9.6 MG/DL (ref 8.4–10.2)
ERYTHROCYTE [DISTWIDTH] IN BLOOD BY AUTOMATED COUNT: 13.2 % (ref 11.5–14.5)
GFR NON-AFRICAN AMERICAN: > 60
HGB BLD-MCNC: 15.3 G/DL (ref 12–16)
MCH RBC QN AUTO: 29.9 PG (ref 27–31)
MCHC RBC AUTO-ENTMCNC: 33.1 G/DL (ref 33–37)
MCV RBC AUTO: 90.2 FL (ref 81–99)
PLATELET # BLD: 298 K/UL (ref 130–400)
RBC # BLD AUTO: 5.13 MIL/UL (ref 3.8–5.2)
WBC # BLD AUTO: 11.8 K/UL (ref 4.8–10.8)

## 2018-04-17 RX ADMIN — CALCIUM CARBONATE-VITAMIN D TAB 500 MG-200 UNIT SCH TAB: 500-200 TAB at 08:35

## 2018-04-17 RX ADMIN — IPRATROPIUM BROMIDE AND ALBUTEROL SULFATE SCH ML: .5; 3 SOLUTION RESPIRATORY (INHALATION) at 11:59

## 2018-04-17 RX ADMIN — IPRATROPIUM BROMIDE AND ALBUTEROL SULFATE SCH ML: .5; 3 SOLUTION RESPIRATORY (INHALATION) at 16:04

## 2018-04-17 RX ADMIN — METHYLPREDNISOLONE SODIUM SUCCINATE SCH MG: 40 INJECTION, POWDER, FOR SOLUTION INTRAMUSCULAR; INTRAVENOUS at 18:07

## 2018-04-17 RX ADMIN — IPRATROPIUM BROMIDE AND ALBUTEROL SULFATE SCH ML: .5; 3 SOLUTION RESPIRATORY (INHALATION) at 19:08

## 2018-04-17 RX ADMIN — IPRATROPIUM BROMIDE AND ALBUTEROL SULFATE SCH ML: .5; 3 SOLUTION RESPIRATORY (INHALATION) at 05:43

## 2018-04-17 RX ADMIN — GUAIFENESIN AND DEXTROMETHORPHAN HYDROBROMIDE SCH TAB: 600; 30 TABLET, EXTENDED RELEASE ORAL at 08:35

## 2018-04-17 RX ADMIN — CALCIUM CARBONATE-VITAMIN D TAB 500 MG-200 UNIT SCH TAB: 500-200 TAB at 17:50

## 2018-04-17 RX ADMIN — ALUMINUM HYDROXIDE, MAGNESIUM HYDROXIDE, AND SIMETHICONE SCH ML: 200; 200; 20 SUSPENSION ORAL at 08:33

## 2018-04-17 RX ADMIN — PROMETHAZINE HYDROCHLORIDE AND CODEINE PHOSPHATE PRN ML: 6.25; 1 SOLUTION ORAL at 13:27

## 2018-04-17 RX ADMIN — IPRATROPIUM BROMIDE AND ALBUTEROL SULFATE SCH ML: .5; 3 SOLUTION RESPIRATORY (INHALATION) at 07:58

## 2018-04-17 RX ADMIN — PROMETHAZINE HYDROCHLORIDE AND CODEINE PHOSPHATE SCH ML: 6.25; 1 SOLUTION ORAL at 17:56

## 2018-04-17 RX ADMIN — PROMETHAZINE HYDROCHLORIDE AND CODEINE PHOSPHATE SCH ML: 6.25; 1 SOLUTION ORAL at 21:57

## 2018-04-17 RX ADMIN — ENOXAPARIN SODIUM SCH MG: 40 INJECTION SUBCUTANEOUS at 08:35

## 2018-04-17 RX ADMIN — FLUTICASONE PROPIONATE AND SALMETEROL SCH PUFF: 50; 500 POWDER RESPIRATORY (INHALATION) at 21:55

## 2018-04-17 RX ADMIN — IPRATROPIUM BROMIDE AND ALBUTEROL SULFATE SCH ML: .5; 3 SOLUTION RESPIRATORY (INHALATION) at 02:19

## 2018-04-17 RX ADMIN — IPRATROPIUM BROMIDE AND ALBUTEROL SULFATE SCH ML: .5; 3 SOLUTION RESPIRATORY (INHALATION) at 23:23

## 2018-04-17 RX ADMIN — PANTOPRAZOLE SODIUM SCH MG: 40 TABLET, DELAYED RELEASE ORAL at 08:35

## 2018-04-17 RX ADMIN — FLUTICASONE PROPIONATE AND SALMETEROL SCH PUFF: 50; 500 POWDER RESPIRATORY (INHALATION) at 08:33

## 2018-04-17 RX ADMIN — INSULIN DETEMIR SCH UNITS: 100 INJECTION, SOLUTION SUBCUTANEOUS at 21:54

## 2018-04-17 NOTE — CP.PCM.PN
Subjective





- Date & Time of Evaluation


Date of Evaluation: 04/17/18


Time of Evaluation: 11:30





- Subjective


Subjective: 





F/U  Asthma Exacerbation


Breathing  better , no SOB , no DUGGAN , occasional dry cough





Objective





- Vital Signs/Intake and Output


Vital Signs (last 24 hours): 


 











Temp Pulse Resp BP Pulse Ox


 


 98.3 F   75   20   97/62 L  95 


 


 04/17/18 15:51  04/17/18 15:51  04/17/18 15:51  04/17/18 15:51  04/17/18 15:51











- Medications


Medications: 


 Current Medications





Acetaminophen (Tylenol 325mg Tab)  650 mg PO Q6 PRN


   PRN Reason: Pain, Mild (1-3)


Al Hydrox/Mg Hydrox/Simethicone (Maalox Plus 30 Ml)  30 ml PO DAILY [facility]


   Last Admin: 04/17/18 08:33 Dose:  30 ml


Albuterol/Ipratropium (Duoneb 3 Mg/0.5 Mg (3 Ml) Ud)  3 ml INH RQ3 [facility]


   Last Admin: 04/17/18 16:04 Dose:  3 ml


Aspirin (Aspirin Chewable)  81 mg PO HS [facility]


   Last Admin: 04/16/18 21:57 Dose:  81 mg


Atorvastatin Calcium (Lipitor)  40 mg PO DAILY [facility]


   Last Admin: 04/17/18 08:35 Dose:  40 mg


Calcium/Vitamin D (Oyster Shell Calcium/Vitamin D 500 Mg-200 Iu)  1 tab PO BID 

[facility]


   Last Admin: 04/17/18 08:35 Dose:  1 tab


Dextrose (Dextrose 50% Inj)  0 ml IV STAT PRN; Protocol


   PRN Reason: Hypoglycemia Protocol


Dextrose (Glutose 15)  0 gm PO ONCE PRN; Protocol


   PRN Reason: Hypoglycemia Protocol


Docusate Sodium (Colace)  200 mg PO DAILY [facility]


   Last Admin: 04/17/18 08:33 Dose:  200 mg


Enoxaparin Sodium (Lovenox)  40 mg SC DAILY [facility]


   PRN Reason: Protocol


   Last Admin: 04/17/18 08:35 Dose:  40 mg


Escitalopram Oxalate (Lexapro)  20 mg PO DAILY [facility]


   Last Admin: 04/17/18 08:35 Dose:  20 mg


Glucagon (Glucagen Diagnostic Kit)  0 mg IM STAT PRN; Protocol


   PRN Reason: Hypoglycemia Protocol


Hydrochlorothiazide (Microzide)  12.5 mg PO DAILY [facility]


Insulin Detemir (Levemir)  10 units SC [facility]


   Last Admin: 04/16/18 21:56 Dose:  10 units


Insulin Human Regular (Humulin R)  0 units SC ACHS [facility]


   PRN Reason: Protocol


   Last Admin: 04/17/18 13:27 Dose:  8 units


Losartan Potassium (Cozaar)  25 mg PO DAILY [facility]


Metformin HCl (Glucophage)  1,000 mg PO BID [facility]


   Last Admin: 04/17/18 08:34 Dose:  1,000 mg


Methylprednisolone (Solu-Medrol)  40 mg IVP BID [facility]


Montelukast Sodium (Singulair)  10 mg PO HS [facility]


   Last Admin: 04/16/18 21:57 Dose:  10 mg


Ondansetron HCl (Zofran Tab)  4 mg PO Q6 PRN


   PRN Reason: Nausea/Vomiting


   Last Admin: 04/13/18 15:27 Dose:  4 mg


Pantoprazole Sodium (Protonix Ec Tab)  40 mg PO DAILY [facility]


   Last Admin: 04/17/18 08:35 Dose:  40 mg


Promethazine HCl/Codeine (Phenergan/Codeine Oral Syrup)  5 ml PO Q6 PRN


   PRN Reason: Cough


   Last Admin: 04/17/18 13:27 Dose:  5 ml


Promethazine HCl/Codeine (Phenergan/Codeine Oral Syrup)  5 ml PO Q6 [facility]


Fluticasone/Salmeterol (Advair Diskus 500/50)  1 puff IH Q12 [facility]


   Last Admin: 04/17/18 08:33 Dose:  1 puff











- Labs


Labs: 


 





 04/17/18 05:45 





 04/17/18 05:45 





 











PT  12.3 Seconds (9.8-13.1)   04/12/18  22:00    


 


INR  1.1  (0.9-1.2)   04/12/18  22:00    


 


APTT  45.8 Seconds (25.6-37.1)  H  04/12/18  22:00    














- Constitutional


Appears: No Acute Distress





- Head Exam


Head Exam: NORMAL INSPECTION





- Eye Exam


Eye Exam: PERRL





- ENT Exam


ENT Exam: Normal Exam





- Neck Exam


Neck Exam: Normal Inspection





- Respiratory Exam


Respiratory Exam: Decreased Breath Sounds (b/l), Wheezes (few  at  bases)





- Cardiovascular Exam


Cardiovascular Exam: REGULAR RHYTHM





- GI/Abdominal Exam


GI & Abdominal Exam: Soft, Normal Bowel Sounds





- Extremities Exam


Extremities Exam: Normal Inspection





- Back Exam


Back Exam: NORMAL INSPECTION





- Neurological Exam


Neurological Exam: Alert, Oriented x3





- Psychiatric Exam


Psychiatric exam: Depressed





- Skin


Skin Exam: Warm





Assessment and Plan


(1) Asthma with severe exacerbation


Status: Acute   





- Assessment and Plan (Free Text)


Plan: 





Improved , stable  to be  discharged , treatment  and  f/u discussed  with 

attendant.

## 2018-04-17 NOTE — CARD
--------------- APPROVED REPORT --------------





EKG Measurement

Heart [id number]

MI [id number]

[id number]

[id number]

[id number]



<Conclusion>

Sinus rhythm with short MI

Possible Left atrial enlargement

Borderline ECG

## 2018-04-17 NOTE — CP.PCM.PN
Subjective





- Date & Time of Evaluation


Date of Evaluation: 04/17/18


Time of Evaluation: 08:35





- Subjective


Subjective: 





Pt seen and examined at bedside this am. Reports bm this am. Denies significant 

overnight events. Reports slight improvement in SOB but still experiencing dry, 

non productive cough. Peak Flow is at 230. Denies N/V. 





Objective





- Vital Signs/Intake and Output


Vital Signs (last 24 hours): 


 











Temp Pulse Resp BP Pulse Ox


 


 97.7 F   62   18   118/63   94 L


 


 04/17/18 08:06  04/17/18 08:36  04/17/18 08:06  04/17/18 08:36  04/17/18 08:06











- Medications


Medications: 


 Current Medications





Acetaminophen (Tylenol 325mg Tab)  650 mg PO Q6 PRN


   PRN Reason: Pain, Mild (1-3)


Al Hydrox/Mg Hydrox/Simethicone (Maalox Plus 30 Ml)  30 ml PO DAILY Count includes the [facility]


   Last Admin: 04/17/18 08:33 Dose:  30 ml


Albuterol/Ipratropium (Duoneb 3 Mg/0.5 Mg (3 Ml) Ud)  3 ml INH RQ3 KERVIN


   Last Admin: 04/17/18 07:58 Dose:  3 ml


Aspirin (Aspirin Chewable)  81 mg PO HS Count includes the [facility]


   Last Admin: 04/16/18 21:57 Dose:  81 mg


Atorvastatin Calcium (Lipitor)  40 mg PO DAILY Count includes the [facility]


   Last Admin: 04/17/18 08:35 Dose:  40 mg


Calcium/Vitamin D (Oyster Shell Calcium/Vitamin D 500 Mg-200 Iu)  1 tab PO BID 

KERVIN


   Last Admin: 04/17/18 08:35 Dose:  1 tab


Dextrose (Dextrose 50% Inj)  0 ml IV STAT PRN; Protocol


   PRN Reason: Hypoglycemia Protocol


Dextrose (Glutose 15)  0 gm PO ONCE PRN; Protocol


   PRN Reason: Hypoglycemia Protocol


Docusate Sodium (Colace)  200 mg PO DAILY Count includes the [facility]


   Last Admin: 04/17/18 08:33 Dose:  200 mg


Enoxaparin Sodium (Lovenox)  40 mg SC DAILY KERVIN


   PRN Reason: Protocol


   Last Admin: 04/17/18 08:35 Dose:  40 mg


Escitalopram Oxalate (Lexapro)  20 mg PO DAILY Count includes the [facility]


   Last Admin: 04/17/18 08:35 Dose:  20 mg


Glucagon (Glucagen Diagnostic Kit)  0 mg IM STAT PRN; Protocol


   PRN Reason: Hypoglycemia Protocol


Guaifenesin/Dextromethorphan (Mucinex-Dm 600-30 Mg)  1 tab PO BID Count includes the [facility]


   Last Admin: 04/17/18 08:35 Dose:  1 tab


Hydrochlorothiazide (Microzide)  12.5 mg PO DAILY Count includes the [facility]


   Last Admin: 04/17/18 08:35 Dose:  12.5 mg


Insulin Detemir (Levemir)  10 units SC [facility]


   Last Admin: 04/16/18 21:56 Dose:  10 units


Insulin Human Regular (Humulin R)  0 units SC ACHS Count includes the [facility]


   PRN Reason: Protocol


   Last Admin: 04/17/18 08:34 Dose:  2 units


Lisinopril (Zestril)  40 mg PO DAILY Count includes the [facility]


   Last Admin: 04/17/18 08:36 Dose:  40 mg


Metformin HCl (Glucophage)  1,000 mg PO BID Count includes the [facility]


   Last Admin: 04/17/18 08:34 Dose:  1,000 mg


Methylprednisolone (Solu-Medrol)  40 mg IVP DAILY Count includes the [facility]


   Last Admin: 04/17/18 08:36 Dose:  40 mg


Montelukast Sodium (Singulair)  10 mg PO HS Count includes the [facility]


   Last Admin: 04/16/18 21:57 Dose:  10 mg


Ondansetron HCl (Zofran Tab)  4 mg PO Q6 PRN


   PRN Reason: Nausea/Vomiting


   Last Admin: 04/13/18 15:27 Dose:  4 mg


Pantoprazole Sodium (Protonix Ec Tab)  40 mg PO DAILY Count includes the [facility]


   Last Admin: 04/17/18 08:35 Dose:  40 mg


Promethazine HCl/Codeine (Phenergan/Codeine Oral Syrup)  5 ml PO Q6 PRN


   PRN Reason: Cough


Fluticasone/Salmeterol (Advair Diskus 500/50)  1 puff IH Q12 Count includes the [facility]


   Last Admin: 04/17/18 08:33 Dose:  1 puff











- Labs


Labs: 


 





 04/17/18 05:45 





 04/17/18 05:45 





 











PT  12.3 Seconds (9.8-13.1)   04/12/18  22:00    


 


INR  1.1  (0.9-1.2)   04/12/18  22:00    


 


APTT  45.8 Seconds (25.6-37.1)  H  04/12/18  22:00    














- Constitutional


Appears: Well, No Acute Distress





- Eye Exam


Eye Exam: EOMI





- ENT Exam


ENT Exam: Mucous Membranes Dry





- Neck Exam


Neck Exam: Full ROM





- Respiratory Exam


Respiratory Exam: Wheezes (slight at lower lung bases s/p duoneb treatment 15 

min prior).  absent: Accessory Muscle Use





- Cardiovascular Exam


Cardiovascular Exam: REGULAR RHYTHM, +S1, +S2





- GI/Abdominal Exam


GI & Abdominal Exam: Soft, Normal Bowel Sounds.  absent: Tenderness





- Extremities Exam


Extremities Exam: absent: Calf Tenderness





- Back Exam


Back Exam: absent: CVA tenderness (L), CVA tenderness (R)





- Neurological Exam


Neurological Exam: Alert, Awake, CN II-XII Intact, Oriented x3





- Psychiatric Exam


Psychiatric exam: Normal Affect, Normal Mood





Assessment and Plan





- Assessment and Plan (Free Text)


Plan: 





A: 61 y/o F with PMHx of HTN, DM type 2, Stomach Ulcer, Gastritis, Asthma 

admitted with asthma exacerbation. 





P: 





Asthma Exacerbation


-Severe persistent Asthma; night time symptoms resolved


-MedSurg unit


-most likely 2/2 viral upper respiratory infection


-Duoneb neb every 3 hours kervin


-Methylprednisolone 40 mg IVP BID 


-advair diskus inh Q12: increased to 500/50


-Montelukast 10 mg po HS


-mucinex DM 


-chest physiotherapy TID


-bcx: negative for 3D


-peak flow monitoring


-pulmonology consulted: Dr. Monk; recommendations appreciated: ABG, CXR, EKG, 

ECHO; steroids increase to BID


-f/u: ABG, CXR, EKG, ECHO


-monitor v/s and oral thrush: negative for thrush





Hypertension


-decreased HCTZ to 12.5 q D


-d/c lisinopril 2/2 dry cough; added losartan 25 mg QD


-monitor bp





Diabetes Mellitus type 2


-c/w home metformin 


-added Levemir 10u qHS


-accucheck before breakfast


-hypoglycemic protocol with SS


-diabetic diet





Constipation


-Resolved. BM this AM


-s/p Maalox and Colace





DVT prophylaxis


-lovenox





HIV screen:


-pending





Discharge plan


-in progress

## 2018-04-17 NOTE — RAD
HISTORY:

SOB  



COMPARISON:

4/12/2018



TECHNIQUE:

Chest PA and lateral



FINDINGS:



LUNGS:

No active pulmonary disease.



PLEURA:

No significant pleural effusion identified. No pneumothorax apparent.



CARDIOVASCULAR:

Normal.



OSSEOUS STRUCTURES:

No significant abnormalities.



VISUALIZED UPPER ABDOMEN:

Normal.



OTHER FINDINGS:

None.



IMPRESSION:

No active disease.

## 2018-04-18 VITALS
SYSTOLIC BLOOD PRESSURE: 119 MMHG | RESPIRATION RATE: 18 BRPM | OXYGEN SATURATION: 96 % | TEMPERATURE: 97.7 F | DIASTOLIC BLOOD PRESSURE: 72 MMHG

## 2018-04-18 VITALS — HEART RATE: 61 BPM

## 2018-04-18 LAB
BUN SERPL-MCNC: 28 MG/DL (ref 7–17)
CALCIUM SERPL-MCNC: 9.9 MG/DL (ref 8.4–10.2)
ERYTHROCYTE [DISTWIDTH] IN BLOOD BY AUTOMATED COUNT: 13.2 % (ref 11.5–14.5)
GFR NON-AFRICAN AMERICAN: > 60
HGB BLD-MCNC: 15.9 G/DL (ref 12–16)
MCH RBC QN AUTO: 30.5 PG (ref 27–31)
MCHC RBC AUTO-ENTMCNC: 34 G/DL (ref 33–37)
MCV RBC AUTO: 89.5 FL (ref 81–99)
PLATELET # BLD: 314 K/UL (ref 130–400)
RBC # BLD AUTO: 5.23 MIL/UL (ref 3.8–5.2)
WBC # BLD AUTO: 11.9 K/UL (ref 4.8–10.8)

## 2018-04-18 RX ADMIN — METHYLPREDNISOLONE SODIUM SUCCINATE SCH MG: 40 INJECTION, POWDER, FOR SOLUTION INTRAMUSCULAR; INTRAVENOUS at 08:56

## 2018-04-18 RX ADMIN — IPRATROPIUM BROMIDE AND ALBUTEROL SULFATE SCH ML: .5; 3 SOLUTION RESPIRATORY (INHALATION) at 11:04

## 2018-04-18 RX ADMIN — IPRATROPIUM BROMIDE AND ALBUTEROL SULFATE SCH: .5; 3 SOLUTION RESPIRATORY (INHALATION) at 03:37

## 2018-04-18 RX ADMIN — PROMETHAZINE HYDROCHLORIDE AND CODEINE PHOSPHATE SCH: 6.25; 1 SOLUTION ORAL at 13:19

## 2018-04-18 RX ADMIN — IPRATROPIUM BROMIDE AND ALBUTEROL SULFATE SCH: .5; 3 SOLUTION RESPIRATORY (INHALATION) at 05:00

## 2018-04-18 RX ADMIN — PROMETHAZINE HYDROCHLORIDE AND CODEINE PHOSPHATE SCH: 6.25; 1 SOLUTION ORAL at 04:30

## 2018-04-18 RX ADMIN — PANTOPRAZOLE SODIUM SCH MG: 40 TABLET, DELAYED RELEASE ORAL at 08:56

## 2018-04-18 RX ADMIN — ENOXAPARIN SODIUM SCH MG: 40 INJECTION SUBCUTANEOUS at 08:55

## 2018-04-18 RX ADMIN — IPRATROPIUM BROMIDE AND ALBUTEROL SULFATE SCH: .5; 3 SOLUTION RESPIRATORY (INHALATION) at 14:32

## 2018-04-18 RX ADMIN — CALCIUM CARBONATE-VITAMIN D TAB 500 MG-200 UNIT SCH TAB: 500-200 TAB at 08:55

## 2018-04-18 RX ADMIN — FLUTICASONE PROPIONATE AND SALMETEROL SCH PUFF: 50; 500 POWDER RESPIRATORY (INHALATION) at 08:51

## 2018-04-18 RX ADMIN — PROMETHAZINE HYDROCHLORIDE AND CODEINE PHOSPHATE PRN ML: 6.25; 1 SOLUTION ORAL at 06:22

## 2018-04-18 RX ADMIN — IPRATROPIUM BROMIDE AND ALBUTEROL SULFATE SCH ML: .5; 3 SOLUTION RESPIRATORY (INHALATION) at 07:46

## 2018-04-18 RX ADMIN — ALUMINUM HYDROXIDE, MAGNESIUM HYDROXIDE, AND SIMETHICONE SCH ML: 200; 200; 20 SUSPENSION ORAL at 08:55

## 2018-04-18 NOTE — CP.PCM.DIS
Provider





- Provider


Date of Admission: 


04/13/18 16:27





Attending physician: 


Estella Sam MD





Time Spent in preparation of Discharge (in minutes): 20





Diagnosis





- Discharge Diagnosis


(1) Asthma with severe exacerbation


Status: Acute   Priority: High   





Hospital Course





- Lab Results


Lab Results: 


 Micro Results





04/12/18 22:20   Blood-Venous   Blood Culture - Final


                            NO GROWTH AFTER 5 DAYS


04/12/18 22:20   Blood-Venous   Gram Stain - Final


                            TEST NOT PERFORMED


04/12/18 22:20   Blood-Venous   Blood Culture - Final


                            NO GROWTH AFTER 5 DAYS


04/12/18 22:20   Blood-Venous   Gram Stain - Final


                            TEST NOT PERFORMED





 Most Recent Lab Values











WBC  11.9 K/uL (4.8-10.8)  H  04/18/18  05:55    


 


RBC  5.23 Mil/uL (3.80-5.20)  H  04/18/18  05:55    


 


Hgb  15.9 g/dL (12.0-16.0)   04/18/18  05:55    


 


Hct  46.7 % (34.0-47.0)   04/18/18  05:55    


 


MCV  89.5 fl (81.0-99.0)   04/18/18  05:55    


 


MCH  30.5 pg (27.0-31.0)   04/18/18  05:55    


 


MCHC  34.0 g/dL (33.0-37.0)   04/18/18  05:55    


 


RDW  13.2 % (11.5-14.5)   04/18/18  05:55    


 


Plt Count  314 K/uL (130-400)   04/18/18  05:55    


 


MPV  8.5 fl (7.2-11.7)   04/13/18  06:25    


 


Neut % (Auto)  86.5 % (50.0-75.0)  H  04/13/18  06:25    


 


Lymph % (Auto)  12.1 % (20.0-40.0)  L  04/13/18  06:25    


 


Mono % (Auto)  1.1 % (0.0-10.0)   04/13/18  06:25    


 


Eos % (Auto)  0.1 % (0.0-4.0)   04/13/18  06:25    


 


Baso % (Auto)  0.2 % (0.0-2.0)   04/13/18  06:25    


 


Neut # (Auto)  5.2 K/uL (1.8-7.0)   04/13/18  06:25    


 


Lymph # (Auto)  0.7 K/uL (1.0-4.3)  L  04/13/18  06:25    


 


Mono # (Auto)  0.1 K/uL (0.0-0.8)   04/13/18  06:25    


 


Eos # (Auto)  0.0 K/uL (0.0-0.7)   04/13/18  06:25    


 


Baso # (Auto)  0.0 K/uL (0.0-0.2)   04/13/18  06:25    


 


PT  12.3 Seconds (9.8-13.1)   04/12/18  22:00    


 


INR  1.1  (0.9-1.2)   04/12/18  22:00    


 


APTT  45.8 Seconds (25.6-37.1)  H  04/12/18  22:00    


 


Sodium  138 mmol/l (132-148)   04/18/18  05:55    


 


Potassium  5.0 MMOL/L (3.6-5.0)   04/18/18  05:55    


 


Chloride  94 mmol/L ()  L  04/18/18  05:55    


 


Carbon Dioxide  30 mmol/L (22-30)   04/18/18  05:55    


 


Anion Gap  19  (10-20)   04/18/18  05:55    


 


BUN  28 mg/dl (7-17)  H  04/18/18  05:55    


 


Creatinine  0.7 mg/dl (0.7-1.2)   04/18/18  05:55    


 


Est GFR ( Amer)  > 60   04/18/18  05:55    


 


Est GFR (Non-Af Amer)  > 60   04/18/18  05:55    


 


POC Glucose (mg/dL)  219 mg/dL ()  H  04/18/18  05:27    


 


Random Glucose  249 mg/dL ()  H  04/18/18  05:55    


 


Calcium  9.9 mg/dL (8.4-10.2)   04/18/18  05:55    


 


Phosphorus  3.7 mg/dl (2.5-4.5)   04/12/18  22:00    


 


Magnesium  1.9 MG/DL (1.6-2.3)   04/12/18  22:00    


 


Total Bilirubin  0.8 mg/dl (0.2-1.3)   04/16/18  05:45    


 


AST  18 U/L (14-36)   04/16/18  05:45    


 


ALT  41 U/L (9-52)   04/16/18  05:45    


 


Alkaline Phosphatase  102 U/L ()   04/16/18  05:45    


 


Troponin I  < 0.0120 ng/mL (0.00-0.120)   04/13/18  06:25    


 


NT-Pro-B Natriuret Pep  149 pg/ml (0-900)   04/12/18  22:00    


 


Total Protein  7.6 G/DL (6.3-8.2)   04/16/18  05:45    


 


Albumin  4.1 g/dL (3.5-5.0)   04/16/18  05:45    


 


Globulin  3.5 gm/dL (2.2-3.9)   04/16/18  05:45    


 


Albumin/Globulin Ratio  1.2  (1.0-2.1)   04/16/18  05:45    


 


HIV 1&2 Ag/Ab, 4th Gen  Nonreactive  (Nonreactive)   04/16/18  11:24    


 


Influenza Typ A,B (EIA)  Negative for flu a/b  (NEGATIVE)   04/12/18  22:30    














- Hospital Course


Hospital Course: 





63 y/o F with PMHx of HTN, DM type 2, Stomach Ulcer, Gastritis, Asthma admitted 

with asthma exacerbation. Pt stabilized with Duoneb, albuterol, Advair diskus 

changed to 500/50, methylprednisolone taper, and montelukast. HTN meds changed: 

d/c lisinopril 2/2 dry cough, replaced with losartan 25 mg qd and decreased 

HCTZ to 12.5 qd. DM2: levamir 10 u added qhs. d/c home with medrol dose pack. 





Discharge Exam





- Head Exam


Head Exam: NORMAL INSPECTION





- Eye Exam


Eye Exam: EOMI





- Respiratory Exam


Respiratory Exam: Clear to PA & Lateral.  absent: Wheezes





- Cardiovascular Exam


Cardiovascular Exam: REGULAR RHYTHM, +S1, +S2





- GI/Abdominal Exam


GI & Abdominal Exam: Normal Bowel Sounds, Soft.  absent: Tenderness





- Extremities Exam


Extremities exam: calf tenderness





- Back Exam


Back exam: absent: CVA tenderness (L), CVA tenderness (R)





- Neurological Exam


Neurological exam: Alert, CN II-XII Intact, Oriented x3





- Psychiatric Exam


Psychiatric exam: Normal Affect, Normal Mood





Discharge Plan





- Discharge Medications


Prescriptions: 


Acetaminophen [Tylenol 325mg tab] 650 mg PO Q6 PRN #30 tab


 PRN Reason: Pain, Mild (1-3)


Albuterol HFA [Ventolin HFA 90 mcg/actuation (8 g)] 90 mcg PO PRN PRN 30 Days  

inhaler


 PRN Reason: Shortness Of Breath


Fluticasone/Salmeterol 500/50 [Advair Diskus 500/50] 1 puff IH Q12 #1 unit


hydroCHLOROthiazide [Microzide] 12.5 mg PO DAILY #30 cap


Losartan [Cozaar] 25 mg PO DAILY #30 tab


metFORMIN [glucOPHAGE] 500 mg PO BID #30 tab


Methylprednisolone [Medrol Dose Pack (21 tabs)] 4 mg PO DAILY #21 mg


Montelukast [Singulair] 10 mg PO HS 30 Days #30 tab





- Follow Up Plan


Condition: FAIR


Disposition: HOME/ ROUTINE


Instructions:  Asthma, Adult (DC), Asthma (DC)


Referrals: 


[facility] [place] [Outside]


Abe Harper MD [Family Provider] -

## 2018-04-18 NOTE — CARD
--------------- APPROVED REPORT --------------





EXAM: Two-dimensional and M-mode echocardiogram with Doppler and 

color Doppler.



Other Information 

Quality : AverageRhythm : NSR



INDICATION

Dyspnea 



2D DIMENSIONS 

IVSd1.05   (0.7-1.1cm)LVDd4.31   (3.9-5.9cm)

LVOT Diameter1.75   (1.8-2.4cm)PWd0.73   (0.7-1.1cm)

IVSs1.20   (0.8-1.2cm)LVDs2.96   (2.5-4.0cm)

FS (%) 31.2   %PWs1.26   (0.8-1.2cm)



M-Mode DIMENSIONS 

Left Atrium (MM)4.03   (2.5-4.0cm)IVSd1.38   (0.7-1.1cm)

Aortic Root2.68   (2.2-3.7cm)LVDd4.59   (4.0-5.6cm)

Aortic Cusp Exc.1.76   (1.5-2.0cm)PWd1.09   (0.7-1.1cm)

IVSs1.47   cmFS (%) 48   %

LVDs2.38   (2.0-3.8cm)PWs1.85   cm



Mitral Valve

MV E Vvphbcjd01.6cm/sMV DECEL [id number] A Mvtilvwb61.7cm/s

MV OQL41ovS/A ratio0.9MVA (PHT)4.21cm2



TDI

Lateral E' Peak V7.33cm/sMedial E' Peak V6.52cm/sE/Lateral E'8.3

E/Medial E'9.3



Pulmonary Valve

PV Peak Fbpouygh392.4cm/s



 LEFT VENTRICLE 

The left ventricle is normal size.

There is normal left ventricular wall thickness.

The left ventricular function is normal.

The left ventricular ejection fraction is within the normal range.

The Ejection Fraction is 60-65%.

There is normal LV segmental wall motion.

Transmitral Doppler flow pattern is Grade I-abnormal relaxation 

pattern.



 RIGHT VENTRICLE 

The right ventricle is normal size.

The right ventricular systolic function is normal.



 ATRIA 

The left atrium size is normal.

The right atrium size is normal.



 AORTIC VALVE 

The aortic valve is normal in structure.

No aortic regurgitation is present.

There is no aortic valvular stenosis. 



 MITRAL VALVE 

The mitral valve is normal in structure.

There is no mitral valve stenosis.

There is no mitral valve regurgitation noted.



 TRICUSPID VALVE 

The tricuspid valve is normal in structure.

There is no tricuspid valve regurgitation noted.

There is no tricuspid valve stenosis. 



 PULMONIC VALVE 

The pulmonary valve is normal in structure.

There is no pulmonic valvular regurgitation. 



 GREAT VESSELS 

The aortic root is normal in size.

The IVC is normal in size and collapses >50% with inspiration.



 PERICARDIAL EFFUSION 

The pericardium appears normal.



<Conclusion>

The left ventricle is normal size.

The left ventricular function is normal.

The left ventricular ejection fraction is within the normal range.

The Ejection Fraction is 60-65%.

## 2018-04-19 ENCOUNTER — HOSPITAL ENCOUNTER (OUTPATIENT)
Dept: HOSPITAL 14 - H.ER | Age: 63
Setting detail: OBSERVATION
LOS: 2 days | Discharge: HOME | End: 2018-04-21
Attending: FAMILY MEDICINE | Admitting: FAMILY MEDICINE
Payer: MEDICARE

## 2018-04-19 VITALS — BODY MASS INDEX: 31.6 KG/M2

## 2018-04-19 DIAGNOSIS — Z80.3: ICD-10-CM

## 2018-04-19 DIAGNOSIS — F41.9: ICD-10-CM

## 2018-04-19 DIAGNOSIS — Z79.82: ICD-10-CM

## 2018-04-19 DIAGNOSIS — I10: ICD-10-CM

## 2018-04-19 DIAGNOSIS — E78.00: ICD-10-CM

## 2018-04-19 DIAGNOSIS — Z86.018: ICD-10-CM

## 2018-04-19 DIAGNOSIS — E11.9: ICD-10-CM

## 2018-04-19 DIAGNOSIS — Z79.899: ICD-10-CM

## 2018-04-19 DIAGNOSIS — F32.9: ICD-10-CM

## 2018-04-19 DIAGNOSIS — Z79.84: ICD-10-CM

## 2018-04-19 DIAGNOSIS — Z87.11: ICD-10-CM

## 2018-04-19 DIAGNOSIS — R07.89: Primary | ICD-10-CM

## 2018-04-19 DIAGNOSIS — J45.909: ICD-10-CM

## 2018-04-19 DIAGNOSIS — K29.70: ICD-10-CM

## 2018-04-19 DIAGNOSIS — R63.1: ICD-10-CM

## 2018-04-19 DIAGNOSIS — F31.9: ICD-10-CM

## 2018-04-19 LAB
ALBUMIN SERPL-MCNC: 3.7 G/DL (ref 3.5–5)
ALBUMIN/GLOB SERPL: 1.1 {RATIO} (ref 1–2.1)
ALT SERPL-CCNC: 51 U/L (ref 9–52)
APTT BLD: 26.3 SECONDS (ref 25.6–37.1)
AST SERPL-CCNC: 44 U/L (ref 14–36)
BASOPHILS # BLD AUTO: 0.1 K/UL (ref 0–0.2)
BASOPHILS NFR BLD: 0.7 % (ref 0–2)
BNP SERPL-MCNC: 86.3 PG/ML (ref 0–900)
BUN SERPL-MCNC: 28 MG/DL (ref 7–17)
CALCIUM SERPL-MCNC: 9.2 MG/DL (ref 8.4–10.2)
EOSINOPHIL # BLD AUTO: 0.1 K/UL (ref 0–0.7)
EOSINOPHIL NFR BLD: 1.2 % (ref 0–4)
ERYTHROCYTE [DISTWIDTH] IN BLOOD BY AUTOMATED COUNT: 13 % (ref 11.5–14.5)
GFR NON-AFRICAN AMERICAN: > 60
HGB BLD-MCNC: 16.5 G/DL (ref 12–16)
INR PPP: 0.9 (ref 0.9–1.2)
LYMPHOCYTES # BLD AUTO: 3.3 K/UL (ref 1–4.3)
LYMPHOCYTES NFR BLD AUTO: 28.3 % (ref 20–40)
MCH RBC QN AUTO: 30.4 PG (ref 27–31)
MCHC RBC AUTO-ENTMCNC: 34.3 G/DL (ref 33–37)
MCV RBC AUTO: 88.8 FL (ref 81–99)
MONOCYTES # BLD: 0.8 K/UL (ref 0–0.8)
MONOCYTES NFR BLD: 7 % (ref 0–10)
NEUTROPHILS # BLD: 7.3 K/UL (ref 1.8–7)
NEUTROPHILS NFR BLD AUTO: 62.8 % (ref 50–75)
NRBC BLD AUTO-RTO: 0 % (ref 0–0)
PLATELET # BLD: 337 K/UL (ref 130–400)
PMV BLD AUTO: 8.1 FL (ref 7.2–11.7)
PROTHROMBIN TIME: 10.4 SECONDS (ref 9.8–13.1)
RBC # BLD AUTO: 5.41 MIL/UL (ref 3.8–5.2)
WBC # BLD AUTO: 11.7 K/UL (ref 4.8–10.8)

## 2018-04-19 PROCEDURE — 94640 AIRWAY INHALATION TREATMENT: CPT

## 2018-04-19 PROCEDURE — 84484 ASSAY OF TROPONIN QUANT: CPT

## 2018-04-19 PROCEDURE — 99285 EMERGENCY DEPT VISIT HI MDM: CPT

## 2018-04-19 PROCEDURE — 85027 COMPLETE CBC AUTOMATED: CPT

## 2018-04-19 PROCEDURE — 93005 ELECTROCARDIOGRAM TRACING: CPT

## 2018-04-19 PROCEDURE — 94150 VITAL CAPACITY TEST: CPT

## 2018-04-19 PROCEDURE — 82948 REAGENT STRIP/BLOOD GLUCOSE: CPT

## 2018-04-19 PROCEDURE — 93970 EXTREMITY STUDY: CPT

## 2018-04-19 PROCEDURE — 85730 THROMBOPLASTIN TIME PARTIAL: CPT

## 2018-04-19 PROCEDURE — 85610 PROTHROMBIN TIME: CPT

## 2018-04-19 PROCEDURE — 80053 COMPREHEN METABOLIC PANEL: CPT

## 2018-04-19 PROCEDURE — 85025 COMPLETE CBC W/AUTO DIFF WBC: CPT

## 2018-04-19 PROCEDURE — 36415 COLL VENOUS BLD VENIPUNCTURE: CPT

## 2018-04-19 PROCEDURE — 83880 ASSAY OF NATRIURETIC PEPTIDE: CPT

## 2018-04-19 PROCEDURE — 80048 BASIC METABOLIC PNL TOTAL CA: CPT

## 2018-04-19 PROCEDURE — 71045 X-RAY EXAM CHEST 1 VIEW: CPT

## 2018-04-19 NOTE — ED PDOC
HPI: Chest Pain


Chief Complaint (Provider): My chest hurts


History Per: Patient


History/Exam Limitations: no limitations


Onset/Duration Of Symptoms: Days


Current Symptoms Are (Timing): Still Present


Severity: Moderate


Quality: Burning, Squeezing, "Pain".  denies: Tightness, Pressure


Associated Symptoms: denies: Nausea, Dyspnea, Diaphoresis, Syncope


Modifying Factors: None


Exacerbating Factors: Deep Breathing, Exertion


Alleviating Factors: None


Additional History Per: Patient





<Charles Delaney - Last Filed: 04/19/18 19:17>





<Samuel Lujan - Last Filed: 04/19/18 19:25>


Time Seen by Provider: 04/19/18 18:03


Chief Complaint (Nursing): Chest Pain


Additional Complaint(s): 





62 year old female sent from [facility] for evaluation of chest pain. Patient 

states she has had this chest pain for some time now, was discharged from the 

hospital yesterday but did not feel well when she left. She has burning chest 

pain, palpitations, dyspnea on exertion and has had cold sweats, polydipsia, 

polyuria, and left calf pain. She 'does not think this chest pain is gastritis, 

because she has had that in the past.' DUGGAN occurs even with walking to the 

bathroom. She had dizziness yesterday, not now.


ROS: denies abd pain, nausea, vomiting, diarrhea





PMD: Dr. PORTIA Harper at [facility]





PMHX: HTN, DM type 2, Gastritis, Asthma


medications: as per med rec- reconciled in ED


Allergies: Iodine: burning sensation, an unknown antibiotic: rash


Family Hx: Mother: passed from breast cancer, Father: passed from hepatitis


Surgical Hx: Hysterectomy, Cardiac cath (12 years ago) without stent placement, 

Lipoma removal


Socialx: never smoker, denies etoh/illicit drugs





code status: full code (Charles Delaney)





Sent from clinic for ekg changes.





Pt. dc recently form hospital for dyspnea.  





Chest pain present, but same as previous and ongoing for a long time.  (Samuel Lujan)





Past Medical History





- Medical History


PMH: Anxiety, Asthma, Bipolar Disorder, Bronchitis, Depression, Diabetes, 

Diverticulitis, Gastritis, HTN, Hypercholesterolemia


   Denies: HIV, Chronic Kidney Disease





- Family History


Family History: States: Diabetes, Hypertension





<Charles Delaney - Last Filed: 04/19/18 19:17>





<Samuel Lujan - Last Filed: 04/19/18 19:25>


Vital Signs: 





 Last Vital Signs











Temp  97.5 F L  04/19/18 17:42


 


Pulse  58 L  04/19/18 17:42


 


Resp  16   04/19/18 17:42


 


BP  141/83   04/19/18 17:42


 


Pulse Ox  99   04/19/18 19:18














- Home Medications


Home Medications: 


 Ambulatory Orders











 Medication  Instructions  Recorded


 


Fluticasone/Salmeterol 500/50 1 puff IH Q12 #1 unit 04/18/18





[Advair Diskus 500/50]  


 


Losartan [Cozaar] 25 mg PO DAILY #30 tab 04/18/18


 


Methylprednisolone [Medrol Dose 4 mg PO DAILY #21 mg 04/18/18





Pack (21 tabs)]  


 


Montelukast [Singulair] 10 mg PO HS 30 Days #30 tab 04/18/18


 


hydroCHLOROthiazide [Microzide] 12.5 mg PO DAILY #30 cap 04/18/18


 


metFORMIN [glucOPHAGE] 500 mg PO BID #30 tab 04/18/18


 


Albuterol HFA [Ventolin HFA 90 2 puff IH Q6 PRN 04/19/18





mcg/actuation (8 g)]  


 


Albuterol/Ipratropium [Duoneb 3 3 ml IH Q6 PRN 04/19/18





mg/0.5 mg (3 ml) UD]  


 


Atorvastatin [Lipitor] 20 mg PO DAILY 04/19/18


 


Omeprazole [Omeprazole] 40 mg PO BID 04/19/18














- Allergies


Allergies/Adverse Reactions: 


 Allergies











Allergy/AdvReac Type Severity Reaction Status Date / Time


 


iodine Allergy  RASH Verified 11/21/17 20:31














Wells Criteria for PE





- Wells Criteria for Pulmonary Embolism


Clinical Signs and Symptoms of DVT: Yes (sob, +hommans)


P.E is #1 Diagnosis, or Equally Likely: Yes


Heart Rate >100: No


Immobilization at least 3 days;Surgery previous 4 weeks: No


Previous, objectively diagnosed PE or DVT: No


Hemoptysis: No


Malignancy w/treatment within 6 months, or palliative: No


Total Score: 4





<Charles Dleaney - Last Filed: 04/19/18 19:17>





Review of Systems


Constitutional: Positive for: Chills, Sweats, Weakness, Malaise.  Negative for: 

Fever


Eyes: Negative for: Pain, Vision Change


ENT: Negative for: Ear Discharge, Nose Congestion


Cardiovascular: Positive for: Chest Pain, Palpitations, Light Headedness.  

Negative for: Orthopnea, Paroxysmal Noc. Dyspnea, Edema


Respiratory: Positive for: Shortness of Breath, SOB with Exertion.  Negative for

: Cough, Hemoptysis, Sputum, Wheezing


Gastrointestinal: Negative for: Nausea, Vomiting, Abdominal Pain, Diarrhea, 

Constipation


Genitourinary Female: Negative for: Dysuria, Frequency


Musculoskeletal: Negative for: Neck Pain, Arm Pain


Skin: Negative for: Rash


Neurological: Positive for: Headache, Dizziness.  Negative for: Confusion, 

Seizures, Altered Mental Status





<Charles Delaney - Last Filed: 04/19/18 19:17>





Physical Exam





- Physical Exam


Appears: Positive for: Uncomfortable (ill appearing)


Head Exam: Positive for: ATRAUMATIC, NORMAL INSPECTION, NORMOCEPHALIC


Skin: Positive for: Normal Color, Warm


Eye Exam: Positive for: EOMI, Normal appearance, PERRL


Neck: Positive for: Normal, Painless ROM


Cardiovascular/Chest: Positive for: Regular Rate, Rhythm, Chest Non Tender.  

Negative for: Edema, JVD, Murmur, Irregularly Irregular


Respiratory: Positive for: Decreased Breath Sounds (diffusely).  Negative for: 

Accessory Muscle Use, Rales, Rhonchi, Stridor, Wheezing, Respiratory Distress


Gastrointestinal/Abdominal: Positive for: Normal Exam, Bowel Sounds, Soft.  

Negative for: Tenderness, Organomegaly


Extremity: Positive for: Calf Tenderness (left side), Other (left calf appears 

larger than right).  Negative for: Pedal Edema


Neurologic/Psych: Positive for: Alert, CNs II-XII, Oriented





<Charles Delaney - Last Filed: 04/19/18 19:17>





- Physical Exam


Neck: Positive for: Painless ROM


Cardiovascular/Chest: Positive for: Regular Rate, Rhythm, Chest Non Tender


Respiratory: Positive for: Decreased Breath Sounds.  Negative for: Wheezing


Extremity: Positive for: Calf Tenderness





<Samuel Lujan - Last Filed: 04/19/18 19:25>





- Laboratory Results


Result Diagrams: 


 04/19/18 18:20





 04/19/18 18:20





- ECG


ECG Rhythm: Positive for: Sinus Bradycardia


O2 Sat by Pulse Oximetry: 99





<Charles Delaney - Last Filed: 04/19/18 19:17>





- Laboratory Results


Result Diagrams: 


 04/19/18 18:20





 04/19/18 18:20


Interpretation Of Abn Labs: 28 bun





- ECG


ECG: Positive for: Interpreted By Me


ECG Rhythm: Positive for: Normal QRS, Sinus Rhythm, Nonspecific Changes


Pulse Ox Interpretation: Normal





- Radiology


X-Ray: Read By Radiologist


X-Ray Interpretation: No Acute Disease





<Samuel Lujan - Last Filed: 04/19/18 19:25>





- Progress


ED Course And Treament: 


62 year old female with NIDDM, HTN, Asthma, obesity with burning chest pain, 

left calf pain.


-patient was discharged from hospital yesterday, still taking her old 

medications, did not  new medications yet. 


-rule out DVT/PE








--CBC


--CMP


--BNP


--Troponins


--EKG


--IVF


--CXR


--LE doppler


--duoneb





Will hold off on CTA-patient has allergy to contrast





Patient seen and examined with Dr. Lujan (Charles Delaney)





1923:  Stable.  Will hold cta for pe eval as pt. has allergy to iodine.  US neg 

for dvt.  Decreases likeliness of blood clot but [facility] will admit and monitor.  

Pt. pain free.  AAOx3.    (Samuel Lujan)





Disposition





<Charles Delaney - Last Filed: 04/19/18 19:17>





- Patient ED Disposition


Is Patient to be Admitted: Yes


Counseled Patient/Family Regarding: Studies Performed, Diagnosis





- Disposition


Disposition Time: 19:25





- Pt Status Changed To:


Hospital Disposition Of: Observation





- POA


Present On Arrival: None





<Samuel Lujan - Last Filed: 04/19/18 19:25>





- Clinical Impression


Clinical Impression: 


 Chest pain, Acute electrocardiogram changes








- Disposition


Condition: FAIR

## 2018-04-19 NOTE — CP.PCM.HP
History of Present Illness





- History of Present Illness


History of Present Illness: 





61 yo ,f, PMhx/o  HTN, DM type 2, Stomach Ulcer, Gastritis, Asthma, presents to 

ED referred from clinic for evaluation of chest pain. Patient recently 

discharged from hospital 1 day ago for acute asthma exacerbation . Patient 

reports that the day before discharge she started feeling midsternal chest pain

, pinching sensation, not radiated, intermittent, 3 t/day, while walking or at 

rest, alleviated by itself, different like when she has acid reflux or gastritis

, associated with mild SOB on exertion at home. Patient evaluated in clinic 

today for f/u after discharged and reports other episode of chest pain while 

waiting to be seen associated with nausea, cold diaphoresis, alleviated with 

aspirin.  She also c/o left calf pain in Ed, but states that calf pain has been 

for 1 month, intermittent, only when she climb stairs or sometimes while 

walking. Denies  claudication. She denies fever, vomiting, pyrosis, pleuritic 

chest pain, hemoptysis, recent travel, hx/o DVT.  On evaluation patient in ED, 

free of pain, reproducible low midsternal, but states pain is different , 

vitals normal. 





PMD: Dr. PORTIA Harper at [facility].seen By Dr Summers today 


Full Code


PMHX:HTN, DM type 2, Stomach Ulcer, Gastritis, Asthma


Allergies: Iodine: burning sensation, an unknown antibiotic: rash


FHx: Mother: passed from breast cancer, Father: passed from hepatitis


SHx: Hysterectomy, Cardiac cath (12 years ago) without stent placement, Lipoma 

removal


Socialhx: never smoker, denies etoh/illicit drugs





Ed Course: 


VS: normal  except BP: 141/83 HR: 58


PE:  low mid sternum reproducible chest pain 


Labs:  CBC: 11.7>16.5<337  CMP: [id number] 


Imaging: EKG: sinus bradycarcia, short [place]. T wave inverted V2.  Same on 

discharge 2 days ago and  on EKG 11/2017. 


Meds:  Asprin in clinic 


ED: aspirin, Duoneb,  ml 





Present on Admission





- Present on Admission


Any Indicators Present on Admission: No


History of DVT/PE: No


History of Uncontrolled Diabetes: No


Urinary Catheter: No


Decubitus Ulcer Present: No





Review of Systems





- Review of Systems


All systems: reviewed and no additional remarkable complaints except





- Cardiovascular


Cardiovascular: Chest Pain





- Respiratory


Respiratory: Dyspnea on Exertion





- Gastrointestinal


Gastrointestinal: Nausea





Past Patient History





- Infectious Disease


Hx of Infectious Diseases: None





- Past Medical History & Family History


Past Medical History?: Yes





- Past Social History


Smoking Status: Never Smoked





- CARDIAC


Hx Hypercholesterolemia: Yes


Hx Hypertension: Yes





- PULMONARY


Hx Asthma: Yes


Hx Bronchitis: Yes





- NEUROLOGICAL


Hx Neurological Disorder: No





- HEENT


Hx HEENT Problems: No





- RENAL


Hx Chronic Kidney Disease: No





- ENDOCRINE/METABOLIC


Hx Endocrine Disorders: Yes


Hx Diabetes Mellitus Type 2: Yes





- HEMATOLOGICAL/ONCOLOGICAL


Hx Human Immunodeficiency Virus (HIV): No





- INTEGUMENTARY


Hx Dermatological Problems: No





- MUSCULOSKELETAL/RHEUMATOLOGICAL


Hx Musculoskeletal Disorders: No


Hx Falls: No





- GASTROINTESTINAL


Hx Diverticulitis: Yes


Hx Gastritis: Yes





- GENITOURINARY/GYNECOLOGICAL


Hx Genitourinary Disorders: No





- PSYCHIATRIC


Hx Anxiety: Yes


Hx Bipolar Disorder: Yes


Hx Depression: Yes





- SURGICAL HISTORY


Hx Surgeries: Yes


Hx Cardiac Catheterization: Yes


Hx Hysterectomy: Yes


Other/Comment: left lipoma. oophorectomy





- ANESTHESIA


Hx Anesthesia: Yes


Hx Anesthesia Reactions: No


Hx Malignant Hyperthermia: No





Meds


Allergies/Adverse Reactions: 


 Allergies











Allergy/AdvReac Type Severity Reaction Status Date / Time


 


iodine Allergy  RASH Verified 11/21/17 20:31














Physical Exam





- Constitutional


Appears: Non-toxic, No Acute Distress





- Head Exam


Head Exam: ATRAUMATIC, NORMOCEPHALIC





- Eye Exam


Eye Exam: Normal appearance





- ENT Exam


ENT Exam: Mucous Membranes Moist





- Neck Exam


Neck exam: Positive for: Normal Inspection





- Respiratory Exam


Respiratory Exam: Clear to Auscultation Bilateral.  absent: Rales, Rhonchi, 

Wheezes





- Cardiovascular Exam


Cardiovascular Exam: REGULAR RHYTHM, +S1, +S2


Additional comments: 





reproducible midsternal chest pain 





- GI/Abdominal Exam


GI & Abdominal Exam: Normal Bowel Sounds, Soft.  absent: Guarding, Rebound





- Extremities Exam


Extremities exam: Positive for: normal capillary refill, normal inspection, 

pedal pulses present.  Negative for: calf tenderness, joint swelling, pedal 

edema, tenderness





- Back Exam


Back exam: NORMAL INSPECTION.  absent: CVA tenderness (L), CVA tenderness (R)





- Neurological Exam


Neurological exam: Alert, CN II-XII Intact, Oriented x3





- Psychiatric Exam


Psychiatric exam: Normal Affect, Normal Mood





- Skin


Skin Exam: Intact, Normal Color





Results





- Vital Signs


Recent Vital Signs: 





 Last Vital Signs











Temp  97.5 F L  04/19/18 17:42


 


Pulse  58 L  04/19/18 17:42


 


Resp  16   04/19/18 17:42


 


BP  141/83   04/19/18 17:42


 


Pulse Ox  99   04/19/18 18:54














- Labs


Result Diagrams: 


 04/19/18 18:20





 04/19/18 18:20


Labs: 





 Laboratory Results - last 24 hr











  04/19/18 04/19/18 04/19/18





  18:04 18:20 18:20


 


WBC    11.7 H


 


RBC    5.41 H


 


Hgb    16.5 H


 


Hct    48.0 H


 


MCV    88.8


 


MCH    30.4


 


MCHC    34.3


 


RDW    13.0


 


Plt Count    337


 


MPV    8.1


 


Neut % (Auto)    62.8


 


Lymph % (Auto)    28.3


 


Mono % (Auto)    7.0


 


Eos % (Auto)    1.2


 


Baso % (Auto)    0.7


 


Neut # (Auto)    7.3 H


 


Lymph # (Auto)    3.3


 


Mono # (Auto)    0.8


 


Eos # (Auto)    0.1


 


Baso # (Auto)    0.1


 


PT   


 


INR   


 


APTT   


 


Sodium   139 


 


Potassium   4.1 


 


Chloride   94 L 


 


Carbon Dioxide   33 H 


 


Anion Gap   16 


 


BUN   28 H 


 


Creatinine   0.8 


 


Est GFR ( Amer)   > 60 


 


Est GFR (Non-Af Amer)   > 60 


 


POC Glucose (mg/dL)  202 H  


 


Random Glucose   204 H 


 


Calcium   9.2 


 


Total Bilirubin   0.9 


 


AST   44 H D 


 


ALT   51 


 


Alkaline Phosphatase   97 


 


Troponin I   < 0.0120 


 


NT-Pro-B Natriuret Pep   86.3 


 


Total Protein   7.0 


 


Albumin   3.7 


 


Globulin   3.3 


 


Albumin/Globulin Ratio   1.1 














  04/19/18





  18:20


 


WBC 


 


RBC 


 


Hgb 


 


Hct 


 


MCV 


 


MCH 


 


MCHC 


 


RDW 


 


Plt Count 


 


MPV 


 


Neut % (Auto) 


 


Lymph % (Auto) 


 


Mono % (Auto) 


 


Eos % (Auto) 


 


Baso % (Auto) 


 


Neut # (Auto) 


 


Lymph # (Auto) 


 


Mono # (Auto) 


 


Eos # (Auto) 


 


Baso # (Auto) 


 


PT  10.4


 


INR  0.9


 


APTT  26.3


 


Sodium 


 


Potassium 


 


Chloride 


 


Carbon Dioxide 


 


Anion Gap 


 


BUN 


 


Creatinine 


 


Est GFR ( Amer) 


 


Est GFR (Non-Af Amer) 


 


POC Glucose (mg/dL) 


 


Random Glucose 


 


Calcium 


 


Total Bilirubin 


 


AST 


 


ALT 


 


Alkaline Phosphatase 


 


Troponin I 


 


NT-Pro-B Natriuret Pep 


 


Total Protein 


 


Albumin 


 


Globulin 


 


Albumin/Globulin Ratio 














Assessment & Plan





- Assessment and Plan (Free Text)


Plan: 





61 yo ,f, PMhx/o  HTN, DM type 2, Stomach Ulcer, Gastritis, Asthma admitted for 

chest pain 


 Assessment/Plan





1) Chest pain 


-to r/o ACS


-chest pain reproducible but patient reports is different


-admit telemetry


-EKG in clinic: acute change V3 t wave invertion that normalized in hospital. 

Old V2 t wave invertion 


-troponin x 1 normal 


-s/p aspirin 325 mg PO


-c/w Aspirin 81 mg daily , lipitor


-f/u troponin x2


-repeat EKG in 4 hours


-consider cardiology consult due to risk factors





2) HTN


-c/w home meds 





3) Asthma


-recently discharged from hospital 


c/w medications on discharge





4) DM


-Hgba1c  8 on 2/15/18 


-c/w home metformin 


-accucheck ACHS


-hypoglycemic protocol


-diabetic diet





5) Left calf pain


-for  about 1 month worse when climbing stairs.no claudication


-US Duplex lower ext pending report


-consider arterial duplex outpatient.


-makeda sign neg. 





6) DVT Prophylaxis


Lovenox 40 mg sc daily

## 2018-04-19 NOTE — RAD
HISTORY:

dyspnea  



COMPARISON:

Comparison made with prior chest radiograph 04/17/2018 



FINDINGS:



LUNGS:

Suspect minor bibasilar atelectasis.



PLEURA:

No significant pleural effusion identified, no pneumothorax apparent.



CARDIOVASCULAR:

Heart remains enlarged



OSSEOUS STRUCTURES:

No significant abnormalities.



VISUALIZED UPPER ABDOMEN:

Normal.



OTHER FINDINGS:

None.



IMPRESSION:

Suspect minor bibasilar atelectasis.

## 2018-04-20 LAB
ALBUMIN SERPL-MCNC: 2.9 G/DL (ref 3.5–5)
ALBUMIN/GLOB SERPL: 1 {RATIO} (ref 1–2.1)
ALT SERPL-CCNC: 34 U/L (ref 9–52)
AST SERPL-CCNC: 29 U/L (ref 14–36)
BASOPHILS # BLD AUTO: 0.1 K/UL (ref 0–0.2)
BASOPHILS NFR BLD: 0.6 % (ref 0–2)
BUN SERPL-MCNC: 27 MG/DL (ref 7–17)
CALCIUM SERPL-MCNC: 8.3 MG/DL (ref 8.4–10.2)
EOSINOPHIL # BLD AUTO: 0.3 K/UL (ref 0–0.7)
EOSINOPHIL NFR BLD: 3.2 % (ref 0–4)
ERYTHROCYTE [DISTWIDTH] IN BLOOD BY AUTOMATED COUNT: 13 % (ref 11.5–14.5)
GFR NON-AFRICAN AMERICAN: > 60
HGB BLD-MCNC: 14.5 G/DL (ref 12–16)
LYMPHOCYTES # BLD AUTO: 3.7 K/UL (ref 1–4.3)
LYMPHOCYTES NFR BLD AUTO: 39.5 % (ref 20–40)
MCH RBC QN AUTO: 30.2 PG (ref 27–31)
MCHC RBC AUTO-ENTMCNC: 33.9 G/DL (ref 33–37)
MCV RBC AUTO: 89.2 FL (ref 81–99)
MONOCYTES # BLD: 0.6 K/UL (ref 0–0.8)
MONOCYTES NFR BLD: 5.9 % (ref 0–10)
NEUTROPHILS # BLD: 4.7 K/UL (ref 1.8–7)
NEUTROPHILS NFR BLD AUTO: 50.8 % (ref 50–75)
NRBC BLD AUTO-RTO: 0 % (ref 0–0)
PLATELET # BLD: 285 K/UL (ref 130–400)
PMV BLD AUTO: 7.8 FL (ref 7.2–11.7)
RBC # BLD AUTO: 4.81 MIL/UL (ref 3.8–5.2)
WBC # BLD AUTO: 9.3 K/UL (ref 4.8–10.8)

## 2018-04-20 RX ADMIN — ENOXAPARIN SODIUM SCH MG: 40 INJECTION SUBCUTANEOUS at 10:43

## 2018-04-20 NOTE — US
PROCEDURE:  Bilateral lower extremity venous duplex Doppler. 



HISTORY:

r/o dvt



COMPARISON:

Lower extremity ultrasound dated 08/18/2008. 



TECHNIQUE:

Bilateral common femoral, superficial femoral, popliteal and 

posterior tibial veins were evaluated. Flow was assessed with color 

Doppler, compressibility, assessment of phasic flow and augmentation 

response. 



FINDINGS:



COMMON FEMORAL VEIN:

Right CFV:  Unremarkable.



Left CFV:  Unremarkable.



SUPERFICIAL FEMORAL VEIN:

Right SFV: Unremarkable.



Left SFV:  Unremarkable.



POPLITEAL VEIN:

Right Popliteal:  Unremarkable.



Left Popliteal:  Unremarkable.



POSTERIOR TIBIAL VEIN:

Right PTV: Unremarkable.



Left PTV: Unremarkable.



OTHER FINDINGS:

None.



IMPRESSION:

No evidence of deep venous thrombosis.

## 2018-04-20 NOTE — CP.PCM.PN
Subjective





- Date & Time of Evaluation


Date of Evaluation: 04/20/18


Time of Evaluation: 08:30





- Subjective


Subjective: 





pt seen and examined at bedside. Reports improvement in chest pain and sob. 

Denies significant overnight events. Denies n/v. Tolerating po diet.





Objective





- Vital Signs/Intake and Output


Vital Signs (last 24 hours): 


 











Temp Pulse Resp BP Pulse Ox


 


 97.9 F   53 L  20   125/80   97 


 


 04/20/18 08:02  04/20/18 08:02  04/20/18 08:02  04/20/18 08:02  04/20/18 08:02











- Medications


Medications: 


 Current Medications





Acetaminophen (Tylenol 325mg Tab)  650 mg PO Q6 PRN


   PRN Reason: Pain, Mild (1-3)


Acetaminophen (Tylenol 325mg Tab)  650 mg PO Q6 PRN


   PRN Reason: Fever >100.4 F


Albuterol/Ipratropium (Duoneb 3 Mg/0.5 Mg (3 Ml) Ud)  3 ml INH RQ6 PRN


   PRN Reason: Shortness of Breath


Aspirin (Ecotrin)  81 mg PO DAILY [facility]


Atorvastatin Calcium (Lipitor)  20 mg PO DAILY [facility]


Dextrose (Dextrose 50% Inj)  0 ml IV STAT PRN; Protocol


   PRN Reason: Hypoglycemia Protocol


Dextrose (Glutose 15)  0 gm PO ONCE PRN; Protocol


   PRN Reason: Hypoglycemia Protocol


Enoxaparin Sodium (Lovenox)  40 mg SC DAILY [facility]


   PRN Reason: Protocol


Glucagon (Glucagen Diagnostic Kit)  0 mg IM STAT PRN; Protocol


   PRN Reason: Hypoglycemia Protocol


Hydrochlorothiazide (Microzide)  12.5 mg PO DAILY [facility]


Insulin Detemir (Levemir)  10 units SC HS [facility]


Losartan Potassium (Cozaar)  25 mg PO DAILY [facility]


Metformin HCl (Glucophage)  500 mg PO BID [facility]


Methylprednisolone (Solu-Medrol)  40 mg IVP DAILY [facility]


Montelukast Sodium (Singulair)  10 mg PO HS [facility]


   Last Admin: 04/19/18 21:57 Dose:  10 mg


Morphine Sulfate (Morphine)  2 mg IVP Q6 PRN


   PRN Reason: Pain, severe (8-10)


Ondansetron HCl (Zofran Inj)  4 mg IVP Q6 PRN


   PRN Reason: Nausea/Vomiting


Oxycodone/Acetaminophen (Percocet 5/325 Mg Tab)  1 tab PO Q4 PRN


   PRN Reason: Pain, moderate (4-7)


   Stop: 04/22/18 20:10











- Labs


Labs: 


 





 04/20/18 05:00 





 04/20/18 02:17 





 











PT  10.4 Seconds (9.8-13.1)   04/19/18  18:20    


 


INR  0.9  (0.9-1.2)   04/19/18  18:20    


 


APTT  26.3 Seconds (25.6-37.1)   04/19/18  18:20    














- Constitutional


Appears: Well, No Acute Distress





- Eye Exam


Eye Exam: EOMI





- Neck Exam


Neck Exam: Full ROM





- Respiratory Exam


Respiratory Exam: Prolonged Expiratory Phase





- Cardiovascular Exam


Cardiovascular Exam: REGULAR RHYTHM, +S1, +S2





- GI/Abdominal Exam


GI & Abdominal Exam: Soft, Tenderness (epigastric), Normal Bowel Sounds





- Extremities Exam


Extremities Exam: absent: Calf Tenderness





- Neurological Exam


Neurological Exam: Alert, Awake, CN II-XII Intact, Oriented x3





- Psychiatric Exam


Psychiatric exam: Normal Affect, Normal Mood





Assessment and Plan





- Assessment and Plan (Free Text)


Plan: 





61 yo F PMhx/o  HTN, DM type 2, Stomach Ulcer, Gastritis, Asthma admitted for 

chest pain 





Chest pain 


-to r/o ACS


-chest pain reproducible but patient reports is different; at epigastric region


-admit telemetry


-EKG in clinic: acute change V3 t wave inversion that normalized in hospital. 

Old V2 t wave inversion 


-troponin x 3 normal 


-s/p aspirin 325 mg PO


-c/w Aspirin 81 mg daily, lipitor


-repeat EKG: pending


-Cardiology: Dr. Pavon: recommendations appreciated: atypical chest pain with 

no evidence of ACS. Recommend stress test OP and daily ASA.





HTN


-c/w home meds 





Asthma


-recently discharged from hospital 


c/w medications on discharge


resumed po steroid 40 mg q daily





DM


-Hgba1c 8 on 2/15/18 


-c/w home metformin 


-accucheck ACHS


-hypoglycemic protocol


-diabetic diet


-added levamir 10 U QHS





Left calf pain


-for about 1 month worse when climbing stairs. no claudication


-US Duplex lower ext: neg


-consider arterial duplex outpatient.


-makeda sign neg. 





DVT Prophylaxis


Lovenox 40 mg sc daily

## 2018-04-20 NOTE — CP.PCM.CON
History of Present Illness





- History of Present Illness


History of Present Illness: 





                                      This 62-year-old hypertensive diabetic 

female who has a history of asthma and was recently hospitalized for it was 

rehospitalized for chest discomfort. The patient is not a smoker and has never 

suffered a myocardial infarction or congestive cardiac failure. She admits to a 

fairly sedentary lifestyle but does climb a flight of stairs without having 

experienced any chest pain in the past. The chest pain does not occur during 

physical exertion but mostly while at rest. This is not accompanied by any 

palpitations or exacerbation of dyspnea. She denies any perspiration associated 

with a or nausea or vomiting and the discomfort does not spread to her jaw or 

her arms. There is no family history of vascular disease.


Physical examination shows a middle aged pleasant female who was able to lie 

virtually flat in bed and beats comfortably at 14-16 breaths per minute and can 

carry on a conversation. She was afebrile with a pulse rate of 78 bpm and 

regular and a blood pressure of 124/74 mmHg. Her extremities were warm and her 

nailbeds are pink. There was no central peripheral cyanosis. Her jugular venous 

pressure was not elevated and there was no edema over lower extremities. The 

pedal pulses are well felt. There were no carotid bruits. The apex was in the 

fifth space and the first and second heart sounds were normal. There was no 

murmur or gallop. There were no rales. Her abdomen was soft liver and spleen 

are not palpable.


Her electro-cardiogram showed sinus rhythm with a normal EKG pattern. 

Comparison with her recent electrocardiograms again shows no evolution of ST T 

abnormalities of fresh Q waves. Her cardiac enzymes were negative for any 

evidence of myocyte injury. Her recent echocardiogram done last week shows a 

normal-sized left ventricle with normal regional wall motion and preserved left 

ventricular systolic function. There was no significant valvular abnormality.





Impression: Atypical chest pain with no evidence of acute coronary syndrome. 

The patient may be allowed to return home and come back when she is over her 

asthmatic episode to undergo a stress test. In the meantime she should take an 

aspirin every day to protect herself.





Past Patient History





- Infectious Disease


Hx of Infectious Diseases: None





- Past Medical History & Family History


Past Medical History?: Yes





- Past Social History


Smoking Status: Never Smoked





- CARDIAC


Hx Hypercholesterolemia: Yes


Hx Hypertension: Yes





- PULMONARY


Hx Asthma: Yes





- NEUROLOGICAL


Hx Neurological Disorder: No





- HEENT


Hx HEENT Problems: No





- RENAL


Hx Chronic Kidney Disease: No





- ENDOCRINE/METABOLIC


Hx Endocrine Disorders: Yes


Hx Diabetes Mellitus Type 2: Yes





- HEMATOLOGICAL/ONCOLOGICAL


Hx Human Immunodeficiency Virus (HIV): No





- INTEGUMENTARY


Hx Dermatological Problems: No





- MUSCULOSKELETAL/RHEUMATOLOGICAL


Hx Musculoskeletal Disorders: No


Hx Falls: No





- GASTROINTESTINAL


Hx Diverticulitis: Yes


Hx Gastritis: Yes





- GENITOURINARY/GYNECOLOGICAL


Hx Genitourinary Disorders: No





- PSYCHIATRIC


Hx Anxiety: Yes


Hx Bipolar Disorder: Yes


Hx Depression: Yes


Hx Substance Use: No





- SURGICAL HISTORY


Hx Surgeries: Yes


Hx Cardiac Catheterization: Yes


Hx Hysterectomy: Yes


Other/Comment: left lipoma. oophorectomy





- ANESTHESIA


Hx Anesthesia: Yes


Hx Anesthesia Reactions: No


Hx Malignant Hyperthermia: No


Has any member of the family had a problem w/ anesthesia?: No





Meds


Allergies/Adverse Reactions: 


 Allergies











Allergy/AdvReac Type Severity Reaction Status Date / Time


 


iodine Allergy  RASH Verified 11/21/17 20:31














- Medications


Medications: 


 Current Medications





Acetaminophen (Tylenol 325mg Tab)  650 mg PO Q6 PRN


   PRN Reason: Pain, Mild (1-3)


Acetaminophen (Tylenol 325mg Tab)  650 mg PO Q6 PRN


   PRN Reason: Fever >100.4 F


Albuterol/Ipratropium (Duoneb 3 Mg/0.5 Mg (3 Ml) Ud)  3 ml INH RQ6 PRN


   PRN Reason: Shortness of Breath


Aspirin (Ecotrin)  81 mg PO DAILY [facility]


   Last Admin: 04/20/18 10:44 Dose:  81 mg


Atorvastatin Calcium (Lipitor)  20 mg PO DAILY [facility]


   Last Admin: 04/20/18 10:44 Dose:  20 mg


Dextrose (Dextrose 50% Inj)  0 ml IV STAT PRN; Protocol


   PRN Reason: Hypoglycemia Protocol


Dextrose (Glutose 15)  0 gm PO ONCE PRN; Protocol


   PRN Reason: Hypoglycemia Protocol


Enoxaparin Sodium (Lovenox)  40 mg SC DAILY [facility]


   PRN Reason: Protocol


   Last Admin: 04/20/18 10:43 Dose:  40 mg


Glucagon (Glucagen Diagnostic Kit)  0 mg IM STAT PRN; Protocol


   PRN Reason: Hypoglycemia Protocol


Hydrochlorothiazide (Microzide)  12.5 mg PO DAILY [facility]


   Last Admin: 04/20/18 10:44 Dose:  12.5 mg


Insulin Detemir (Levemir)  10 units SC [facility]


Losartan Potassium (Cozaar)  25 mg PO DAILY [facility]


   Last Admin: 04/20/18 10:44 Dose:  Not Given


Metformin HCl (Glucophage)  500 mg PO BID [facility]


   Last Admin: 04/20/18 10:44 Dose:  500 mg


Methylprednisolone (Solu-Medrol)  40 mg IVP DAILY [facility]


   Last Admin: 04/20/18 10:49 Dose:  40 mg


Montelukast Sodium (Singulair)  10 mg PO HS [facility]


   Last Admin: 04/19/18 21:57 Dose:  10 mg


Morphine Sulfate (Morphine)  2 mg IVP Q6 PRN


   PRN Reason: Pain, severe (8-10)


Ondansetron HCl (Zofran Inj)  4 mg IVP Q6 PRN


   PRN Reason: Nausea/Vomiting


Oxycodone/Acetaminophen (Percocet 5/325 Mg Tab)  1 tab PO Q4 PRN


   PRN Reason: Pain, moderate (4-7)


   Stop: 04/22/18 20:10











Results





- Vital Signs


Recent Vital Signs: 


 Last Vital Signs











Temp  97.9 F   04/20/18 08:02


 


Pulse  50 L  04/20/18 10:44


 


Resp  20   04/20/18 08:02


 


BP  125/80   04/20/18 10:44


 


Pulse Ox  97   04/20/18 08:02














- Labs


Result Diagrams: 


 04/20/18 05:00





 04/20/18 02:17


Labs: 


 Laboratory Results - last 24 hr











  04/19/18 04/19/18 04/19/18





  18:04 18:20 18:20


 


WBC    11.7 H


 


RBC    5.41 H


 


Hgb    16.5 H


 


Hct    48.0 H


 


MCV    88.8


 


MCH    30.4


 


MCHC    34.3


 


RDW    13.0


 


Plt Count    337


 


MPV    8.1


 


Neut % (Auto)    62.8


 


Lymph % (Auto)    28.3


 


Mono % (Auto)    7.0


 


Eos % (Auto)    1.2


 


Baso % (Auto)    0.7


 


Neut # (Auto)    7.3 H


 


Lymph # (Auto)    3.3


 


Mono # (Auto)    0.8


 


Eos # (Auto)    0.1


 


Baso # (Auto)    0.1


 


PT   


 


INR   


 


APTT   


 


Sodium   139 


 


Potassium   4.1 


 


Chloride   94 L 


 


Carbon Dioxide   33 H 


 


Anion Gap   16 


 


BUN   28 H 


 


Creatinine   0.8 


 


Est GFR ( Amer)   > 60 


 


Est GFR (Non-Af Amer)   > 60 


 


POC Glucose (mg/dL)  202 H  


 


Random Glucose   204 H 


 


Calcium   9.2 


 


Total Bilirubin   0.9 


 


AST   44 H D 


 


ALT   51 


 


Alkaline Phosphatase   97 


 


Troponin I   < 0.0120 


 


NT-Pro-B Natriuret Pep   86.3 


 


Total Protein   7.0 


 


Albumin   3.7 


 


Globulin   3.3 


 


Albumin/Globulin Ratio   1.1 














  04/19/18 04/20/18 04/20/18





  18:20 02:17 05:00


 


WBC    9.3


 


RBC    4.81


 


Hgb    14.5  D


 


Hct    42.9


 


MCV    89.2


 


MCH    30.2


 


MCHC    33.9


 


RDW    13.0


 


Plt Count    285


 


MPV    7.8


 


Neut % (Auto)    50.8


 


Lymph % (Auto)    39.5


 


Mono % (Auto)    5.9


 


Eos % (Auto)    3.2


 


Baso % (Auto)    0.6


 


Neut # (Auto)    4.7


 


Lymph # (Auto)    3.7


 


Mono # (Auto)    0.6


 


Eos # (Auto)    0.3


 


Baso # (Auto)    0.1


 


PT  10.4  


 


INR  0.9  


 


APTT  26.3  


 


Sodium   138 


 


Potassium   4.0 


 


Chloride   98 


 


Carbon Dioxide   28 


 


Anion Gap   16 


 


BUN   27 H 


 


Creatinine   0.7 


 


Est GFR ( Amer)   > 60 


 


Est GFR (Non-Af Amer)   > 60 


 


POC Glucose (mg/dL)   


 


Random Glucose   184 H 


 


Calcium   8.3 L 


 


Total Bilirubin   0.8 


 


AST   29 


 


ALT   34 


 


Alkaline Phosphatase   73 


 


Troponin I   < 0.0120 


 


NT-Pro-B Natriuret Pep   


 


Total Protein   5.7 L 


 


Albumin   2.9 L D 


 


Globulin   2.8 


 


Albumin/Globulin Ratio   1.0 














  04/20/18 04/20/18





  05:19 10:24


 


WBC  


 


RBC  


 


Hgb  


 


Hct  


 


MCV  


 


MCH  


 


MCHC  


 


RDW  


 


Plt Count  


 


MPV  


 


Neut % (Auto)  


 


Lymph % (Auto)  


 


Mono % (Auto)  


 


Eos % (Auto)  


 


Baso % (Auto)  


 


Neut # (Auto)  


 


Lymph # (Auto)  


 


Mono # (Auto)  


 


Eos # (Auto)  


 


Baso # (Auto)  


 


PT  


 


INR  


 


APTT  


 


Sodium  


 


Potassium  


 


Chloride  


 


Carbon Dioxide  


 


Anion Gap  


 


BUN  


 


Creatinine  


 


Est GFR ( Amer)  


 


Est GFR (Non-Af Amer)  


 


POC Glucose (mg/dL)  165 H 


 


Random Glucose  


 


Calcium  


 


Total Bilirubin  


 


AST  


 


ALT  


 


Alkaline Phosphatase  


 


Troponin I   < 0.0120


 


NT-Pro-B Natriuret Pep  


 


Total Protein  


 


Albumin  


 


Globulin  


 


Albumin/Globulin Ratio

## 2018-04-21 VITALS
RESPIRATION RATE: 16 BRPM | OXYGEN SATURATION: 98 % | HEART RATE: 55 BPM | DIASTOLIC BLOOD PRESSURE: 77 MMHG | SYSTOLIC BLOOD PRESSURE: 126 MMHG | TEMPERATURE: 98.3 F

## 2018-04-21 LAB
BUN SERPL-MCNC: 21 MG/DL (ref 7–17)
CALCIUM SERPL-MCNC: 8.8 MG/DL (ref 8.4–10.2)
ERYTHROCYTE [DISTWIDTH] IN BLOOD BY AUTOMATED COUNT: 12.7 % (ref 11.5–14.5)
GFR NON-AFRICAN AMERICAN: > 60
HGB BLD-MCNC: 14.6 G/DL (ref 12–16)
MCH RBC QN AUTO: 30 PG (ref 27–31)
MCHC RBC AUTO-ENTMCNC: 33.9 G/DL (ref 33–37)
MCV RBC AUTO: 88.4 FL (ref 81–99)
PLATELET # BLD: 308 K/UL (ref 130–400)
RBC # BLD AUTO: 4.87 MIL/UL (ref 3.8–5.2)
WBC # BLD AUTO: 11.9 K/UL (ref 4.8–10.8)

## 2018-04-21 RX ADMIN — ENOXAPARIN SODIUM SCH MG: 40 INJECTION SUBCUTANEOUS at 09:15

## 2018-04-21 NOTE — CARD
--------------- APPROVED REPORT --------------





EKG Measurement

Heart [id number]

OK [id number]

[id number]

[id number]

[id number]



<Conclusion>

Sinus bradycardia with short OK

Possible Left atrial enlargement

Borderline ECG

## 2018-04-21 NOTE — CP.PCM.DIS
<Jolie Bermeo - Last Filed: 04/21/18 14:02>





Provider





- Provider


Date of Admission: 


04/19/18 19:18





Attending physician: 


Estella Sam MD








Hospital Course





- Lab Results


Lab Results: 


 Most Recent Lab Values











WBC  11.9 K/uL (4.8-10.8)  H  04/21/18  06:15    


 


RBC  4.87 Mil/uL (3.80-5.20)   04/21/18  06:15    


 


Hgb  14.6 g/dL (12.0-16.0)   04/21/18  06:15    


 


Hct  43.0 % (34.0-47.0)   04/21/18  06:15    


 


MCV  88.4 fl (81.0-99.0)   04/21/18  06:15    


 


MCH  30.0 pg (27.0-31.0)   04/21/18  06:15    


 


MCHC  33.9 g/dL (33.0-37.0)   04/21/18  06:15    


 


RDW  12.7 % (11.5-14.5)   04/21/18  06:15    


 


Plt Count  308 K/uL (130-400)   04/21/18  06:15    


 


MPV  7.8 fl (7.2-11.7)   04/20/18  05:00    


 


Neut % (Auto)  50.8 % (50.0-75.0)   04/20/18  05:00    


 


Lymph % (Auto)  39.5 % (20.0-40.0)   04/20/18  05:00    


 


Mono % (Auto)  5.9 % (0.0-10.0)   04/20/18  05:00    


 


Eos % (Auto)  3.2 % (0.0-4.0)   04/20/18  05:00    


 


Baso % (Auto)  0.6 % (0.0-2.0)   04/20/18  05:00    


 


Neut # (Auto)  4.7 K/uL (1.8-7.0)   04/20/18  05:00    


 


Lymph # (Auto)  3.7 K/uL (1.0-4.3)   04/20/18  05:00    


 


Mono # (Auto)  0.6 K/uL (0.0-0.8)   04/20/18  05:00    


 


Eos # (Auto)  0.3 K/uL (0.0-0.7)   04/20/18  05:00    


 


Baso # (Auto)  0.1 K/uL (0.0-0.2)   04/20/18  05:00    


 


PT  10.4 Seconds (9.8-13.1)   04/19/18  18:20    


 


INR  0.9  (0.9-1.2)   04/19/18  18:20    


 


APTT  26.3 Seconds (25.6-37.1)   04/19/18  18:20    


 


Sodium  138 mmol/l (132-148)   04/21/18  06:15    


 


Potassium  4.0 MMOL/L (3.6-5.0)   04/21/18  06:15    


 


Chloride  96 mmol/L ()  L  04/21/18  06:15    


 


Carbon Dioxide  31 mmol/L (22-30)  H  04/21/18  06:15    


 


Anion Gap  15  (10-20)   04/21/18  06:15    


 


BUN  21 mg/dl (7-17)  H  04/21/18  06:15    


 


Creatinine  0.7 mg/dl (0.7-1.2)   04/21/18  06:15    


 


Est GFR ( Amer)  > 60   04/21/18  06:15    


 


Est GFR (Non-Af Amer)  > 60   04/21/18  06:15    


 


POC Glucose (mg/dL)  214 mg/dL ()  H  04/21/18  10:45    


 


Random Glucose  220 mg/dL ()  H  04/21/18  06:15    


 


Calcium  8.8 mg/dL (8.4-10.2)   04/21/18  06:15    


 


Total Bilirubin  0.8 mg/dl (0.2-1.3)   04/20/18  02:17    


 


AST  29 U/L (14-36)   04/20/18  02:17    


 


ALT  34 U/L (9-52)   04/20/18  02:17    


 


Alkaline Phosphatase  73 U/L ()   04/20/18  02:17    


 


Troponin I  < 0.0120 ng/mL (0.00-0.120)   04/20/18  10:24    


 


NT-Pro-B Natriuret Pep  86.3 pg/ml (0-900)   04/19/18  18:20    


 


Total Protein  5.7 G/DL (6.3-8.2)  L  04/20/18  02:17    


 


Albumin  2.9 g/dL (3.5-5.0)  L D 04/20/18  02:17    


 


Globulin  2.8 gm/dL (2.2-3.9)   04/20/18  02:17    


 


Albumin/Globulin Ratio  1.0  (1.0-2.1)   04/20/18  02:17    














- Hospital Course


Hospital Course: 











Acetaminophen [Tylenol 325mg tab] 650 mg PO Q6 PRN #30 tab


 PRN Reason: Pain, Mild (1-3)


Albuterol HFA [Ventolin HFA 90 mcg/actuation (8 g)] 90 mcg PO PRN PRN 30 Days  

inhaler


 PRN Reason: Shortness Of Breath


Fluticasone/Salmeterol 500/50 [Advair Diskus 500/50] 1 puff IH Q12 #1 unit


hydroCHLOROthiazide [Microzide] 12.5 mg PO DAILY #30 cap


Losartan [Cozaar] 25 mg PO DAILY #30 tab


metFORMIN [glucOPHAGE] 500 mg PO BID #30 tab


Methylprednisolone [Medrol Dose Pack (21 tabs)] 4 mg PO DAILY #21 mg


Montelukast [Singulair] 10 mg PO HS 30 Days #30 tab




















Discharge Plan





- Follow Up Plan


Condition: FAIR


Disposition: HOME/ ROUTINE


Instructions:  COPD Including Emphysema (DC), Hyperglycemia, Adult (DC), 

Exacerbation of COPD (DC)


Additional Instructions: 


See primary Doctor for follow up in one week


Referrals: 


[facility] [Outside]





<Roosevelt Roman - Last Filed: 04/22/18 21:58>





Provider





- Provider


Date of Admission: 


04/19/18 19:18





Attending physician: 


Estella Sam MD





Time Spent in preparation of Discharge (in minutes): 30





Diagnosis





- Discharge Diagnosis


(1) Atypical chest pain


Status: Acute   





Hospital Course





- Lab Results


Lab Results: 


 Most Recent Lab Values











WBC  11.9 K/uL (4.8-10.8)  H  04/21/18  06:15    


 


RBC  4.87 Mil/uL (3.80-5.20)   04/21/18  06:15    


 


Hgb  14.6 g/dL (12.0-16.0)   04/21/18  06:15    


 


Hct  43.0 % (34.0-47.0)   04/21/18  06:15    


 


MCV  88.4 fl (81.0-99.0)   04/21/18  06:15    


 


MCH  30.0 pg (27.0-31.0)   04/21/18  06:15    


 


MCHC  33.9 g/dL (33.0-37.0)   04/21/18  06:15    


 


RDW  12.7 % (11.5-14.5)   04/21/18  06:15    


 


Plt Count  308 K/uL (130-400)   04/21/18  06:15    


 


MPV  7.8 fl (7.2-11.7)   04/20/18  05:00    


 


Neut % (Auto)  50.8 % (50.0-75.0)   04/20/18  05:00    


 


Lymph % (Auto)  39.5 % (20.0-40.0)   04/20/18  05:00    


 


Mono % (Auto)  5.9 % (0.0-10.0)   04/20/18  05:00    


 


Eos % (Auto)  3.2 % (0.0-4.0)   04/20/18  05:00    


 


Baso % (Auto)  0.6 % (0.0-2.0)   04/20/18  05:00    


 


Neut # (Auto)  4.7 K/uL (1.8-7.0)   04/20/18  05:00    


 


Lymph # (Auto)  3.7 K/uL (1.0-4.3)   04/20/18  05:00    


 


Mono # (Auto)  0.6 K/uL (0.0-0.8)   04/20/18  05:00    


 


Eos # (Auto)  0.3 K/uL (0.0-0.7)   04/20/18  05:00    


 


Baso # (Auto)  0.1 K/uL (0.0-0.2)   04/20/18  05:00    


 


PT  10.4 Seconds (9.8-13.1)   04/19/18  18:20    


 


INR  0.9  (0.9-1.2)   04/19/18  18:20    


 


APTT  26.3 Seconds (25.6-37.1)   04/19/18  18:20    


 


Sodium  138 mmol/l (132-148)   04/21/18  06:15    


 


Potassium  4.0 MMOL/L (3.6-5.0)   04/21/18  06:15    


 


Chloride  96 mmol/L ()  L  04/21/18  06:15    


 


Carbon Dioxide  31 mmol/L (22-30)  H  04/21/18  06:15    


 


Anion Gap  15  (10-20)   04/21/18  06:15    


 


BUN  21 mg/dl (7-17)  H  04/21/18  06:15    


 


Creatinine  0.7 mg/dl (0.7-1.2)   04/21/18  06:15    


 


Est GFR ( Amer)  > 60   04/21/18  06:15    


 


Est GFR (Non-Af Amer)  > 60   04/21/18  06:15    


 


POC Glucose (mg/dL)  218 mg/dL ()  H  04/21/18  05:40    


 


Random Glucose  220 mg/dL ()  H  04/21/18  06:15    


 


Calcium  8.8 mg/dL (8.4-10.2)   04/21/18  06:15    


 


Total Bilirubin  0.8 mg/dl (0.2-1.3)   04/20/18  02:17    


 


AST  29 U/L (14-36)   04/20/18  02:17    


 


ALT  34 U/L (9-52)   04/20/18  02:17    


 


Alkaline Phosphatase  73 U/L ()   04/20/18  02:17    


 


Troponin I  < 0.0120 ng/mL (0.00-0.120)   04/20/18  10:24    


 


NT-Pro-B Natriuret Pep  86.3 pg/ml (0-900)   04/19/18  18:20    


 


Total Protein  5.7 G/DL (6.3-8.2)  L  04/20/18  02:17    


 


Albumin  2.9 g/dL (3.5-5.0)  L D 04/20/18  02:17    


 


Globulin  2.8 gm/dL (2.2-3.9)   04/20/18  02:17    


 


Albumin/Globulin Ratio  1.0  (1.0-2.1)   04/20/18  02:17    














- Hospital Course


Hospital Course: 











63 y/o F with PMHx of HTN, DM type 2, Stomach Ulcer, Gastritis, Asthma admitted 

for chest pain rule out ACS. Pt. hospital course uneventful with normal EKG and 

Troponins. Pt. seen by Cardiology Dr. Savanah joseph hospital course and 

recommedation include aspirin 81mg and exercise stress test as outpatient. Pt. 

to follow up at [facility] within one week email sent to Yolanda Perez. 








Medications on Discharge  


Acetaminophen [Tylenol 325mg tab] 650 mg PO Q6 PRN #30 tab


 PRN Reason: Pain, Mild (1-3)


Albuterol HFA [Ventolin HFA 90 mcg/actuation (8 g)] 90 mcg PO PRN PRN 30 Days  

inhaler


 PRN Reason: Shortness Of Breath


Fluticasone/Salmeterol 500/50 [Advair Diskus 500/50] 1 puff IH Q12 #1 unit


hydroCHLOROthiazide [Microzide] 12.5 mg PO DAILY #30 cap


Losartan [Cozaar] 25 mg PO DAILY #30 tab


metFORMIN [glucOPHAGE] 500 mg PO BID #30 tab


Methylprednisolone [Medrol Dose Pack (21 tabs)] 4 mg PO DAILY #21 mg


Montelukast [Singulair] 10 mg PO HS 30 Days #30 tab


Aspirin 81mg





- 



































Discharge Exam





- Head Exam


Head Exam: ATRAUMATIC, NORMOCEPHALIC





- Eye Exam


Eye Exam: Normal appearance





- ENT Exam


ENT Exam: Mucous Membranes Moist





- Neck Exam


Neck exam: Normal Inspection





- Respiratory Exam


Respiratory Exam: Clear to PA & Lateral, NORMAL BREATHING PATTERN





- Cardiovascular Exam


Cardiovascular Exam: REGULAR RHYTHM, +S1, +S2





- GI/Abdominal Exam


GI & Abdominal Exam: Soft.  absent: Tenderness





- Extremities Exam


Extremities exam: normal inspection, pedal pulses present





- Neurological Exam


Neurological exam: Alert, CN II-XII Intact, Oriented x3





- Psychiatric Exam


Psychiatric exam: Normal Affect, Normal Mood





Discharge Plan





- Follow Up Plan


Patient education suggested?: Yes

## 2019-03-22 ENCOUNTER — HOSPITAL ENCOUNTER (INPATIENT)
Dept: HOSPITAL 14 - H.ER | Age: 64
LOS: 6 days | Discharge: HOME | DRG: 202 | End: 2019-03-28
Attending: FAMILY MEDICINE | Admitting: FAMILY MEDICINE
Payer: MEDICARE

## 2019-03-22 VITALS — BODY MASS INDEX: 33.5 KG/M2

## 2019-03-22 DIAGNOSIS — Z82.49: ICD-10-CM

## 2019-03-22 DIAGNOSIS — F41.9: ICD-10-CM

## 2019-03-22 DIAGNOSIS — E11.65: ICD-10-CM

## 2019-03-22 DIAGNOSIS — J01.00: ICD-10-CM

## 2019-03-22 DIAGNOSIS — K57.92: ICD-10-CM

## 2019-03-22 DIAGNOSIS — E66.9: ICD-10-CM

## 2019-03-22 DIAGNOSIS — K76.0: ICD-10-CM

## 2019-03-22 DIAGNOSIS — Z90.710: ICD-10-CM

## 2019-03-22 DIAGNOSIS — K29.70: ICD-10-CM

## 2019-03-22 DIAGNOSIS — M94.0: ICD-10-CM

## 2019-03-22 DIAGNOSIS — E78.5: ICD-10-CM

## 2019-03-22 DIAGNOSIS — Z82.5: ICD-10-CM

## 2019-03-22 DIAGNOSIS — Z79.82: ICD-10-CM

## 2019-03-22 DIAGNOSIS — J42: ICD-10-CM

## 2019-03-22 DIAGNOSIS — J45.51: Primary | ICD-10-CM

## 2019-03-22 DIAGNOSIS — I10: ICD-10-CM

## 2019-03-22 DIAGNOSIS — Z87.01: ICD-10-CM

## 2019-03-22 DIAGNOSIS — E78.00: ICD-10-CM

## 2019-03-22 DIAGNOSIS — F32.9: ICD-10-CM

## 2019-03-22 DIAGNOSIS — Z23: ICD-10-CM

## 2019-03-22 DIAGNOSIS — K25.9: ICD-10-CM

## 2019-03-22 DIAGNOSIS — Z87.11: ICD-10-CM

## 2019-03-22 DIAGNOSIS — Z83.3: ICD-10-CM

## 2019-03-22 LAB
ALBUMIN SERPL-MCNC: 4.5 G/DL (ref 3.5–5)
ALBUMIN/GLOB SERPL: 1.3 {RATIO} (ref 1–2.1)
ALT SERPL-CCNC: 40 U/L (ref 9–52)
AST SERPL-CCNC: 38 U/L (ref 14–36)
BASOPHILS # BLD AUTO: 0.1 K/UL (ref 0–0.2)
BASOPHILS NFR BLD: 0.7 % (ref 0–2)
BUN SERPL-MCNC: 20 MG/DL (ref 7–17)
CALCIUM SERPL-MCNC: 10.3 MG/DL (ref 8.4–10.2)
EOSINOPHIL # BLD AUTO: 0.9 K/UL (ref 0–0.7)
EOSINOPHIL NFR BLD: 9.2 % (ref 0–4)
ERYTHROCYTE [DISTWIDTH] IN BLOOD BY AUTOMATED COUNT: 13.4 % (ref 11.5–14.5)
GFR NON-AFRICAN AMERICAN: > 60
HGB BLD-MCNC: 14.9 G/DL (ref 12–16)
LYMPHOCYTES # BLD AUTO: 3.2 K/UL (ref 1–4.3)
LYMPHOCYTES NFR BLD AUTO: 31.3 % (ref 20–40)
MCH RBC QN AUTO: 30.2 PG (ref 27–31)
MCHC RBC AUTO-ENTMCNC: 33.8 G/DL (ref 33–37)
MCV RBC AUTO: 89.4 FL (ref 81–99)
MONOCYTES # BLD: 0.7 K/UL (ref 0–0.8)
MONOCYTES NFR BLD: 6.9 % (ref 0–10)
NEUTROPHILS # BLD: 5.3 K/UL (ref 1.8–7)
NEUTROPHILS NFR BLD AUTO: 51.9 % (ref 50–75)
NRBC BLD AUTO-RTO: 0.1 % (ref 0–0)
PLATELET # BLD: 355 K/UL (ref 130–400)
PMV BLD AUTO: 8.2 FL (ref 7.2–11.7)
RBC # BLD AUTO: 4.94 MIL/UL (ref 3.8–5.2)
WBC # BLD AUTO: 10.2 K/UL (ref 4.8–10.8)

## 2019-03-22 PROCEDURE — 3E0234Z INTRODUCTION OF SERUM, TOXOID AND VACCINE INTO MUSCLE, PERCUTANEOUS APPROACH: ICD-10-PCS | Performed by: FAMILY MEDICINE

## 2019-03-22 RX ADMIN — IPRATROPIUM BROMIDE AND ALBUTEROL SULFATE SCH ML: .5; 3 SOLUTION RESPIRATORY (INHALATION) at 23:17

## 2019-03-22 NOTE — CP.PCM.HP
History of Present Illness





- History of Present Illness


History of Present Illness: 





64 y/o F with a PMHx of HTN, DM2, obesity, HLD, gastric ulcer and asthma was 

brought by EMS from allergist office due to refractory SOB and wheezing. Pt was 

administered with 2 doses of epinephrine and Solu-Medrol with NO improvement. Pt

explains her asthma has aggravated for the last 2 months, pt is having SOB, non-

productive cough and wheezing every day, gets tired easily with walking and 

talking fast. Chest pain presente intermittently and exacerbates with coughing 

and deep inspiration. 


--Pt traveled to her country, [place], a few months ago. NO ill contacts 

reported. Pt denies hemoptysis, sweats, weight changes, nausea, vomiting, 

diarrhea, rash or peripheral edema. 


--Pt has visited her PCP a few times in the last month who has added medications

such as Prednisone, Singulair and Ipratropium with NO improvement. Finally, pt 

was referred to Allergy and Pulmonology. Pt has appt with pulmonologist in 3 

days. 


--Pt had a CT Chest on 19: Stable sub-pleural nodule less than 3 mm apex 

left lung.  No change. Calcified granuloma right upper lobe 4 mm. Parabronchial 

thickening compatible with lower airway disease/bronchitis. No acute or 

significant findings related to/ accounting  for the clinical presentation.





PCP: Dr Harper





Allergy: Iodine


Meds: as EMR





PMHx: HTN, DM2, obesity, HLD, gastric ulcer, Bipolar disorder, breast mass and 

asthma


PSHx: Total hysterectomy, catheterization 16 years ago due to severe chest pain 

but no MI reported. 


FHx: Brother has asthma. Mother , had DM2, HTN and MI. 


SHx: Never smoker, no alcohol and no rec drugs. 








ED Course: 


--VS were WNL except for elevated /99.


--CBC showed eosinophilia. CMP with mild hypercalcemia. 


--CXR read by me: no consolidation, cardiomegaly, R costophernic angle not 

visible.


--Duoneb x1, Solu-Medrol, Famotidine and Magnessium administered. 








Present on Admission





- Present on Admission


Any Indicators Present on Admission: No





Review of Systems





- Constitutional


Constitutional: absent: Chills, Fever, Weight Loss





- EENT


Nose/Mouth/Throat: absent: Epistaxis, Nasal Congestion, Dysphagia, Tongue 

Swelling, Neck Mass





- Cardiovascular


Cardiovascular: absent: Chest Pain, Claudication, Dyspnea





- Respiratory


Respiratory: absent: Cough, Dyspnea, Hemoptysis





- Gastrointestinal


Gastrointestinal: absent: Abdominal Pain, Constipation, Nausea, Vomiting





Past Patient History





- Infectious Disease


Hx of Infectious Diseases: None





- Past Medical History & Family History


Past Medical History?: Yes





- Past Social History


Smoking Status: Never Smoked





- CARDIAC


Hx Hypercholesterolemia: Yes


Hx Hypertension: Yes





- PULMONARY


Hx Asthma: Yes


Hx Bronchitis: Yes





- NEUROLOGICAL


Hx Neurological Disorder: No





- HEENT


Hx HEENT Problems: No





- RENAL


Hx Chronic Kidney Disease: No





- ENDOCRINE/METABOLIC


Hx Endocrine Disorders: Yes





- HEMATOLOGICAL/ONCOLOGICAL


Hx Human Immunodeficiency Virus (HIV): No





- INTEGUMENTARY


Hx Dermatological Problems: No





- MUSCULOSKELETAL/RHEUMATOLOGICAL


Hx Musculoskeletal Disorders: Yes





- GASTROINTESTINAL


Hx Diverticulitis: Yes


Hx Gastritis: Yes





- GENITOURINARY/GYNECOLOGICAL


Hx Genitourinary Disorders: No





- PSYCHIATRIC


Hx Anxiety: Yes


Hx Bipolar Disorder: Yes


Hx Depression: Yes





- SURGICAL HISTORY


Hx Cardiac Catheterization: Yes





- ANESTHESIA


Hx Anesthesia: Yes


Hx Anesthesia Reactions: No


Hx Malignant Hyperthermia: No





Meds


Allergies/Adverse Reactions: 


                                    Allergies











Allergy/AdvReac Type Severity Reaction Status Date / Time


 


iodine Allergy  RASH Verified 19 19:32














Physical Exam





- Constitutional


Appears: Well, No Acute Distress





- Head Exam


Head Exam: ATRAUMATIC, NORMAL INSPECTION





- Eye Exam


Eye Exam: EOMI, Normal appearance





- ENT Exam


ENT Exam: Mucous Membranes Moist





- Neck Exam


Neck exam: Positive for: Full Rom, Normal Inspection.  Negative for: 

Meningismus, Tenderness





- Respiratory Exam


Respiratory Exam: Rhonchi, Wheezes.  absent: Rales


Additional comments: 


tachypneic, able to talk in full sentences but get tired with long conversation.







- Cardiovascular Exam


Cardiovascular Exam: REGULAR RHYTHM, +S1, +S2





- GI/Abdominal Exam


GI & Abdominal Exam: Soft.  absent: Firm, Rebound, Tenderness





- Extremities Exam


Extremities exam: Positive for: full ROM, normal inspection.  Negative for: calf

tenderness, pedal edema, tenderness





- Neurological Exam


Neurological exam: Alert, Oriented x3





Results





- Vital Signs


Recent Vital Signs: 





                                Last Vital Signs











Temp  98.8 F   19 21:48


 


Pulse  78   19 21:48


 


Resp  17   19 21:48


 


BP  134/68   19 21:48


 


Pulse Ox  97   19 21:56














- Labs


Result Diagrams: 


                                 19 20:03





                                 19 20:03


Labs: 





                         Laboratory Results - last 24 hr











  19





  20:03 20:03


 


WBC  10.2 


 


RBC  4.94 


 


Hgb  14.9 


 


Hct  44.1 


 


MCV  89.4 


 


MCH  30.2 


 


MCHC  33.8 


 


RDW  13.4 


 


Plt Count  355 


 


MPV  8.2 


 


Neut % (Auto)  51.9 


 


Lymph % (Auto)  31.3 


 


Mono % (Auto)  6.9 


 


Eos % (Auto)  9.2 H 


 


Baso % (Auto)  0.7 


 


Neut # (Auto)  5.3 


 


Lymph # (Auto)  3.2 


 


Mono # (Auto)  0.7 


 


Eos # (Auto)  0.9 H 


 


Baso # (Auto)  0.1 


 


Sodium   137


 


Potassium   4.0


 


Chloride   100


 


Carbon Dioxide   27


 


Anion Gap   14


 


BUN   20 H


 


Creatinine   0.7


 


Est GFR ( Amer)   > 60


 


Est GFR (Non-Af Amer)   > 60


 


Random Glucose   213 H


 


Calcium   10.3 H


 


Total Bilirubin   0.5


 


AST   38 H


 


ALT   40


 


Alkaline Phosphatase   129 H


 


Total Protein   8.1


 


Albumin   4.5


 


Globulin   3.6


 


Albumin/Globulin Ratio   1.3














Assessment & Plan





- Assessment and Plan (Free Text)


Assessment: 





64 y/o F with a PMHx of HTN, DM2, obesity, HLD, gastric ulcer and asthma is 

admitted for evaluation and mangement of  refractory asthma exacerbation, SOB 

and wheezing. Pt administered 2 doses of Epinephrine with NO control.


--CXR read by me: no consolidation, cardiomegaly, R costophernic angle not 

visible.


--CT Chest on 19: Stable sub-pleural nodule less than 3 mm apex left lung.

 No change. Calcified granuloma right upper lobe 4 mm. Parabronchial thickening 

compatible with lower airway disease/bronchitis. No acute or significant 

findings related to/ accounting  for the clinical presentation.





PLAN:





>Dyspnea and Wheezing


--Afebrile, eosinophilia, no leukocytosis, VSS. 


--Possibly: Reractory asthma exacerbation, ABPA, fungal infection, Kamaljit's 

pneumonia


--Duonebs Q4H


--Solu-medrol 40mg Q8H


--Ordered pro-BNP as possible cardiomegaly on CXR.


--F/U aspergillus screen, TB Quant, Fungitel, Sputum culture, pro-calcitonin, 

Urine Ag for strep pneumo and legionella.  





>Asthma, uncontrolled


--Home meds resumed





 >HTN 


--Home meds resumed


--Monitor vital signs





>DM2 


--Home meds resumed


--On steroids therapy


--ISS and hypoglycemia protocol





>HLD


--Home meds resumed





>Hx of gastric ulcer


--IV Pantoprazole resumed





>DVT Prophylaxis


--SCDs


--Lovenox 40mg SC daily





Case discussed with Dr Leroy Noguera PGY-2








- Date & Time


Date: 19


Time: 21:55

## 2019-03-22 NOTE — ED PDOC
HPI: Asthma





<Pino Lundberg - Last Filed: 03/22/19 21:56>


Chief Complaint (Provider): Shortness Of Breath


History Per: Patient


History/Exam Limitations: no limitations


Onset/Duration Of Symptoms: Days (2X weeks )


Severity: Moderate


Additional Complaint(s): 





63 year old female with a past medical history of asthma presents to the ED with

shortness of breath onset 2 weeks. Patients PMD is Dr. Harper from the clinic. 

He referred the patient to an allergist today. The allergist saw that she was in

respiratory distress and called the EMS from there. EMS gave her 2 doses of epi 

and in the ED she had mild respiratory distress. Patient  states that her asthma

worsened in the past week despite taking her medication Patient denies fever, 

nausea and vomiting. 


PMD: Abe Harper MD





<Shakira Almendarez - Last Filed: 03/22/19 22:52>


Time Seen by Provider: 03/22/19 19:39


Chief Complaint (Nursing): Shortness Of Breath





Past Medical History


Vital Signs: 





                                Last Vital Signs











Temp  98.8 F   03/22/19 21:48


 


Pulse  78   03/22/19 21:48


 


Resp  17   03/22/19 21:48


 


BP  134/68   03/22/19 21:48


 


Pulse Ox  97   03/22/19 21:48














- Medical History


PMH: Anxiety, Asthma, Bipolar Disorder, Bronchitis, Depression, Diabetes, 

Diverticulitis, Gastritis, HTN, Hypercholesterolemia


   Denies: HIV, Chronic Kidney Disease





- Family History


Family History: States: Unknown Family Hx, Diabetes, Hypertension





<Pino Lundberg - Last Filed: 03/22/19 21:56>


Vital Signs: 





                                Last Vital Signs











Temp  98.8 F   03/22/19 21:48


 


Pulse  78   03/22/19 21:48


 


Resp  17   03/22/19 21:48


 


BP  134/68   03/22/19 21:48


 


Pulse Ox  97   03/22/19 21:56














<Shakira Almendarez - Last Filed: 03/22/19 22:52>





- Home Medications


Home Medications: 


                                Ambulatory Orders











 Medication  Instructions  Recorded


 


Fluticasone/Salmeterol 500/50 1 puff IH Q12 #1 unit 04/18/18





[Advair Diskus 500/50]  


 


Losartan [Cozaar] 25 mg PO DAILY #30 tab 04/18/18


 


Methylprednisolone [Medrol Dose 4 mg PO DAILY #21 mg 04/18/18





Pack (21 tabs)]  


 


Montelukast [Singulair] 10 mg PO HS 30 Days #30 tab 04/18/18


 


hydroCHLOROthiazide [Microzide] 12.5 mg PO DAILY #30 cap 04/18/18


 


metFORMIN [glucOPHAGE] 500 mg PO BID #30 tab 04/18/18


 


Albuterol HFA [Ventolin HFA 90 2 puff IH Q6 PRN 04/19/18





mcg/actuation (8 g)]  


 


Atorvastatin [Lipitor] 20 mg PO DAILY 04/19/18


 


Omeprazole 40 mg PO BID 04/19/18


 


Acetaminophen [Tylenol 325mg tab] 650 mg PO Q6 PRN  tab 04/21/18


 


Aspirin [Ecotrin] 81 mg PO DAILY  tabec 04/21/18














- Allergies


Allergies/Adverse Reactions: 


                                    Allergies











Allergy/AdvReac Type Severity Reaction Status Date / Time


 


iodine Allergy  RASH Verified 03/22/19 19:32














- Laboratory Results


Result Diagrams: 


                                 03/22/19 20:03





                                 03/22/19 20:03


Lab Results: 





                                        











Total Bilirubin  0.5 mg/dl (0.2-1.3)   03/22/19  20:03    


 


AST  38 U/L (14-36)  H  03/22/19  20:03    


 


ALT  40 U/L (9-52)   03/22/19  20:03    


 


Alkaline Phosphatase  129 U/L ()  H  03/22/19  20:03    


 


Total Protein  8.1 G/DL (6.3-8.2)   03/22/19  20:03    


 


Albumin  4.5 g/dL (3.5-5.0)   03/22/19  20:03    


 


Globulin  3.6 gm/dL (2.2-3.9)   03/22/19  20:03    


 


Albumin/Globulin Ratio  1.3  (1.0-2.1)   03/22/19  20:03    














- ECG


O2 Sat by Pulse Oximetry: 97





<Pino Lundberg - Last Filed: 03/22/19 21:56>





- Laboratory Results


Result Diagrams: 


                                 03/22/19 20:03





                                 03/22/19 20:03


Lab Results: 





                                        











Total Bilirubin  0.5 mg/dl (0.2-1.3)   03/22/19  20:03    


 


AST  38 U/L (14-36)  H  03/22/19  20:03    


 


ALT  40 U/L (9-52)   03/22/19  20:03    


 


Alkaline Phosphatase  129 U/L ()  H  03/22/19  20:03    


 


Total Protein  8.1 G/DL (6.3-8.2)   03/22/19  20:03    


 


Albumin  4.5 g/dL (3.5-5.0)   03/22/19  20:03    


 


Globulin  3.6 gm/dL (2.2-3.9)   03/22/19  20:03    


 


Albumin/Globulin Ratio  1.3  (1.0-2.1)   03/22/19  20:03    














<Shakira Almendarez - Last Filed: 03/22/19 22:52>





Disposition





<Pino Lundberg - Last Filed: 03/22/19 21:56>





<Shakira Almendarez - Last Filed: 03/22/19 22:52>





- Disposition


Condition: IMPROVED

## 2019-03-22 NOTE — ED PDOC
HPI: Asthma


Time Seen by Provider: 19 19:39


Chief Complaint (Nursing): Shortness Of Breath


Chief Complaint (Provider): Shortness Of Breath


History Per: Patient


History/Exam Limitations: no limitations


Onset/Duration Of Symptoms: Days (2X weeks )


Current Symptoms Are (Timing): Still Present


Severity: Moderate


Additional Complaint(s): 





63 year old female with a past medical history of diabetes, hypertension, and 

asthma presents to the ED with shortness of breath onset 2 weeks. Patients PMD 

is Dr. Harper from the clinic. He referred the patient to an allergist today. 

The allergist saw that she was in respiratory distress and called the EMS from 

there. EMS gave her 2 doses of epi and solumedrol. She reports mild improvement 

but continues to be in respiratory distress. Patient  states that her asthma 

worsened in the past week despite taking her medication. Patient denies fever, 

nausea and vomiting. 


PMD: Abe Harper MD





Past Medical History


Reviewed: Historical Data, Nursing Documentation, Vital Signs


Vital Signs: 





                                Last Vital Signs











Temp  98 F   19 22:38


 


Pulse  80   19 22:38


 


Resp  18   19 22:38


 


BP  133/85   19 22:38


 


Pulse Ox  97   19 22:38











PATI Report Viewed: Yes





- Medical History


PMH: Anxiety, Asthma, Bipolar Disorder, Bronchitis, Depression, Diabetes, 

Diverticulitis, Gastritis, HTN, Hypercholesterolemia


   Denies: HIV, Chronic Kidney Disease





- Surgical History


Other surgeries: hysterectomy, left sided Oophorectomy





- Family History


Family History: States: No Known Family Hx, Unknown Family Hx, Diabetes, 

Hypertension





- Home Medications


Home Medications: 


                                Ambulatory Orders











 Medication  Instructions  Recorded


 


Fluticasone/Salmeterol 500/50 1 puff IH Q12 #1 unit 18





[Advair Diskus 500/50]  


 


Losartan [Cozaar] 25 mg PO DAILY #30 tab 18


 


Methylprednisolone [Medrol Dose 4 mg PO DAILY #21 mg 18





Pack (21 tabs)]  


 


Montelukast [Singulair] 10 mg PO HS 30 Days #30 tab 18


 


hydroCHLOROthiazide [Microzide] 12.5 mg PO DAILY #30 cap 18


 


metFORMIN [glucOPHAGE] 500 mg PO BID #30 tab 04/18/18


 


Albuterol HFA [Ventolin HFA 90 2 puff IH Q6 PRN 18





mcg/actuation (8 g)]  


 


Atorvastatin [Lipitor] 20 mg PO DAILY 18


 


Omeprazole 40 mg PO BID 18


 


Acetaminophen [Tylenol 325mg tab] 650 mg PO Q6 PRN  tab 18


 


Aspirin [Ecotrin] 81 mg PO DAILY  tabec 18














- Allergies


Allergies/Adverse Reactions: 


                                    Allergies











Allergy/AdvReac Type Severity Reaction Status Date / Time


 


iodine Allergy  RASH Verified 19 19:32














Review of Systems


ROS Statement: Except As Marked, All Systems Reviewed And Found Negative


Constitutional: Negative for: Fever


Respiratory: Positive for: Other (mild respiratory distress)


Gastrointestinal: Negative for: Nausea, Vomiting





Physical Exam





- Reviewed


Nursing Documentation Reviewed: Yes


Vital Signs Reviewed: Yes





- Physical Exam


Appears: Positive for: In Acute Distress


Skin: Positive for: Normal Color, Warm, DRY


Eye Exam: Positive for: EOMI, Normal appearance, PERRL


Neck: Positive for: Normal, Painless ROM


Cardiovascular/Chest: Positive for: Regular Rate, Rhythm


Respiratory: Positive for: Decreased Breath Sounds (decreased air entry ), 

Rhonchi (inspiratory rhonchi), Wheezing (bilaterally diffused expiratory), 

Respiratory Distress (moderate )


Gastrointestinal/Abdominal: Positive for: Normal Exam, Soft


Back: Positive for: Normal Inspection


Extremity: Positive for: Normal ROM (upper and lower)


Neurological/Psych: Positive for: Awake, Alert, Normal Tone.  Negative for: 

Motor/Sensory Deficits, Facial Droop





- Laboratory Results


Result Diagrams: 


                                 19 04:30





                                 19 04:30


Lab Results: 





                                        











Total Bilirubin  0.5 mg/dl (0.2-1.3)   19  20:03    


 


AST  38 U/L (14-36)  H  19  20:03    


 


ALT  40 U/L (9-52)   19  20:03    


 


Alkaline Phosphatase  129 U/L ()  H  19  20:03    


 


Total Protein  8.1 G/DL (6.3-8.2)   19  20:03    


 


Albumin  4.5 g/dL (3.5-5.0)   19  20:03    


 


Globulin  3.6 gm/dL (2.2-3.9)   19  20:03    


 


Albumin/Globulin Ratio  1.3  (1.0-2.1)   19  20:03    














- ECG


O2 Sat by Pulse Oximetry: 97 (RA)


Pulse Ox Interpretation: Normal





- Critical Care


Total Time (In Min): 60





Medical Decision Making


Medical Decision Makin:39 


Impression: Acute respiratory distress, asthma exacerbation. 





Plan: 


*  EKG


* CMP


* CBC


* CXR portable





21:23 


Labs reviewed show no clinically significant abnormalities


Chest xray NAD


Admitted for observation for asthma exacerbation, respiratory distress given 

severity of symptoms and  clinical presentation. 





--------------------------------------------------------------------

-----------------------------


Scribe Attestation:


Documented by Amena Pierce, acting as a scribe for Pino Lundberg MD





Provider Scribe Attestation:


All medical record entries made by the Scribe were at my direction and 

personally dictated by me. I have reviewed the chart and agree that the record 

accurately reflects my personal performance of the history, physical exam, 

medical decision making, and the department course for this patient. I have also

 personally directed, reviewed, and agree with the discharge instructions and 

disposition.





Disposition





- Clinical Impression


Clinical Impression: 


 Asthma exacerbation








- Patient ED Disposition


Is Patient to be Admitted: Yes





- Disposition


Disposition Time: 21:23


Condition: IMPROVED





- Pt Status Changed To:


Hospital Disposition Of: Observation

## 2019-03-23 LAB
BACTERIA #/AREA URNS HPF: (no result) /[HPF]
BASOPHILS # BLD AUTO: 0 K/UL (ref 0–0.2)
BASOPHILS NFR BLD: 0.6 % (ref 0–2)
BILIRUB UR-MCNC: NEGATIVE MG/DL
BNP SERPL-MCNC: 87.2 PG/ML (ref 0–900)
BUN SERPL-MCNC: 21 MG/DL (ref 7–17)
CALCIUM SERPL-MCNC: 9.4 MG/DL (ref 8.4–10.2)
COLOR UR: YELLOW
EOSINOPHIL # BLD AUTO: 0 K/UL (ref 0–0.7)
EOSINOPHIL NFR BLD: 0.1 % (ref 0–4)
ERYTHROCYTE [DISTWIDTH] IN BLOOD BY AUTOMATED COUNT: 13.3 % (ref 11.5–14.5)
GFR NON-AFRICAN AMERICAN: > 60
GLUCOSE UR STRIP-MCNC: 150 MG/DL
HGB BLD-MCNC: 13.8 G/DL (ref 12–16)
LEUKOCYTE ESTERASE UR-ACNC: (no result) LEU/UL
LYMPHOCYTES # BLD AUTO: 0.8 K/UL (ref 1–4.3)
LYMPHOCYTES NFR BLD AUTO: 11.2 % (ref 20–40)
MCH RBC QN AUTO: 29.9 PG (ref 27–31)
MCHC RBC AUTO-ENTMCNC: 33.4 G/DL (ref 33–37)
MCV RBC AUTO: 89.4 FL (ref 81–99)
MONOCYTES # BLD: 0.1 K/UL (ref 0–0.8)
MONOCYTES NFR BLD: 0.9 % (ref 0–10)
NEUTROPHILS # BLD: 6.5 K/UL (ref 1.8–7)
NEUTROPHILS NFR BLD AUTO: 87.2 % (ref 50–75)
NRBC BLD AUTO-RTO: 0.1 % (ref 0–0)
PH UR STRIP: 6 [PH] (ref 5–8)
PLATELET # BLD: 315 K/UL (ref 130–400)
PMV BLD AUTO: 8.5 FL (ref 7.2–11.7)
PROT UR STRIP-MCNC: NEGATIVE MG/DL
RBC # BLD AUTO: 4.6 MIL/UL (ref 3.8–5.2)
RBC # UR STRIP: NEGATIVE /UL
SP GR UR STRIP: 1.02 (ref 1–1.03)
SQUAMOUS EPITHIAL: < 1 /HPF (ref 0–5)
URINE CLARITY: CLEAR
UROBILINOGEN UR-MCNC: (no result) MG/DL (ref 0.2–1)
WBC # BLD AUTO: 7.4 K/UL (ref 4.8–10.8)

## 2019-03-23 RX ADMIN — IPRATROPIUM BROMIDE AND ALBUTEROL SULFATE SCH ML: .5; 3 SOLUTION RESPIRATORY (INHALATION) at 23:18

## 2019-03-23 RX ADMIN — IPRATROPIUM BROMIDE AND ALBUTEROL SULFATE SCH ML: .5; 3 SOLUTION RESPIRATORY (INHALATION) at 07:20

## 2019-03-23 RX ADMIN — IPRATROPIUM BROMIDE AND ALBUTEROL SULFATE SCH ML: .5; 3 SOLUTION RESPIRATORY (INHALATION) at 19:21

## 2019-03-23 RX ADMIN — FLUTICASONE PROPIONATE AND SALMETEROL SCH PUFF: 50; 500 POWDER RESPIRATORY (INHALATION) at 21:00

## 2019-03-23 RX ADMIN — INSULIN LISPRO SCH: 100 INJECTION, SOLUTION INTRAVENOUS; SUBCUTANEOUS at 00:33

## 2019-03-23 RX ADMIN — INSULIN LISPRO SCH UNITS: 100 INJECTION, SOLUTION INTRAVENOUS; SUBCUTANEOUS at 16:45

## 2019-03-23 RX ADMIN — METHYLPREDNISOLONE SODIUM SUCCINATE SCH MG: 40 INJECTION, POWDER, FOR SOLUTION INTRAMUSCULAR; INTRAVENOUS at 16:44

## 2019-03-23 RX ADMIN — INSULIN LISPRO SCH UNITS: 100 INJECTION, SOLUTION INTRAVENOUS; SUBCUTANEOUS at 08:45

## 2019-03-23 RX ADMIN — METHYLPREDNISOLONE SODIUM SUCCINATE SCH MG: 40 INJECTION, POWDER, FOR SOLUTION INTRAMUSCULAR; INTRAVENOUS at 00:39

## 2019-03-23 RX ADMIN — IPRATROPIUM BROMIDE AND ALBUTEROL SULFATE SCH ML: .5; 3 SOLUTION RESPIRATORY (INHALATION) at 15:00

## 2019-03-23 RX ADMIN — INSULIN LISPRO SCH UNITS: 100 INJECTION, SOLUTION INTRAVENOUS; SUBCUTANEOUS at 13:08

## 2019-03-23 RX ADMIN — FLUTICASONE PROPIONATE AND SALMETEROL SCH PUFF: 50; 500 POWDER RESPIRATORY (INHALATION) at 08:23

## 2019-03-23 RX ADMIN — IPRATROPIUM BROMIDE AND ALBUTEROL SULFATE SCH ML: .5; 3 SOLUTION RESPIRATORY (INHALATION) at 11:01

## 2019-03-23 RX ADMIN — IPRATROPIUM BROMIDE AND ALBUTEROL SULFATE SCH ML: .5; 3 SOLUTION RESPIRATORY (INHALATION) at 04:11

## 2019-03-23 RX ADMIN — METHYLPREDNISOLONE SODIUM SUCCINATE SCH MG: 40 INJECTION, POWDER, FOR SOLUTION INTRAMUSCULAR; INTRAVENOUS at 08:46

## 2019-03-23 NOTE — CP.PCM.PN
<Don Noonan - Last Filed: 03/23/19 10:54>





Subjective





- Date & Time of Evaluation


Date of Evaluation: 03/23/19


Time of Evaluation: 08:01





- Subjective


Subjective: 





Patient seen and examined this morning at bedside, states feeling better but 

noted mild dyspneic on conversation, speak in full sentences. Endorses mild CP 

while cough. No acute overnight events, VSS, afebrile. Supplemental O2 via NC at

1 lpm





Objective





- Vital Signs/Intake and Output


Vital Signs (last 24 hours): 


                                        











Temp Pulse Resp BP Pulse Ox


 


 97.7 F   74   18   115/69   94 L


 


 03/23/19 04:58  03/23/19 04:58  03/23/19 04:58  03/23/19 04:58  03/23/19 04:58











- Medications


Medications: 


                               Current Medications





Albuterol/Ipratropium (Duoneb 3 Mg/0.5 Mg (3 Ml) Ud)  3 ml INH RQ4 [facility]


   Last Admin: 03/23/19 07:20 Dose:  3 ml


Aspirin (Ecotrin)  81 mg PO DAILY [facility]


Atorvastatin Calcium (Lipitor)  20 mg PO DAILY [facility]


Dextrose (Dextrose 50% Inj)  0 ml IV STAT PRN; Protocol


   PRN Reason: Hypoglycemia Protocol


Dextrose (Glutose 15)  0 gm PO ONCE PRN; Protocol


   PRN Reason: Hypoglycemia Protocol


Glucagon (Glucagen Diagnostic Kit)  0 mg IM STAT PRN; Protocol


   PRN Reason: Hypoglycemia Protocol


Guaifenesin/Dextromethorphan (Robitussin Dm)  10 ml PO Q6 PRN


   PRN Reason: Cough


   Last Admin: 03/23/19 01:56 Dose:  10 ml


Hydrochlorothiazide (Microzide)  12.5 mg PO DAILY [facility]


Insulin Human Lispro (Humalog)  0 units SC ACCU-CHECK JUDY; Protocol


   Last Admin: 03/23/19 00:33 Dose:  Not Given


Losartan Potassium (Cozaar)  25 mg PO DAILY [facility]


Metformin HCl (Glucophage)  500 mg PO BID [facility]


Methylprednisolone (Solu-Medrol)  40 mg IVP Q8 [facility]


   Last Admin: 03/23/19 00:39 Dose:  40 mg


Montelukast Sodium (Singulair)  10 mg PO HS [facility]


Pantoprazole Sodium (Protonix Inj)  40 mg IVP DAILY [facility]


Fluticasone/Salmeterol (Advair Diskus 500/50)  1 puff IH Q12 [facility]











- Labs


Labs: 


                                        





                                 03/23/19 04:30 





                                 03/23/19 04:30 











- Constitutional


Appears: No Acute Distress





- Head Exam


Head Exam: NORMAL INSPECTION





- Eye Exam


Eye Exam: EOMI, PERRL





- Respiratory Exam


Respiratory Exam: Chest Wall Tenderness, Decreased Breath Sounds (b/l), Wheezes,

NORMAL BREATHING PATTERN.  absent: Accessory Muscle Use, Rales, Rhonchi, Stridor





- Cardiovascular Exam


Cardiovascular Exam: REGULAR RHYTHM, +S1, +S2.  absent: Tachycardia





- GI/Abdominal Exam


GI & Abdominal Exam: Soft, Normal Bowel Sounds.  absent: Tenderness





- Extremities Exam


Extremities Exam: absent: Calf Tenderness, Pedal Edema





- Neurological Exam


Neurological Exam: Alert, Awake, Oriented x3





- Skin


Skin Exam: Dry, Warm





Assessment and Plan





- Assessment and Plan (Free Text)


Assessment: 





62 y/o F with a PMHx of HTN, DM2, obesity, HLD, gastric ulcer and asthma is 

admitted for evaluation and mangement of  refractory asthma exacerbation, SOB 

and wheezing. Pt administered 2 doses of Epinephrine with no control.


--CXR read by me: no consolidation, cardiomegaly, R costophernic angle not 

visible.


--CT Chest on 03/11/19: Stable sub-pleural nodule less than 3 mm apex left lung.

 No change. Calcified granuloma right upper lobe 4 mm. Parabronchial thickening 

compatible with lower airway disease/bronchitis. No acute or significant 

findings related to/ accounting  for the clinical presentation.





PLAN:


Dyspnea and Wheezing


Asthma, Severe persistent, uncontrolled


- Afebrile, eosinophilia, no leukocytosis, VSS. 


- Possibly: Reractory asthma exacerbation, ABPA, fungal infection, Kamaljit's 

pneumonia


- Duonebs Q4H


- Solu-medrol 40mg Q8H


- pro-BNP wnl


- F/U aspergillus screen, TB Quant, Fungitel, Sputum culture, pro-calcitonin, 

Urine Ag for strep pneumo and legionella.  


- Home meds resumed


- Monitor vital signs





  HTN 


- controlled


- Home meds resumed


- Monitor vital signs





DM2 


- Home meds resumed


- On steroids therapy


- ISS and hypoglycemia protocol





HLD


- Home meds resumed





 Hx of gastric ulcer


- IV Pantoprazole resumed





 DVT Prophylaxis


- SCDs


- Lovenox 40mg SC daily





Case discussed with Dr Anderson.


YBecerra PGY-2





<Chepe Anderson - Last Filed: 03/23/19 11:21>





Objective





- Vital Signs/Intake and Output


Vital Signs (last 24 hours): 


                                        











Temp Pulse Resp BP Pulse Ox


 


 97.5 F L  76   18   129/76   98 


 


 03/23/19 08:04  03/23/19 09:00  03/23/19 08:04  03/23/19 08:23  03/23/19 08:04











- Medications


Medications: 


                               Current Medications





Albuterol/Ipratropium (Duoneb 3 Mg/0.5 Mg (3 Ml) Ud)  3 ml INH RQ4 [facility]


   Last Admin: 03/23/19 11:01 Dose:  3 ml


Aspirin (Ecotrin)  81 mg PO DAILY [facility]


   Last Admin: 03/23/19 08:23 Dose:  81 mg


Atorvastatin Calcium (Lipitor)  20 mg PO DAILY [facility]


   Last Admin: 03/23/19 08:23 Dose:  20 mg


Dextrose (Dextrose 50% Inj)  0 ml IV STAT PRN; Protocol


   PRN Reason: Hypoglycemia Protocol


Dextrose (Glutose 15)  0 gm PO ONCE PRN; Protocol


   PRN Reason: Hypoglycemia Protocol


Glucagon (Glucagen Diagnostic Kit)  0 mg IM STAT PRN; Protocol


   PRN Reason: Hypoglycemia Protocol


Guaifenesin/Dextromethorphan (Robitussin Dm)  10 ml PO Q6 PRN


   PRN Reason: Cough


   Last Admin: 03/23/19 01:56 Dose:  10 ml


Hydrochlorothiazide (Microzide)  12.5 mg PO DAILY [facility]


   Last Admin: 03/23/19 08:23 Dose:  12.5 mg


Insulin Human Lispro (Humalog)  0 units SC ACCU-CHECK JUDY; Protocol


   Last Admin: 03/23/19 08:45 Dose:  4 units


Losartan Potassium (Cozaar)  25 mg PO DAILY [facility]


   Last Admin: 03/23/19 08:23 Dose:  25 mg


Metformin HCl (Glucophage)  500 mg PO BID [facility]


   Last Admin: 03/23/19 08:23 Dose:  500 mg


Methylprednisolone (Solu-Medrol)  40 mg IVP Q8 [facility]


   Last Admin: 03/23/19 08:46 Dose:  40 mg


Montelukast Sodium (Singulair)  10 mg PO HS [facility]


Pantoprazole Sodium (Protonix Inj)  40 mg IVP DAILY [facility]


   Last Admin: 03/23/19 08:46 Dose:  40 mg


Fluticasone/Salmeterol (Advair Diskus 500/50)  1 puff IH Q12 [facility]


   Last Admin: 03/23/19 08:23 Dose:  1 puff











- Labs


Labs: 


                                        





                                 03/23/19 04:30 





                                 03/23/19 04:30 











Attending/Attestation





- Attestation


I have personally seen and examined this patient.: Yes


I have fully participated in the care of the patient.: Yes


I have reviewed all pertinent clinical information, including history, physical 

exam and plan: Yes


Notes (Text): 





03/23/19 11:19


Patient seen and examined with resident. Case discussed and agreed with 

assessment and plan.

## 2019-03-23 NOTE — RAD
Date of service: 



03/22/2019



HISTORY:

Chest pain



COMPARISON:

Comparison chest 08/03/2018 



TECHNIQUE:

1 view obtained.



FINDINGS:



LUNGS:

Mild bibasilar atelectasis.  Several tiny nodular densities right 

upper lung field felt to represent small granulomata 



PLEURA:

No significant pleural effusion identified, no pneumothorax apparent.



CARDIOVASCULAR:

Mild aortic atherosclerotic calcification present.



Cardiomegaly..  No pulmonary vascular congestion. 



OSSEOUS STRUCTURES:

No significant abnormalities.



VISUALIZED UPPER ABDOMEN:

Normal.



OTHER FINDINGS:

None.



IMPRESSION:

Mild bibasilar atelectasis.  Several tiny nodular densities right 

upper lung field felt to represent small granulomata

## 2019-03-23 NOTE — CARD
--------------- APPROVED REPORT --------------





Date of service: 03/22/2019



EKG Measurement

Heart [id number]

MI [id number]

[id number]

[id number]

[id number]



<Conclusion>

Normal sinus rhythm

Possible Left atrial enlargement

Borderline ECG

## 2019-03-24 LAB
ALBUMIN SERPL-MCNC: 3.9 G/DL (ref 3.5–5)
ALBUMIN/GLOB SERPL: 1.2 {RATIO} (ref 1–2.1)
ALT SERPL-CCNC: 25 U/L (ref 9–52)
AST SERPL-CCNC: 26 U/L (ref 14–36)
BASOPHILS # BLD AUTO: 0.1 K/UL (ref 0–0.2)
BASOPHILS NFR BLD: 0.4 % (ref 0–2)
BUN SERPL-MCNC: 26 MG/DL (ref 7–17)
CALCIUM SERPL-MCNC: 9.4 MG/DL (ref 8.4–10.2)
EOSINOPHIL # BLD AUTO: 0 K/UL (ref 0–0.7)
EOSINOPHIL NFR BLD: 0.1 % (ref 0–4)
ERYTHROCYTE [DISTWIDTH] IN BLOOD BY AUTOMATED COUNT: 13.5 % (ref 11.5–14.5)
GFR NON-AFRICAN AMERICAN: > 60
HGB BLD-MCNC: 13.3 G/DL (ref 12–16)
LYMPHOCYTE: 5 % (ref 20–50)
LYMPHOCYTES # BLD AUTO: 0.9 K/UL (ref 1–4.3)
LYMPHOCYTES NFR BLD AUTO: 7.5 % (ref 20–40)
MCH RBC QN AUTO: 30.4 PG (ref 27–31)
MCHC RBC AUTO-ENTMCNC: 34.3 G/DL (ref 33–37)
MCV RBC AUTO: 88.9 FL (ref 81–99)
MONOCYTE: 4 % (ref 0–10)
MONOCYTES # BLD: 0.3 K/UL (ref 0–0.8)
MONOCYTES NFR BLD: 2.8 % (ref 0–10)
NEUTROPHILS # BLD: 10.8 K/UL (ref 1.8–7)
NEUTROPHILS NFR BLD AUTO: 89.2 % (ref 50–75)
NEUTROPHILS NFR BLD AUTO: 90 % (ref 42–75)
NEUTS BAND NFR BLD: 1 % (ref 0–2)
NRBC BLD AUTO-RTO: 0 % (ref 0–0)
PLATELET # BLD EST: NORMAL 10*3/UL
PLATELET # BLD: 295 K/UL (ref 130–400)
PMV BLD AUTO: 8 FL (ref 7.2–11.7)
RBC # BLD AUTO: 4.36 MIL/UL (ref 3.8–5.2)
TOTAL CELLS COUNTED BLD: 100
TOXIC GRANULES BLD QL SMEAR: PRESENT
WBC # BLD AUTO: 12.1 K/UL (ref 4.8–10.8)

## 2019-03-24 RX ADMIN — IPRATROPIUM BROMIDE AND ALBUTEROL SULFATE SCH ML: .5; 3 SOLUTION RESPIRATORY (INHALATION) at 11:18

## 2019-03-24 RX ADMIN — INSULIN LISPRO SCH UNITS: 100 INJECTION, SOLUTION INTRAVENOUS; SUBCUTANEOUS at 00:41

## 2019-03-24 RX ADMIN — INSULIN LISPRO SCH UNITS: 100 INJECTION, SOLUTION INTRAVENOUS; SUBCUTANEOUS at 22:11

## 2019-03-24 RX ADMIN — FLUTICASONE PROPIONATE AND SALMETEROL SCH PUFF: 50; 500 POWDER RESPIRATORY (INHALATION) at 21:17

## 2019-03-24 RX ADMIN — INSULIN LISPRO SCH UNITS: 100 INJECTION, SOLUTION INTRAVENOUS; SUBCUTANEOUS at 17:04

## 2019-03-24 RX ADMIN — IPRATROPIUM BROMIDE AND ALBUTEROL SULFATE SCH ML: .5; 3 SOLUTION RESPIRATORY (INHALATION) at 23:38

## 2019-03-24 RX ADMIN — INSULIN LISPRO SCH UNITS: 100 INJECTION, SOLUTION INTRAVENOUS; SUBCUTANEOUS at 06:58

## 2019-03-24 RX ADMIN — METHYLPREDNISOLONE SODIUM SUCCINATE SCH MG: 40 INJECTION, POWDER, FOR SOLUTION INTRAMUSCULAR; INTRAVENOUS at 09:49

## 2019-03-24 RX ADMIN — IPRATROPIUM BROMIDE AND ALBUTEROL SULFATE SCH ML: .5; 3 SOLUTION RESPIRATORY (INHALATION) at 04:19

## 2019-03-24 RX ADMIN — INSULIN LISPRO SCH UNITS: 100 INJECTION, SOLUTION INTRAVENOUS; SUBCUTANEOUS at 13:02

## 2019-03-24 RX ADMIN — METHYLPREDNISOLONE SODIUM SUCCINATE SCH MG: 40 INJECTION, POWDER, FOR SOLUTION INTRAMUSCULAR; INTRAVENOUS at 17:06

## 2019-03-24 RX ADMIN — IPRATROPIUM BROMIDE AND ALBUTEROL SULFATE SCH ML: .5; 3 SOLUTION RESPIRATORY (INHALATION) at 07:36

## 2019-03-24 RX ADMIN — IPRATROPIUM BROMIDE AND ALBUTEROL SULFATE SCH ML: .5; 3 SOLUTION RESPIRATORY (INHALATION) at 19:46

## 2019-03-24 RX ADMIN — METHYLPREDNISOLONE SODIUM SUCCINATE SCH MG: 40 INJECTION, POWDER, FOR SOLUTION INTRAMUSCULAR; INTRAVENOUS at 00:42

## 2019-03-24 RX ADMIN — FLUTICASONE PROPIONATE AND SALMETEROL SCH PUFF: 50; 500 POWDER RESPIRATORY (INHALATION) at 09:45

## 2019-03-24 RX ADMIN — IPRATROPIUM BROMIDE AND ALBUTEROL SULFATE SCH ML: .5; 3 SOLUTION RESPIRATORY (INHALATION) at 15:31

## 2019-03-24 NOTE — CP.PCM.PN
<Jonnathan Mata - Last Filed: 03/24/19 13:07>





Subjective





- Date & Time of Evaluation


Date of Evaluation: 03/24/19


Time of Evaluation: 07:00





- Subjective


Subjective: 





Patient seen and examined at bedside today, no acute event overnight. Patient 

report that she still feel sob, and dyspnea upon walking, but state her symptoms

have improved since then. otherwise patient have no other complains, she denies 

any chest pain, abd pain, diarrhea or constipation. 





Objective





- Vital Signs/Intake and Output


Vital Signs (last 24 hours): 


                                        











Temp Pulse Resp BP Pulse Ox


 


 98.6 F   81   18   133/80   98 


 


 03/24/19 12:25  03/24/19 12:25  03/24/19 12:25  03/24/19 12:25  03/24/19 12:25











- Medications


Medications: 


                               Current Medications





Acetaminophen (Tylenol 325mg Tab)  650 mg PO Q6 PRN


   PRN Reason: Pain, Mild (1-3)


   Last Admin: 03/24/19 04:18 Dose:  650 mg


Albuterol/Ipratropium (Duoneb 3 Mg/0.5 Mg (3 Ml) Ud)  3 ml INH RQ4 JUDY


   Last Admin: 03/24/19 11:18 Dose:  3 ml


Aspirin (Ecotrin)  81 mg PO DAILY JUDY


   Last Admin: 03/24/19 09:46 Dose:  81 mg


Atorvastatin Calcium (Lipitor)  20 mg PO DAILY [facility]


   Last Admin: 03/24/19 09:46 Dose:  20 mg


Dextrose (Dextrose 50% Inj)  0 ml IV STAT PRN; Protocol


   PRN Reason: Hypoglycemia Protocol


Dextrose (Glutose 15)  0 gm PO ONCE PRN; Protocol


   PRN Reason: Hypoglycemia Protocol


Glucagon (Glucagen Diagnostic Kit)  0 mg IM STAT PRN; Protocol


   PRN Reason: Hypoglycemia Protocol


Guaifenesin/Dextromethorphan (Robitussin Dm)  10 ml PO Q6 PRN


   PRN Reason: Cough


   Last Admin: 03/23/19 01:56 Dose:  10 ml


Hydrochlorothiazide (Microzide)  12.5 mg PO DAILY JUDY


   Last Admin: 03/24/19 09:47 Dose:  12.5 mg


Insulin Human Lispro (Humalog)  0 units SC ACCU-CHECK JUDY; Protocol


   Last Admin: 03/24/19 06:58 Dose:  6 units


Losartan Potassium (Cozaar)  25 mg PO DAILY [facility]


   Last Admin: 03/24/19 09:45 Dose:  25 mg


Metformin HCl (Glucophage)  500 mg PO BID [facility]


   Last Admin: 03/24/19 09:46 Dose:  500 mg


Methylprednisolone (Solu-Medrol)  40 mg IVP Q8 [facility]


   Last Admin: 03/24/19 09:49 Dose:  40 mg


Montelukast Sodium (Singulair)  10 mg PO HS [facility]


   Last Admin: 03/23/19 21:00 Dose:  10 mg


Pantoprazole Sodium (Protonix Inj)  40 mg IVP DAILY [facility]


   Last Admin: 03/24/19 09:47 Dose:  40 mg


Fluticasone/Salmeterol (Advair Diskus 500/50)  1 puff IH Q12 [facility]


   Last Admin: 03/24/19 09:45 Dose:  1 puff











- Labs


Labs: 


                                        





                                 03/24/19 08:15 





                                 03/24/19 08:15 











- Constitutional


Appears: Well, Non-toxic, No Acute Distress





- Head Exam


Head Exam: ATRAUMATIC, NORMAL INSPECTION, NORMOCEPHALIC





- Eye Exam


Eye Exam: EOMI, Normal appearance, PERRL


Pupil Exam: NORMAL ACCOMODATION, PERRL





- ENT Exam


ENT Exam: Mucous Membranes Moist, Normal Exam





- Neck Exam


Neck Exam: Full ROM, Normal Inspection





- Respiratory Exam


Respiratory Exam: Decreased Breath Sounds, Rales, Wheezes


Additional comments: 





Right wheezing





- Cardiovascular Exam


Cardiovascular Exam: REGULAR RHYTHM, +S1, +S2





- GI/Abdominal Exam


GI & Abdominal Exam: Soft, Normal Bowel Sounds





- Extremities Exam


Extremities Exam: Normal Inspection





- Back Exam


Back Exam: NORMAL INSPECTION





- Neurological Exam


Neurological Exam: Alert, Awake, Oriented x3





- Psychiatric Exam


Psychiatric exam: Normal Affect, Normal Mood





- Skin


Skin Exam: Dry, Intact, Normal Color, Warm





Assessment and Plan





- Assessment and Plan (Free Text)


Assessment: 


 64 yo f with PMhx of htn, DM2, obesity, HLD, Gastric ulcer and asthma admitted 

for evaluation and managment of Refractory asthma exacerbation. 





--CXR read : mild atelactasis b/l. several tiny nodular denisty on R upper lung 

that may represent small granulomata


--CT Chest on 03/11/19: Stable sub-pleural nodule less than 3 mm apex left lung.

 No change. Calcified granuloma right upper lobe 4 mm. Parabronchial thickening 

compatible with lower airway disease/bronchitis. No acute or significant 

findings related to/ accounting  for the clinical presentation.





PLAN:


Dyspnea and Wheezing


Asthma, Severe persistent, uncontrolled


- Afebrile, eosinophilia.


-lung improving but still some wheezing on Right lower quadrant


- Leukocytosis 12.1 posible due to prednisone


- Possibly: Reractory asthma exacerbation, ABPA, fungal infection, Kamaljit's 

pneumonia


- Duonebs Q4H


- Solu-medrol 40mg Q8H


- pro-BNP wnl


- F/U aspergillus screen, TB Quant, Fungitel, Sputum culture, pro-calcitonin, 

Urine Ag for strep pneumo and legionella.  


- Home meds resumed


- Monitor vital signs





  HTN 


- controlled


- Home meds resumed


- Monitor vital signs





DM2 


- Home meds resumed


- On steroids therapy


- ISS and hypoglycemia protocol





HLD


- Home meds resumed





 Hx of gastric ulcer


- IV Pantoprazole resumed





 DVT Prophylaxis


- SCDs


- Lovenox 40mg SC daily





<Chepe Anderson D - Last Filed: 03/24/19 13:29>





Objective





- Vital Signs/Intake and Output


Vital Signs (last 24 hours): 


                                        











Temp Pulse Resp BP Pulse Ox


 


 98.6 F   81   18   133/80   98 


 


 03/24/19 12:25  03/24/19 12:25  03/24/19 12:25  03/24/19 12:25  03/24/19 12:25











- Medications


Medications: 


                               Current Medications





Acetaminophen (Tylenol 325mg Tab)  650 mg PO Q6 PRN


   PRN Reason: Pain, Mild (1-3)


   Last Admin: 03/24/19 04:18 Dose:  650 mg


Albuterol/Ipratropium (Duoneb 3 Mg/0.5 Mg (3 Ml) Ud)  3 ml INH RQ4 [facility]


   Last Admin: 03/24/19 11:18 Dose:  3 ml


Aspirin (Ecotrin)  81 mg PO DAILY [facility]


   Last Admin: 03/24/19 09:46 Dose:  81 mg


Atorvastatin Calcium (Lipitor)  20 mg PO DAILY [facility]


   Last Admin: 03/24/19 09:46 Dose:  20 mg


Dextrose (Dextrose 50% Inj)  0 ml IV STAT PRN; Protocol


   PRN Reason: Hypoglycemia Protocol


Dextrose (Glutose 15)  0 gm PO ONCE PRN; Protocol


   PRN Reason: Hypoglycemia Protocol


Glucagon (Glucagen Diagnostic Kit)  0 mg IM STAT PRN; Protocol


   PRN Reason: Hypoglycemia Protocol


Guaifenesin/Dextromethorphan (Robitussin Dm)  10 ml PO Q6 PRN


   PRN Reason: Cough


   Last Admin: 03/23/19 01:56 Dose:  10 ml


Hydrochlorothiazide (Microzide)  12.5 mg PO DAILY [facility]


   Last Admin: 03/24/19 09:47 Dose:  12.5 mg


Insulin Human Lispro (Humalog)  0 units SC ACCU-CHECK JUDY; Protocol


   Last Admin: 03/24/19 13:02 Dose:  6 units


Losartan Potassium (Cozaar)  25 mg PO DAILY [facility]


   Last Admin: 03/24/19 09:45 Dose:  25 mg


Metformin HCl (Glucophage)  500 mg PO BID [facility]


   Last Admin: 03/24/19 09:46 Dose:  500 mg


Methylprednisolone (Solu-Medrol)  40 mg IVP Q8 [facility]


   Last Admin: 03/24/19 09:49 Dose:  40 mg


Montelukast Sodium (Singulair)  10 mg PO HS [facility]


   Last Admin: 03/23/19 21:00 Dose:  10 mg


Pantoprazole Sodium (Protonix Inj)  40 mg IVP DAILY [facility]


   Last Admin: 03/24/19 09:47 Dose:  40 mg


Fluticasone/Salmeterol (Advair Diskus 500/50)  1 puff IH Q12 [facility]


   Last Admin: 03/24/19 09:45 Dose:  1 puff











- Labs


Labs: 


                                        





                                 03/24/19 08:15 





                                 03/24/19 08:15 











Attending/Attestation





- Attestation


I have personally seen and examined this patient.: Yes


I have fully participated in the care of the patient.: Yes


I have reviewed all pertinent clinical information, including history, physical 

exam and plan: Yes


Notes (Text): 





03/24/19 13:27


Patient seen and examined with resident. Case discussed and agreed with 

assessment. Patient still with slight wheezing and appeared with some laboured 

breathing.

## 2019-03-25 LAB
ALBUMIN SERPL-MCNC: 3.9 G/DL (ref 3.5–5)
ALBUMIN/GLOB SERPL: 1.2 {RATIO} (ref 1–2.1)
ALT SERPL-CCNC: 29 U/L (ref 9–52)
AST SERPL-CCNC: 17 U/L (ref 14–36)
BUN SERPL-MCNC: 27 MG/DL (ref 7–17)
CALCIUM SERPL-MCNC: 9.7 MG/DL (ref 8.4–10.2)
ERYTHROCYTE [DISTWIDTH] IN BLOOD BY AUTOMATED COUNT: 13.3 % (ref 11.5–14.5)
GFR NON-AFRICAN AMERICAN: > 60
HGB BLD-MCNC: 13.6 G/DL (ref 12–16)
MCH RBC QN AUTO: 30.1 PG (ref 27–31)
MCHC RBC AUTO-ENTMCNC: 33.9 G/DL (ref 33–37)
MCV RBC AUTO: 88.8 FL (ref 81–99)
PLATELET # BLD: 303 K/UL (ref 130–400)
RBC # BLD AUTO: 4.51 MIL/UL (ref 3.8–5.2)
WBC # BLD AUTO: 12.5 K/UL (ref 4.8–10.8)

## 2019-03-25 RX ADMIN — IPRATROPIUM BROMIDE AND ALBUTEROL SULFATE SCH ML: .5; 3 SOLUTION RESPIRATORY (INHALATION) at 11:08

## 2019-03-25 RX ADMIN — METHYLPREDNISOLONE SODIUM SUCCINATE SCH MG: 40 INJECTION, POWDER, FOR SOLUTION INTRAMUSCULAR; INTRAVENOUS at 08:26

## 2019-03-25 RX ADMIN — AMOXICILLIN AND CLAVULANATE POTASSIUM SCH TAB: 875; 125 TABLET, FILM COATED ORAL at 20:46

## 2019-03-25 RX ADMIN — IPRATROPIUM BROMIDE AND ALBUTEROL SULFATE SCH ML: .5; 3 SOLUTION RESPIRATORY (INHALATION) at 03:55

## 2019-03-25 RX ADMIN — IPRATROPIUM BROMIDE AND ALBUTEROL SULFATE SCH ML: .5; 3 SOLUTION RESPIRATORY (INHALATION) at 07:30

## 2019-03-25 RX ADMIN — IPRATROPIUM BROMIDE AND ALBUTEROL SULFATE SCH ML: .5; 3 SOLUTION RESPIRATORY (INHALATION) at 15:17

## 2019-03-25 RX ADMIN — INSULIN LISPRO SCH UNITS: 100 INJECTION, SOLUTION INTRAVENOUS; SUBCUTANEOUS at 07:00

## 2019-03-25 RX ADMIN — INSULIN LISPRO SCH UNITS: 100 INJECTION, SOLUTION INTRAVENOUS; SUBCUTANEOUS at 16:59

## 2019-03-25 RX ADMIN — METHYLPREDNISOLONE SODIUM SUCCINATE SCH MG: 40 INJECTION, POWDER, FOR SOLUTION INTRAMUSCULAR; INTRAVENOUS at 01:00

## 2019-03-25 RX ADMIN — FLUTICASONE PROPIONATE AND SALMETEROL SCH PUFF: 50; 500 POWDER RESPIRATORY (INHALATION) at 20:46

## 2019-03-25 RX ADMIN — METHYLPREDNISOLONE SODIUM SUCCINATE SCH MG: 40 INJECTION, POWDER, FOR SOLUTION INTRAMUSCULAR; INTRAVENOUS at 20:46

## 2019-03-25 RX ADMIN — FLUTICASONE PROPIONATE AND SALMETEROL SCH PUFF: 50; 500 POWDER RESPIRATORY (INHALATION) at 08:26

## 2019-03-25 RX ADMIN — INSULIN LISPRO SCH UNITS: 100 INJECTION, SOLUTION INTRAVENOUS; SUBCUTANEOUS at 11:47

## 2019-03-25 RX ADMIN — IPRATROPIUM BROMIDE AND ALBUTEROL SULFATE SCH ML: .5; 3 SOLUTION RESPIRATORY (INHALATION) at 19:05

## 2019-03-25 RX ADMIN — AMOXICILLIN AND CLAVULANATE POTASSIUM SCH TAB: 875; 125 TABLET, FILM COATED ORAL at 16:58

## 2019-03-25 RX ADMIN — INSULIN LISPRO SCH: 100 INJECTION, SOLUTION INTRAVENOUS; SUBCUTANEOUS at 22:19

## 2019-03-25 NOTE — CP.PCM.CON
History of Present Illness





- History of Present Illness


History of Present Illness: 





This 63 year old [place], never smoker female with a 7 year history of 

Bronchial Asthma was admitted from the emergency room with acute exacerbation of

bronchospasm. She was at her allergist's office when she was referred here 

because of SOB and wheeze which was unresponsive to usual measures. She does 

feel slightly improved at this time after receiving aerosol therapy and 

parenteral corticosteroids. She has no fever, chills or sweats, has been 

hospitalized before because of Asthma, but never intubated. I am unable to 

determine the extent of allergy testing to date, and do not find any reports in 

the EMR regarding this. There is a family history of Asthma in her brother. She 

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 

chemicals. She does relate having had Pneumonia once in the remote past. She cl

aims to sleep poorly at night because of increased symptoms in the supine 

position.





Review of Systems





- Constitutional


Constitutional: Fatigue





- EENT


Nose/Mouth/Throat: Epistaxis (mild), Sinus Pressure





- Respiratory


Respiratory: Cough, Dyspnea, Wheezing





- Integumentary


Integumentary: Change in Pigmentation (both ankles)





Past Patient History





- Infectious Disease


Hx of Infectious Diseases: None





- Past Medical History & Family History


Past Medical History?: Yes


Pertinent Family History: 





Bronchial Asthma, Diabetes, Heart disease





- Past Social History


Smoking Status: Never Smoked


Chewing Tobacco Use: No


Cigar Use: No


Alcohol: None


Drugs: Denies





- CARDIAC


Hx Hypercholesterolemia: Yes


Hx Hypertension: Yes


Hx Peripheral Edema: Yes


Hx Peripheral Vascular Disease: Yes





- PULMONARY


Hx Asthma: Yes


Hx Bronchitis: Yes


Hx Pneumonia: Yes





- NEUROLOGICAL


Hx Neurological Disorder: No





- HEENT


Hx Sinusitis: Yes





- RENAL


Hx Chronic Kidney Disease: No





- ENDOCRINE/METABOLIC


Hx Diabetes Mellitus Type 2: Yes





- HEMATOLOGICAL/ONCOLOGICAL


Hx Human Immunodeficiency Virus (HIV): No





- INTEGUMENTARY


Hx Dermatological Problems: No





- MUSCULOSKELETAL/RHEUMATOLOGICAL


Hx Musculoskeletal Disorders: Yes





- GASTROINTESTINAL


Hx Diverticulitis: Yes


Hx Gastritis: Yes


Other/Comment: Fatty liver





- GENITOURINARY/GYNECOLOGICAL


Hx Genitourinary Disorders: No





- PSYCHIATRIC


Hx Anxiety: Yes


Hx Bipolar Disorder: Yes


Hx Depression: Yes





- SURGICAL HISTORY


Hx Cardiac Catheterization: Yes


Hx Hysterectomy: Yes





- ANESTHESIA


Hx Anesthesia: Yes


Hx Anesthesia Reactions: No


Hx Malignant Hyperthermia: No





Meds


Allergies/Adverse Reactions: 


                                    Allergies











Allergy/AdvReac Type Severity Reaction Status Date / Time


 


iodine Allergy  RASH Verified 03/22/19 19:32














- Medications


Medications: 


                               Current Medications





Acetaminophen (Tylenol 325mg Tab)  650 mg PO Q6 PRN


   PRN Reason: Pain, Mild (1-3)


   Last Admin: 03/24/19 04:18 Dose:  650 mg


Albuterol/Ipratropium (Duoneb 3 Mg/0.5 Mg (3 Ml) Ud)  3 ml INH RQ4 [facility]


   Last Admin: 03/25/19 07:30 Dose:  3 ml


Aspirin (Ecotrin)  81 mg PO DAILY [facility]


   Last Admin: 03/25/19 08:26 Dose:  81 mg


Atorvastatin Calcium (Lipitor)  20 mg PO DAILY [facility]


   Last Admin: 03/25/19 08:25 Dose:  20 mg


Dextrose (Dextrose 50% Inj)  0 ml IV STAT PRN; Protocol


   PRN Reason: Hypoglycemia Protocol


Dextrose (Glutose 15)  0 gm PO ONCE PRN; Protocol


   PRN Reason: Hypoglycemia Protocol


Glucagon (Glucagen Diagnostic Kit)  0 mg IM STAT PRN; Protocol


   PRN Reason: Hypoglycemia Protocol


Guaifenesin/Dextromethorphan (Robitussin Dm)  10 ml PO Q6 PRN


   PRN Reason: Cough


   Last Admin: 03/23/19 01:56 Dose:  10 ml


Hydrochlorothiazide (Microzide)  12.5 mg PO DAILY [facility]


   Last Admin: 03/25/19 08:26 Dose:  12.5 mg


Insulin Human Lispro (Humalog)  0 units SC ACCU-CHECK [facility]; Protocol


   Last Admin: 03/25/19 07:00 Dose:  6 units


Losartan Potassium (Cozaar)  25 mg PO DAILY [facility]


   Last Admin: 03/25/19 08:26 Dose:  25 mg


Metformin HCl (Glucophage)  500 mg PO BID [facility]


   Last Admin: 03/25/19 08:28 Dose:  500 mg


Methylprednisolone (Solu-Medrol)  40 mg IVP Q8 [facility]


   Last Admin: 03/25/19 08:26 Dose:  40 mg


Montelukast Sodium (Singulair)  10 mg PO HS [facility]


   Last Admin: 03/24/19 21:17 Dose:  10 mg


Pantoprazole Sodium (Protonix Inj)  40 mg IVP DAILY [facility]


   Last Admin: 03/25/19 08:27 Dose:  40 mg


Fluticasone/Salmeterol (Advair Diskus 500/50)  1 puff IH Q12 [facility]


   Last Admin: 03/25/19 08:26 Dose:  1 puff











Results





- Vital Signs


Recent Vital Signs: 


                                Last Vital Signs











Temp  97.4 F L  03/25/19 08:23


 


Pulse  71   03/25/19 08:26


 


Resp  20   03/25/19 08:23


 


BP  113/67   03/25/19 08:26


 


Pulse Ox  97   03/25/19 08:23














- Labs


Result Diagrams: 


                                 03/25/19 04:55





                                 03/25/19 04:55


Labs: 


                         Laboratory Results - last 24 hr











  03/22/19 03/24/19 03/24/19





  07:00 08:15 11:31


 


WBC   


 


RBC   


 


Hgb   


 


Hct   


 


MCV   


 


MCH   


 


MCHC   


 


RDW   


 


Plt Count   


 


Neutrophils % (Manual)   90 H 


 


Band Neutrophils %   1 


 


Lymphocytes % (Manual)   5 L 


 


Monocytes % (Manual)   4 


 


Toxic Granulation   Present 


 


Platelet Estimate   Normal 


 


Sodium   


 


Potassium   


 


Chloride   


 


Carbon Dioxide   


 


Anion Gap   


 


BUN   


 


Creatinine   


 


Est GFR ( Amer)   


 


Est GFR (Non-Af Amer)   


 


POC Glucose (mg/dL)    320 H


 


Random Glucose   


 


Calcium   


 


Total Bilirubin   


 


AST   


 


ALT   


 


Alkaline Phosphatase   


 


Total Protein   


 


Albumin   


 


Globulin   


 


Albumin/Globulin Ratio   


 


Ur L.pneumophila Ag  Negative  














  03/24/19 03/24/19 03/25/19





  16:15 21:48 02:26


 


WBC   


 


RBC   


 


Hgb   


 


Hct   


 


MCV   


 


MCH   


 


MCHC   


 


RDW   


 


Plt Count   


 


Neutrophils % (Manual)   


 


Band Neutrophils %   


 


Lymphocytes % (Manual)   


 


Monocytes % (Manual)   


 


Toxic Granulation   


 


Platelet Estimate   


 


Sodium   


 


Potassium   


 


Chloride   


 


Carbon Dioxide   


 


Anion Gap   


 


BUN   


 


Creatinine   


 


Est GFR ( Amer)   


 


Est GFR (Non-Af Amer)   


 


POC Glucose (mg/dL)  327 H  375 H  303 H


 


Random Glucose   


 


Calcium   


 


Total Bilirubin   


 


AST   


 


ALT   


 


Alkaline Phosphatase   


 


Total Protein   


 


Albumin   


 


Globulin   


 


Albumin/Globulin Ratio   


 


Ur L.pneumophila Ag   














  03/25/19 03/25/19 03/25/19





  04:55 04:55 05:09


 


WBC  12.5 H  


 


RBC  4.51  


 


Hgb  13.6  


 


Hct  40.1  


 


MCV  88.8  


 


MCH  30.1  


 


MCHC  33.9  


 


RDW  13.3  


 


Plt Count  303  


 


Neutrophils % (Manual)   


 


Band Neutrophils %   


 


Lymphocytes % (Manual)   


 


Monocytes % (Manual)   


 


Toxic Granulation   


 


Platelet Estimate   


 


Sodium   134 


 


Potassium   4.3 


 


Chloride   100 


 


Carbon Dioxide   22 


 


Anion Gap   16 


 


BUN   27 H 


 


Creatinine   0.6 L 


 


Est GFR ( Amer)   > 60 


 


Est GFR (Non-Af Amer)   > 60 


 


POC Glucose (mg/dL)    344 H


 


Random Glucose   347 H 


 


Calcium   9.7 


 


Total Bilirubin   0.5 


 


AST   17 


 


ALT   29 


 


Alkaline Phosphatase   110 


 


Total Protein   7.3 


 


Albumin   3.9 


 


Globulin   3.3 


 


Albumin/Globulin Ratio   1.2 


 


Ur L.pneumophila Ag   














Assessment & Plan


(1) Maxillary sinusitis


Status: Suspected   Priority: High   





(2) Asthma exacerbation


Status: Acute   Priority: High   





(3) Bronchitis


Status: Chronic   Priority: High   





- Date & Time


Date: 03/25/19


Time: 10:27

## 2019-03-25 NOTE — CP.PCM.PN
Subjective





- Date & Time of Evaluation


Date of Evaluation: 03/25/19


Time of Evaluation: 08:41





- Subjective


Subjective: 





Patient seen and examined this morning, feeling better though still get tired 

while conversation, and aslo reports sob on ambulation,  c/o cough and facial 

pain since yesterday. No runny nose, sputum production, chills.


VSS, afebrile, no acute overnight events.





Objective





- Vital Signs/Intake and Output


Vital Signs (last 24 hours): 


                                        











Temp Pulse Resp BP Pulse Ox


 


 97.4 F L  71   20   113/67   97 


 


 03/25/19 08:23  03/25/19 08:26  03/25/19 08:23  03/25/19 08:26  03/25/19 08:23











- Medications


Medications: 


                               Current Medications





Acetaminophen (Tylenol 325mg Tab)  650 mg PO Q6 PRN


   PRN Reason: Pain, Mild (1-3)


   Last Admin: 03/24/19 04:18 Dose:  650 mg


Albuterol/Ipratropium (Duoneb 3 Mg/0.5 Mg (3 Ml) Ud)  3 ml INH RQ4 [facility]


   Last Admin: 03/25/19 11:08 Dose:  3 ml


Amoxicillin/Clavulanate Potassium (Augmentin 875 Mg-125 Mg Tab)  1 tab PO Q12 

[facility]; Protocol


Aspirin (Ecotrin)  81 mg PO DAILY [facility]


   Last Admin: 03/25/19 08:26 Dose:  81 mg


Atorvastatin Calcium (Lipitor)  20 mg PO DAILY [facility]


   Last Admin: 03/25/19 08:25 Dose:  20 mg


Dextrose (Dextrose 50% Inj)  0 ml IV STAT PRN; Protocol


   PRN Reason: Hypoglycemia Protocol


Dextrose (Glutose 15)  0 gm PO ONCE PRN; Protocol


   PRN Reason: Hypoglycemia Protocol


Glucagon (Glucagen Diagnostic Kit)  0 mg IM STAT PRN; Protocol


   PRN Reason: Hypoglycemia Protocol


Guaifenesin/Dextromethorphan (Robitussin Dm)  10 ml PO Q6 PRN


   PRN Reason: Cough


   Last Admin: 03/23/19 01:56 Dose:  10 ml


Hydrochlorothiazide (Microzide)  12.5 mg PO DAILY [facility]


   Last Admin: 03/25/19 08:26 Dose:  12.5 mg


Insulin Human Lispro (Humalog)  0 units SC ACCU-CHECK [facility]; Protocol


   Last Admin: 03/25/19 11:47 Dose:  10 units


Losartan Potassium (Cozaar)  25 mg PO DAILY [facility]


   Last Admin: 03/25/19 08:26 Dose:  25 mg


Metformin HCl (Glucophage)  500 mg PO BID [facility]


   Last Admin: 03/25/19 08:28 Dose:  500 mg


Methylprednisolone (Solu-Medrol)  40 mg IVP Q12 [facility]


Montelukast Sodium (Singulair)  10 mg PO HS [facility]


   Last Admin: 03/24/19 21:17 Dose:  10 mg


Pantoprazole Sodium (Protonix Inj)  40 mg IVP DAILY [facility]


   Last Admin: 03/25/19 08:27 Dose:  40 mg


Fluticasone/Salmeterol (Advair Diskus 500/50)  1 puff IH Q12 [facility]


   Last Admin: 03/25/19 08:26 Dose:  1 puff











- Labs


Labs: 


                                        





                                 03/25/19 04:55 





                                 03/25/19 04:55 











- Constitutional


Appears: No Acute Distress





- Head Exam


Head Exam: NORMAL INSPECTION


Additional comments: 


frontal and maxillar tenderness, no nasal or posterior pharynx drainage noted





- Respiratory Exam


Respiratory Exam: Decreased Breath Sounds (b/l), Clear to Ausculation Bilateral,

NORMAL BREATHING PATTERN.  absent: Rales, Rhonchi, Wheezes





- Cardiovascular Exam


Cardiovascular Exam: REGULAR RHYTHM, +S1, +S2.  absent: Tachycardia





- GI/Abdominal Exam


GI & Abdominal Exam: Soft, Normal Bowel Sounds.  absent: Tenderness





- Extremities Exam


Extremities Exam: absent: Calf Tenderness, Pedal Edema





- Neurological Exam


Neurological Exam: Alert, Awake, CN II-XII Intact, Oriented x3





- Skin


Skin Exam: Dry, Warm





Assessment and Plan





- Assessment and Plan (Free Text)


Assessment: 





62 yo f with PMhx of htn, DM2, obesity, HLD, Gastric ulcer and asthma admitted 

for evaluation and management of Refractory asthma exacerbation. 





--CXR read : mild atelactasis b/l. several tiny nodular denisty on R upper lung 

that may represent small granulomata


--CT Chest on 03/11/19: Stable sub-pleural nodule less than 3 mm apex left lung.

 No change. Calcified granuloma right upper lobe 4 mm. Parabronchial thickening 

compatible with lower airway disease/bronchitis. No acute or significant 

findings related to/ accounting  for the clinical presentation.





PLAN:


Dyspnea and Wheezing


Asthma, Severe persistent, uncontrolled


- doing better


- Afebrile, eosinophilia.


- Leukocytosis 12.1 likely 2/2 to steroids


- Possibly: Reractory asthma exacerbation, ABPA, fungal infection, Kamaljit's 

pneumonia


- Pulmonology Dr Pinon consulted, recs appreciated.


- Duonebs Q4H


- taper Solu-medrol 40mg Q12H


- legionella negative


- F/U aspergillus screen, TB Quant, Fungitel, Sputum culture, pro-calcitonin, 

Urine Ag for strep pneumo.  


- Home meds resumed


- Monitor vital signs





Acute sinusitis


- frontal and maxilar tenderness


- afebrile, mild leukocytosis


- Start Augmentin 875 BID  





  HTN 


- controlled


- Home meds resumed


- Monitor vital signs





DM2 with hyperglycemia


- likely 2/2 to steroids, start tapering


- Home meds resumed


- ISS and hypoglycemia protocol





HLD


- Home meds resumed





 Hx of gastric ulcer


- IV Pantoprazole resumed





 DVT Prophylaxis


- SCDs


- Lovenox 40mg SC daily








Case seen and examined with Dr Leroy Manzanares PGY 2

## 2019-03-26 LAB
AMER SYCAMORE IGE QN: 3.24 KU/L (ref ?–0.1)
BUN SERPL-MCNC: 30 MG/DL (ref 7–17)
CALCIUM SERPL-MCNC: 9.5 MG/DL (ref 8.4–10.2)
D FARINAE IGE QN: 0.71 KU/L (ref ?–0.1)
DOG DANDER IGE QN: 0.13 KU/L (ref ?–0.1)
ERYTHROCYTE [DISTWIDTH] IN BLOOD BY AUTOMATED COUNT: 13.5 % (ref 11.5–14.5)
GFR NON-AFRICAN AMERICAN: > 60
HGB BLD-MCNC: 14.2 G/DL (ref 12–16)
MCH RBC QN AUTO: 30.3 PG (ref 27–31)
MCHC RBC AUTO-ENTMCNC: 34 G/DL (ref 33–37)
MCV RBC AUTO: 89.4 FL (ref 81–99)
MT JUNIPER IGE QN: 2.38 KU/L (ref ?–0.1)
PLATELET # BLD: 311 K/UL (ref 130–400)
RBC # BLD AUTO: 4.68 MIL/UL (ref 3.8–5.2)
SILVER BIRCH IGE QN: 2.06 KU/L (ref ?–0.1)
WBC # BLD AUTO: 10.6 K/UL (ref 4.8–10.8)
WHITE OAK IGE QN: 2.93 KU/L (ref ?–0.1)

## 2019-03-26 RX ADMIN — INSULIN LISPRO SCH: 100 INJECTION, SOLUTION INTRAVENOUS; SUBCUTANEOUS at 22:38

## 2019-03-26 RX ADMIN — METHYLPREDNISOLONE SODIUM SUCCINATE SCH MG: 40 INJECTION, POWDER, FOR SOLUTION INTRAMUSCULAR; INTRAVENOUS at 21:28

## 2019-03-26 RX ADMIN — AMOXICILLIN AND CLAVULANATE POTASSIUM SCH TAB: 875; 125 TABLET, FILM COATED ORAL at 11:45

## 2019-03-26 RX ADMIN — IPRATROPIUM BROMIDE AND ALBUTEROL SULFATE SCH ML: .5; 3 SOLUTION RESPIRATORY (INHALATION) at 11:10

## 2019-03-26 RX ADMIN — FLUTICASONE PROPIONATE AND SALMETEROL SCH PUFF: 50; 500 POWDER RESPIRATORY (INHALATION) at 08:25

## 2019-03-26 RX ADMIN — INSULIN LISPRO SCH UNITS: 100 INJECTION, SOLUTION INTRAVENOUS; SUBCUTANEOUS at 16:34

## 2019-03-26 RX ADMIN — IPRATROPIUM BROMIDE AND ALBUTEROL SULFATE SCH ML: .5; 3 SOLUTION RESPIRATORY (INHALATION) at 19:09

## 2019-03-26 RX ADMIN — IPRATROPIUM BROMIDE AND ALBUTEROL SULFATE SCH ML: .5; 3 SOLUTION RESPIRATORY (INHALATION) at 15:58

## 2019-03-26 RX ADMIN — IPRATROPIUM BROMIDE AND ALBUTEROL SULFATE SCH ML: .5; 3 SOLUTION RESPIRATORY (INHALATION) at 23:19

## 2019-03-26 RX ADMIN — IPRATROPIUM BROMIDE AND ALBUTEROL SULFATE SCH ML: .5; 3 SOLUTION RESPIRATORY (INHALATION) at 00:13

## 2019-03-26 RX ADMIN — METHYLPREDNISOLONE SODIUM SUCCINATE SCH MG: 40 INJECTION, POWDER, FOR SOLUTION INTRAMUSCULAR; INTRAVENOUS at 08:27

## 2019-03-26 RX ADMIN — INSULIN LISPRO SCH UNITS: 100 INJECTION, SOLUTION INTRAVENOUS; SUBCUTANEOUS at 11:46

## 2019-03-26 RX ADMIN — IPRATROPIUM BROMIDE AND ALBUTEROL SULFATE SCH ML: .5; 3 SOLUTION RESPIRATORY (INHALATION) at 07:49

## 2019-03-26 RX ADMIN — AMOXICILLIN AND CLAVULANATE POTASSIUM SCH TAB: 875; 125 TABLET, FILM COATED ORAL at 21:02

## 2019-03-26 RX ADMIN — IPRATROPIUM BROMIDE AND ALBUTEROL SULFATE SCH: .5; 3 SOLUTION RESPIRATORY (INHALATION) at 15:27

## 2019-03-26 RX ADMIN — INSULIN LISPRO SCH UNITS: 100 INJECTION, SOLUTION INTRAVENOUS; SUBCUTANEOUS at 06:05

## 2019-03-26 RX ADMIN — IPRATROPIUM BROMIDE AND ALBUTEROL SULFATE SCH ML: .5; 3 SOLUTION RESPIRATORY (INHALATION) at 04:38

## 2019-03-26 NOTE — NM
Date of service: 



03/26/2019



COMPARISON:

Not available



TECHNIQUE:

37.762 mCi technetium 99-m  DTPA aerosol. 



5.3 mCI technetium 99-m MAA administered intravenously.



FINDINGS:



VENTILATION COMPONENT:

Limited by extensive central tracheobronchial deposition of the 

inhaled radiopharmaceutical.



PERFUSION COMPONENT:

No perfusion defects appreciated in either lung.



IMPRESSION:

Lowprobability ventilation perfusion scan for pulmonary embolism.

## 2019-03-26 NOTE — RAD
Date of service: 



03/26/2019



PROCEDURE:  CHEST RADIOGRAPH, 1 VIEW



HISTORY:

sob



COMPARISON:

3/22/2019



FINDINGS:



LUNGS:

Clear.



PLEURA:

No pneumothorax or pleural fluid seen.



CARDIOVASCULAR:

 No aortic atherosclerotic calcification present. Normal.



OSSEOUS STRUCTURES:

Thoracic scoliosis.



VISUALIZED UPPER ABDOMEN:

Normal.



OTHER FINDINGS:

None. 



IMPRESSION:

No active disease.

## 2019-03-26 NOTE — CP.PCM.PN
Subjective





- Date & Time of Evaluation


Date of Evaluation: 03/26/19


Time of Evaluation: 08:32





- Subjective


Subjective: 





Patient seen and examined this morning, feeling much better though still get 

tired while conversation and on ambulation, cough improving.


VSS, afebrile, no acute overnight events.





Objective





- Vital Signs/Intake and Output


Vital Signs (last 24 hours): 


                                        











Temp Pulse Resp BP Pulse Ox


 


 97.4 F L  71   18   141/84   93 L


 


 03/26/19 11:50  03/26/19 11:50  03/26/19 11:50  03/26/19 11:50  03/26/19 11:50











- Medications


Medications: 


                               Current Medications





Acetaminophen (Tylenol 325mg Tab)  650 mg PO Q6 PRN


   PRN Reason: Pain, Mild (1-3)


   Last Admin: 03/24/19 04:18 Dose:  650 mg


Albuterol/Ipratropium (Duoneb 3 Mg/0.5 Mg (3 Ml) Ud)  3 ml INH RQ4 [facility]


   Last Admin: 03/26/19 11:10 Dose:  3 ml


Amoxicillin/Clavulanate Potassium (Augmentin 875 Mg-125 Mg Tab)  1 tab PO Q12 

[facility]; Protocol


   Last Admin: 03/26/19 11:45 Dose:  1 tab


Aspirin (Ecotrin)  81 mg PO DAILY [facility]


   Last Admin: 03/26/19 08:26 Dose:  81 mg


Atorvastatin Calcium (Lipitor)  20 mg PO DAILY [facility]


   Last Admin: 03/26/19 08:26 Dose:  20 mg


Dextrose (Dextrose 50% Inj)  0 ml IV STAT PRN; Protocol


   PRN Reason: Hypoglycemia Protocol


Dextrose (Glutose 15)  0 gm PO ONCE PRN; Protocol


   PRN Reason: Hypoglycemia Protocol


Glucagon (Glucagen Diagnostic Kit)  0 mg IM STAT PRN; Protocol


   PRN Reason: Hypoglycemia Protocol


Guaifenesin/Dextromethorphan (Robitussin Dm)  10 ml PO Q6 PRN


   PRN Reason: Cough


   Last Admin: 03/23/19 01:56 Dose:  10 ml


Hydrochlorothiazide (Microzide)  12.5 mg PO DAILY [facility]


   Last Admin: 03/26/19 08:27 Dose:  12.5 mg


Insulin Human Lispro (Humalog)  0 units SC ACCU-CHECK [facility]; Protocol


   Last Admin: 03/26/19 11:46 Dose:  8 units


Losartan Potassium (Cozaar)  25 mg PO DAILY [facility]


   Last Admin: 03/26/19 08:26 Dose:  25 mg


Metformin HCl (Glucophage)  500 mg PO BID [facility]


   Last Admin: 03/26/19 08:26 Dose:  500 mg


Methylprednisolone (Solu-Medrol)  30 mg IVP Q12 [facility]


Montelukast Sodium (Singulair)  10 mg PO HS [facility]


   Last Admin: 03/25/19 21:00 Dose:  10 mg


Pantoprazole Sodium (Protonix Inj)  40 mg IVP DAILY [facility]


   Last Admin: 03/26/19 08:27 Dose:  40 mg


Fluticasone/Salmeterol (Advair Diskus 500/50)  1 puff IH Q12 [facility]


   Last Admin: 03/26/19 08:25 Dose:  1 puff











- Labs


Labs: 


                                        





                                 03/26/19 04:45 





                                 03/26/19 04:45 











- Constitutional


Appears: No Acute Distress





- Head Exam


Head Exam: NORMAL INSPECTION





- Respiratory Exam


Respiratory Exam: Chest Wall Tenderness, Clear to Ausculation Bilateral, NORMAL 

BREATHING PATTERN.  absent: Accessory Muscle Use





- Cardiovascular Exam


Cardiovascular Exam: REGULAR RHYTHM, +S1, +S2





- GI/Abdominal Exam


GI & Abdominal Exam: Soft, Normal Bowel Sounds.  absent: Tenderness





- Extremities Exam


Extremities Exam: absent: Calf Tenderness, Pedal Edema





- Neurological Exam


Neurological Exam: Alert, Awake, Oriented x3





- Skin


Skin Exam: Dry, Warm





Assessment and Plan





- Assessment and Plan (Free Text)


Assessment: 





64 yo f with PMhx of htn, DM2, obesity, HLD, Gastric ulcer and asthma admitted 

for evaluation and management of Refractory asthma exacerbation. 





--CXR read : mild atelactasis b/l. several tiny nodular denisty on R upper lung 

that may represent small granulomata


--CT Chest on 03/11/19: Stable sub-pleural nodule less than 3 mm apex left lung.

 No change. Calcified granuloma right upper lobe 4 mm. Parabronchial thickening 

compatible with lower airway disease/bronchitis. No acute or significant 

findings related to/ accounting  for the clinical presentation.





PLAN:


Dyspnea and Wheezing


Asthma, Severe persistent, uncontrolled


- doing better


- Possibly: Reractory asthma exacerbation, ABPA, fungal infection, Kamaljit's 

pneumonia


- Pulmonology Dr Pinon consulted, recs appreciated.


- Duonebs Q4H


- taper Solu-medrol 30mg Q12H


- legionella negative, HIV neg


- F/U aspergillus screen, TB Quant, Fungitel, Sputum culture, pro-calcitonin, 

Urine Ag for strep pneumo.  


- Home meds resumed


- Monitor vital signs





Acute sinusitis


- frontal and maxilar tenderness


- afebrile, mild leukocytosis


- on Augmentin 875 BID  





  HTN 


- controlled


- Home meds resumed


- Monitor vital signs





DM2 with hyperglycemia


- likely 2/2 to steroids, tapering


- Home meds resumed


- ISS and hypoglycemia protocol





HLD


- Home meds resumed





 Hx of gastric ulcer


- IV Pantoprazole resumed





 DVT Prophylaxis


- SCDs


- Lovenox 40mg SC daily








Case seen and examined with Dr Leroy Manzanares PGY 2

## 2019-03-26 NOTE — CP.PCM.PN
Subjective





- Date & Time of Evaluation


Date of Evaluation: 03/26/19


Time of Evaluation: 10:21





- Subjective


Subjective: 





Claims her breathing feels improved.


Vital signs and oxygenation have been stable.


Still complains of chest discomfort (pain) on the left.


Hyperglycemia is significant on steroids.


Sputum is reported as 'normal virgilio'.





Seems top be breathing comfortably seated up in bed.


Appears anxious, but not dyspneic.


No dependant edema, no cyanosis.


Pharynx is pink and moist.


Neck is supple and trachea midline.


No palpable lymphadenopathy.


No dullness on chest percussion.


Equal expansion bilaterally.


Breath sounds are well heard bilaterally. 


No audible wheezes, few rhonchi, no rales.


Heart sounds well heard, regular rhythm.





Will reduce solumedrol to 30MG Q12H.


V/Q lung scan requested.


Continue aerosol therapy.


Continue antibiotics for 10 days.


Continue the aerosol therapy and place Advair on hold for now.





Objective





- Vital Signs/Intake and Output


Vital Signs (last 24 hours): 


                                        











Temp Pulse Resp BP Pulse Ox


 


 98 F   69   18   114/72   95 


 


 03/26/19 07:58  03/26/19 08:26  03/26/19 07:58  03/26/19 08:26  03/26/19 07:58











- Medications


Medications: 


                               Current Medications





Acetaminophen (Tylenol 325mg Tab)  650 mg PO Q6 PRN


   PRN Reason: Pain, Mild (1-3)


   Last Admin: 03/24/19 04:18 Dose:  650 mg


Albuterol/Ipratropium (Duoneb 3 Mg/0.5 Mg (3 Ml) Ud)  3 ml INH RQ4 [facility]


   Last Admin: 03/26/19 07:49 Dose:  3 ml


Amoxicillin/Clavulanate Potassium (Augmentin 875 Mg-125 Mg Tab)  1 tab PO Q12 

[facility]; Protocol


   Last Admin: 03/25/19 20:46 Dose:  1 tab


Aspirin (Ecotrin)  81 mg PO DAILY [facility]


   Last Admin: 03/26/19 08:26 Dose:  81 mg


Atorvastatin Calcium (Lipitor)  20 mg PO DAILY [facility]


   Last Admin: 03/26/19 08:26 Dose:  20 mg


Dextrose (Dextrose 50% Inj)  0 ml IV STAT PRN; Protocol


   PRN Reason: Hypoglycemia Protocol


Dextrose (Glutose 15)  0 gm PO ONCE PRN; Protocol


   PRN Reason: Hypoglycemia Protocol


Glucagon (Glucagen Diagnostic Kit)  0 mg IM STAT PRN; Protocol


   PRN Reason: Hypoglycemia Protocol


Guaifenesin/Dextromethorphan (Robitussin Dm)  10 ml PO Q6 PRN


   PRN Reason: Cough


   Last Admin: 03/23/19 01:56 Dose:  10 ml


Hydrochlorothiazide (Microzide)  12.5 mg PO DAILY [facility]


   Last Admin: 03/26/19 08:27 Dose:  12.5 mg


Insulin Human Lispro (Humalog)  0 units SC ACCU-CHECK [facility]; Protocol


   Last Admin: 03/26/19 06:05 Dose:  10 units


Losartan Potassium (Cozaar)  25 mg PO DAILY [facility]


   Last Admin: 03/26/19 08:26 Dose:  25 mg


Metformin HCl (Glucophage)  500 mg PO BID [facility]


   Last Admin: 03/26/19 08:26 Dose:  500 mg


Montelukast Sodium (Singulair)  10 mg PO HS [facility]


   Last Admin: 03/25/19 21:00 Dose:  10 mg


Pantoprazole Sodium (Protonix Inj)  40 mg IVP DAILY [facility]


   Last Admin: 03/26/19 08:27 Dose:  40 mg


Fluticasone/Salmeterol (Advair Diskus 500/50)  1 puff IH Q12 [facility]


   Last Admin: 03/26/19 08:25 Dose:  1 puff











- Labs


Labs: 


                                        





                                 03/26/19 04:45 





                                 03/26/19 04:45 











Assessment and Plan


(1) Maxillary sinusitis


Status: Suspected   





(2) Asthma exacerbation


Status: Acute   





(3) Bronchitis


Status: Chronic

## 2019-03-27 LAB
BUN SERPL-MCNC: 28 MG/DL (ref 7–17)
CALCIUM SERPL-MCNC: 9.7 MG/DL (ref 8.4–10.2)
ERYTHROCYTE [DISTWIDTH] IN BLOOD BY AUTOMATED COUNT: 13.4 % (ref 11.5–14.5)
GFR NON-AFRICAN AMERICAN: > 60
HGB BLD-MCNC: 14.4 G/DL (ref 12–16)
MCH RBC QN AUTO: 30.5 PG (ref 27–31)
MCHC RBC AUTO-ENTMCNC: 34.1 G/DL (ref 33–37)
MCV RBC AUTO: 89.2 FL (ref 81–99)
PLATELET # BLD: 327 K/UL (ref 130–400)
RBC # BLD AUTO: 4.72 MIL/UL (ref 3.8–5.2)
WBC # BLD AUTO: 11 K/UL (ref 4.8–10.8)

## 2019-03-27 RX ADMIN — INSULIN LISPRO SCH UNITS: 100 INJECTION, SOLUTION INTRAVENOUS; SUBCUTANEOUS at 17:27

## 2019-03-27 RX ADMIN — INSULIN LISPRO SCH UNITS: 100 INJECTION, SOLUTION INTRAVENOUS; SUBCUTANEOUS at 07:02

## 2019-03-27 RX ADMIN — IPRATROPIUM BROMIDE AND ALBUTEROL SULFATE SCH ML: .5; 3 SOLUTION RESPIRATORY (INHALATION) at 02:51

## 2019-03-27 RX ADMIN — AMOXICILLIN AND CLAVULANATE POTASSIUM SCH TAB: 875; 125 TABLET, FILM COATED ORAL at 20:56

## 2019-03-27 RX ADMIN — IPRATROPIUM BROMIDE AND ALBUTEROL SULFATE SCH ML: .5; 3 SOLUTION RESPIRATORY (INHALATION) at 19:19

## 2019-03-27 RX ADMIN — IPRATROPIUM BROMIDE AND ALBUTEROL SULFATE SCH ML: .5; 3 SOLUTION RESPIRATORY (INHALATION) at 08:35

## 2019-03-27 RX ADMIN — METHYLPREDNISOLONE SODIUM SUCCINATE SCH MG: 40 INJECTION, POWDER, FOR SOLUTION INTRAMUSCULAR; INTRAVENOUS at 10:08

## 2019-03-27 RX ADMIN — INSULIN LISPRO SCH: 100 INJECTION, SOLUTION INTRAVENOUS; SUBCUTANEOUS at 22:00

## 2019-03-27 RX ADMIN — METHYLPREDNISOLONE SODIUM SUCCINATE SCH MG: 40 INJECTION, POWDER, FOR SOLUTION INTRAMUSCULAR; INTRAVENOUS at 20:53

## 2019-03-27 RX ADMIN — IPRATROPIUM BROMIDE AND ALBUTEROL SULFATE SCH ML: .5; 3 SOLUTION RESPIRATORY (INHALATION) at 23:37

## 2019-03-27 RX ADMIN — INSULIN LISPRO SCH UNITS: 100 INJECTION, SOLUTION INTRAVENOUS; SUBCUTANEOUS at 12:47

## 2019-03-27 RX ADMIN — AMOXICILLIN AND CLAVULANATE POTASSIUM SCH TAB: 875; 125 TABLET, FILM COATED ORAL at 10:04

## 2019-03-27 RX ADMIN — IPRATROPIUM BROMIDE AND ALBUTEROL SULFATE SCH ML: .5; 3 SOLUTION RESPIRATORY (INHALATION) at 11:32

## 2019-03-27 RX ADMIN — IPRATROPIUM BROMIDE AND ALBUTEROL SULFATE SCH ML: .5; 3 SOLUTION RESPIRATORY (INHALATION) at 16:06

## 2019-03-27 NOTE — CP.PCM.PN
Subjective





- Date & Time of Evaluation


Date of Evaluation: 03/27/19


Time of Evaluation: 09:34





- Subjective


Subjective: 





Appears to be improving slowly ,but steadily on current regimen.


Able to breathe more comfortably.


Still having some cough.


No audible wheezes, scattered rhonchi in LLs.


IgE >1200, allergen panel is pending.


Should have referral to Allergy/Immunology as outpatient.


Would probably be a good candidate for Fasenra injections.





Objective





- Vital Signs/Intake and Output


Vital Signs (last 24 hours): 


                                        











Temp Pulse Resp BP Pulse Ox


 


 97.8 F   60   18   131/74   94 L


 


 03/27/19 08:12  03/27/19 08:12  03/27/19 08:12  03/27/19 08:12  03/27/19 08:12











- Medications


Medications: 


                               Current Medications





Acetaminophen (Tylenol 325mg Tab)  650 mg PO Q6 PRN


   PRN Reason: Pain, Mild (1-3)


   Last Admin: 03/24/19 04:18 Dose:  650 mg


Albuterol/Ipratropium (Duoneb 3 Mg/0.5 Mg (3 Ml) Ud)  3 ml INH RQ4 JUDY


   Last Admin: 03/27/19 08:35 Dose:  3 ml


Amoxicillin/Clavulanate Potassium (Augmentin 875 Mg-125 Mg Tab)  1 tab PO Q12 

JUDY; Protocol


   Last Admin: 03/26/19 21:02 Dose:  1 tab


Aspirin (Ecotrin)  81 mg PO DAILY JUDY


   Last Admin: 03/26/19 08:26 Dose:  81 mg


Atorvastatin Calcium (Lipitor)  20 mg PO DAILY JUDY


   Last Admin: 03/26/19 08:26 Dose:  20 mg


Dextrose (Dextrose 50% Inj)  0 ml IV STAT PRN; Protocol


   PRN Reason: Hypoglycemia Protocol


Dextrose (Glutose 15)  0 gm PO ONCE PRN; Protocol


   PRN Reason: Hypoglycemia Protocol


Glucagon (Glucagen Diagnostic Kit)  0 mg IM STAT PRN; Protocol


   PRN Reason: Hypoglycemia Protocol


Guaifenesin/Dextromethorphan (Robitussin Dm)  10 ml PO Q6 PRN


   PRN Reason: Cough


   Last Admin: 03/23/19 01:56 Dose:  10 ml


Hydrochlorothiazide (Microzide)  12.5 mg PO DAILY JUDY


   Last Admin: 03/26/19 08:27 Dose:  12.5 mg


Methylprednisolone 30 mg/ (Sodium Chloride)  50 mls @ 100 mls/hr IV DAILY [facility]


Insulin Human Lispro (Humalog)  0 units SC ACCU-CHECK JUDY; Protocol


   Last Admin: 03/27/19 07:02 Dose:  6 units


Losartan Potassium (Cozaar)  25 mg PO DAILY [facility]


   Last Admin: 03/26/19 08:26 Dose:  25 mg


Metformin HCl (Glucophage)  500 mg PO BID [facility]


   Last Admin: 03/26/19 16:34 Dose:  500 mg


Methylprednisolone (Solu-Medrol)  30 mg IVP Q12 [facility]


   Stop: 03/27/19 23:59


   Last Admin: 03/26/19 21:28 Dose:  30 mg


Montelukast Sodium (Singulair)  10 mg PO HS [facility]


   Last Admin: 03/26/19 21:06 Dose:  10 mg


Pantoprazole Sodium (Protonix Inj)  40 mg IVP DAILY [facility]


   Last Admin: 03/26/19 08:27 Dose:  40 mg


Fluticasone/Salmeterol (Advair Diskus 500/50)  1 puff IH Q12 [facility]


   Last Admin: 03/26/19 08:25 Dose:  1 puff











- Labs


Labs: 


                                        





                                 03/27/19 04:25 





                                 03/27/19 04:25 











Assessment and Plan


(1) Maxillary sinusitis


Status: Suspected   





(2) Asthma exacerbation


Status: Acute   





(3) Bronchitis


Status: Chronic

## 2019-03-27 NOTE — CP.PCM.PN
Subjective





- Date & Time of Evaluation


Date of Evaluation: 03/27/19


Time of Evaluation: 09:21





- Subjective


Subjective: 





Patient seen and examined this morning, feeling much better though reports chest

pain in the left side while coughing. No neck pain, diaphoresis, dizziness, HA, 

sob or palpitations.


VSS, afebrile, no acute overnight events.








Objective





- Vital Signs/Intake and Output


Vital Signs (last 24 hours): 


                                        











Temp Pulse Resp BP Pulse Ox


 


 98.2 F   65   18   133/80   92 L


 


 03/27/19 11:53  03/27/19 11:53  03/27/19 11:53  03/27/19 11:53  03/27/19 11:53











- Medications


Medications: 


                               Current Medications





Acetaminophen (Tylenol 325mg Tab)  650 mg PO Q6 PRN


   PRN Reason: Pain, Mild (1-3)


   Last Admin: 03/24/19 04:18 Dose:  650 mg


Albuterol/Ipratropium (Duoneb 3 Mg/0.5 Mg (3 Ml) Ud)  3 ml INH RQ4 JUDY


   Last Admin: 03/27/19 11:32 Dose:  3 ml


Amoxicillin/Clavulanate Potassium (Augmentin 875 Mg-125 Mg Tab)  1 tab PO Q12 

JUDY; Protocol


   Last Admin: 03/27/19 10:04 Dose:  1 tab


Aspirin (Ecotrin)  81 mg PO DAILY JUDY


   Last Admin: 03/27/19 10:05 Dose:  81 mg


Atorvastatin Calcium (Lipitor)  20 mg PO DAILY JUDY


   Last Admin: 03/27/19 10:06 Dose:  20 mg


Dextrose (Dextrose 50% Inj)  0 ml IV STAT PRN; Protocol


   PRN Reason: Hypoglycemia Protocol


Dextrose (Glutose 15)  0 gm PO ONCE PRN; Protocol


   PRN Reason: Hypoglycemia Protocol


Glucagon (Glucagen Diagnostic Kit)  0 mg IM STAT PRN; Protocol


   PRN Reason: Hypoglycemia Protocol


Guaifenesin/Dextromethorphan (Robitussin Dm)  10 ml PO Q6 PRN


   PRN Reason: Cough


   Last Admin: 03/23/19 01:56 Dose:  10 ml


Hydrochlorothiazide (Microzide)  12.5 mg PO DAILY JUDY


   Last Admin: 03/27/19 10:06 Dose:  12.5 mg


Insulin Human Lispro (Humalog)  0 units SC ACCU-CHECK JUDY; Protocol


   Last Admin: 03/27/19 12:47 Dose:  6 units


Losartan Potassium (Cozaar)  25 mg PO DAILY [facility]


   Last Admin: 03/27/19 10:05 Dose:  25 mg


Metformin HCl (Glucophage)  500 mg PO BID [facility]


   Last Admin: 03/27/19 10:05 Dose:  500 mg


Methylprednisolone (Solu-Medrol)  30 mg IVP Q12 [facility]


   Stop: 03/27/19 23:59


   Last Admin: 03/27/19 10:08 Dose:  30 mg


Methylprednisolone (Medrol)  30 mg PO DAILY [facility]


Montelukast Sodium (Singulair)  10 mg PO HS [facility]


   Last Admin: 03/26/19 21:06 Dose:  10 mg


Naproxen (Naproxen)  500 mg PO Q12 [facility]


Pantoprazole Sodium (Protonix Inj)  40 mg IVP DAILY [facility]


   Last Admin: 03/27/19 10:06 Dose:  40 mg


Fluticasone/Salmeterol (Advair Diskus 500/50)  1 puff IH Q12 [facility]


   Last Admin: 03/26/19 08:25 Dose:  1 puff











- Labs


Labs: 


                                        





                                 03/27/19 04:25 





                                 03/27/19 04:25 











- Constitutional


Appears: No Acute Distress





- Head Exam


Head Exam: NORMAL INSPECTION





- Respiratory Exam


Respiratory Exam: Clear to Ausculation Bilateral, NORMAL BREATHING PATTERN.  

absent: Accessory Muscle Use





- Cardiovascular Exam


Cardiovascular Exam: REGULAR RHYTHM, +S1, +S2.  absent: Tachycardia


Additional comments: 





L sided chest reproducible pain on palpitation.





- GI/Abdominal Exam


GI & Abdominal Exam: Soft, Normal Bowel Sounds.  absent: Distended, Tenderness





- Extremities Exam


Extremities Exam: absent: Calf Tenderness, Pedal Edema





- Neurological Exam


Neurological Exam: Alert, Awake, CN II-XII Intact, Oriented x3





- Skin


Skin Exam: Dry, Warm





Assessment and Plan





- Assessment and Plan (Free Text)


Assessment: 





62 yo f with PMhx of htn, DM2, obesity, HLD, Gastric ulcer and asthma admitted 

for evaluation and management of Refractory asthma exacerbation. 


- CXR read : mild atelactasis b/l. several tiny nodular denisty on R upper lung 

that may represent small granulomata


- CT Chest on 03/11/19: Stable sub-pleural nodule less than 3 mm apex left lung.

 No change. Calcified granuloma right upper lobe 4 mm. Parabronchial thickening 

compatible with lower airway disease/bronchitis. No acute or significant 

findings related to/ accounting  for the clinical presentation.


- V/Q scan 3/26/19: low probability of PE





Plan: 


Dyspnea and Wheezing


Asthma, Severe persistent, improving


- Possibly: Reractory asthma exacerbation, ABPA, fungal infection, Kamaljit's 

pneumonia


- Pulmonology Dr Pinon consulted, recs appreciated.


- Duonebs Q4H


- taper Solu-medrol 30mg daily


- negative for aspergillus screen, Fungitel, Urine Ag for strep pneumo.  


- Sputum culture negative


- Home meds resumed


- reproducible CP, EKG NSR, no acute changes


- Monitor vital signs





Acute sinusitis


- frontal and maxilar tenderness


- afebrile, mild leukocytosis


- on Augmentin 875 BID  


- naprosyn BID





  HTN 


- controlled


- Home meds resumed


- Monitor vital signs





DM2 with hyperglycemia


- likely 2/2 to steroids, tapering


- Home meds resumed


- ISS and hypoglycemia protocol





HLD


- Home meds resumed





 Hx of gastric ulcer


- IV Pantoprazole resumed





 DVT Prophylaxis


- SCDs


- Lovenox 40mg SC daily








Case seen and examined with Dr Leroy Manzanares PGY 2

## 2019-03-27 NOTE — CARD
--------------- APPROVED REPORT --------------





Date of service: 03/27/2019



EKG Measurement

Heart [id number]

[id number]

[id number]

[id number]

[id number]



<Conclusion>

Normal sinus rhythm

Normal ECG

## 2019-03-28 VITALS
OXYGEN SATURATION: 96 % | TEMPERATURE: 98.1 F | HEART RATE: 79 BPM | SYSTOLIC BLOOD PRESSURE: 128 MMHG | DIASTOLIC BLOOD PRESSURE: 77 MMHG | RESPIRATION RATE: 16 BRPM

## 2019-03-28 RX ADMIN — AMOXICILLIN AND CLAVULANATE POTASSIUM SCH TAB: 875; 125 TABLET, FILM COATED ORAL at 09:27

## 2019-03-28 RX ADMIN — IPRATROPIUM BROMIDE AND ALBUTEROL SULFATE SCH ML: .5; 3 SOLUTION RESPIRATORY (INHALATION) at 08:14

## 2019-03-28 RX ADMIN — INSULIN LISPRO SCH UNITS: 100 INJECTION, SOLUTION INTRAVENOUS; SUBCUTANEOUS at 06:33

## 2019-03-28 RX ADMIN — INSULIN LISPRO SCH UNITS: 100 INJECTION, SOLUTION INTRAVENOUS; SUBCUTANEOUS at 12:36

## 2019-03-28 RX ADMIN — IPRATROPIUM BROMIDE AND ALBUTEROL SULFATE SCH ML: .5; 3 SOLUTION RESPIRATORY (INHALATION) at 03:38

## 2019-03-28 RX ADMIN — IPRATROPIUM BROMIDE AND ALBUTEROL SULFATE SCH ML: .5; 3 SOLUTION RESPIRATORY (INHALATION) at 15:28

## 2019-03-28 RX ADMIN — INSULIN LISPRO SCH UNITS: 100 INJECTION, SOLUTION INTRAVENOUS; SUBCUTANEOUS at 17:11

## 2019-03-28 RX ADMIN — IPRATROPIUM BROMIDE AND ALBUTEROL SULFATE SCH ML: .5; 3 SOLUTION RESPIRATORY (INHALATION) at 11:08

## 2019-03-28 NOTE — CP.PCM.DIS
Provider





- Provider


Date of Admission: 


03/24/19 16:05





Attending physician: 


Abe Harper MD





Consults: 








03/25/19 09:10


Pulmonology Consult Routine 


   Comment: 


   Consulting Provider: Ravi Pinon


   Consulting Physician: Ravi Pinon


   Reason for Consult: Asthma exacerbation











Time Spent in preparation of Discharge (in minutes): 38





Diagnosis





- Discharge Diagnosis


(1) Asthma with severe exacerbation


Status: Acute   Priority: High   





(2) Maxillary sinusitis


Status: Suspected   Priority: High   





(3) Costochondritis


Status: Acute   





(4) Diabetes mellitus


Status: Acute   





Hospital Course





- Lab Results


Lab Results: 


                                  Micro Results





03/23/19 16:49   Sputum   Gram Stain - Final


03/23/19 16:49   Sputum   Sputum Culture - Final


                            NORMAL ORAL JAZMINE





                             Most Recent Lab Values











WBC  11.0 K/uL (4.8-10.8)  H  03/27/19  04:25    


 


RBC  4.72 Mil/uL (3.80-5.20)   03/27/19  04:25    


 


Hgb  14.4 g/dL (12.0-16.0)   03/27/19  04:25    


 


Hct  42.1 % (34.0-47.0)   03/27/19  04:25    


 


MCV  89.2 fl (81.0-99.0)   03/27/19  04:25    


 


MCH  30.5 pg (27.0-31.0)   03/27/19  04:25    


 


MCHC  34.1 g/dL (33.0-37.0)   03/27/19  04:25    


 


RDW  13.4 % (11.5-14.5)   03/27/19  04:25    


 


Plt Count  327 K/uL (130-400)   03/27/19  04:25    


 


MPV  8.0 fl (7.2-11.7)   03/24/19  08:15    


 


Neut % (Auto)  89.2 % (50.0-75.0)  H  03/24/19  08:15    


 


Lymph % (Auto)  7.5 % (20.0-40.0)  L  03/24/19  08:15    


 


Mono % (Auto)  2.8 % (0.0-10.0)   03/24/19  08:15    


 


Eos % (Auto)  0.1 % (0.0-4.0)   03/24/19  08:15    


 


Baso % (Auto)  0.4 % (0.0-2.0)   03/24/19  08:15    


 


Neut # (Auto)  10.8 K/uL (1.8-7.0)  H  03/24/19  08:15    


 


Lymph # (Auto)  0.9 K/uL (1.0-4.3)  L  03/24/19  08:15    


 


Mono # (Auto)  0.3 K/uL (0.0-0.8)   03/24/19  08:15    


 


Eos # (Auto)  0.0 K/uL (0.0-0.7)   03/24/19  08:15    


 


Baso # (Auto)  0.1 K/uL (0.0-0.2)   03/24/19  08:15    


 


Neutrophils % (Manual)  90 % (42-75)  H  03/24/19  08:15    


 


Band Neutrophils %  1 % (0-2)   03/24/19  08:15    


 


Lymphocytes % (Manual)  5 % (20-50)  L  03/24/19  08:15    


 


Monocytes % (Manual)  4 % (0-10)   03/24/19  08:15    


 


Toxic Granulation  Present   03/24/19  08:15    


 


Platelet Estimate  Normal  (NORMAL)   03/24/19  08:15    


 


Sodium  135 mmol/l (132-148)   03/27/19  04:25    


 


Potassium  4.1 MMOL/L (3.6-5.0)   03/27/19  04:25    


 


Chloride  95 mmol/L ()  L  03/27/19  04:25    


 


Carbon Dioxide  27 mmol/L (22-30)   03/27/19  04:25    


 


Anion Gap  17  (10-20)   03/27/19  04:25    


 


BUN  28 mg/dl (7-17)  H  03/27/19  04:25    


 


Creatinine  0.7 mg/dl (0.7-1.2)   03/27/19  04:25    


 


Est GFR ( Amer)  > 60   03/27/19  04:25    


 


Est GFR (Non-Af Amer)  > 60   03/27/19  04:25    


 


POC Glucose (mg/dL)  286 mg/dL ()  H  03/28/19  10:47    


 


Random Glucose  365 mg/dL ()  H  03/27/19  04:25    


 


Calcium  9.7 mg/dL (8.4-10.2)   03/27/19  04:25    


 


Magnesium  2.2 MG/DL (1.6-2.3)   03/24/19  08:15    


 


Total Bilirubin  0.5 mg/dl (0.2-1.3)   03/25/19  04:55    


 


AST  17 U/L (14-36)   03/25/19  04:55    


 


ALT  29 U/L (9-52)   03/25/19  04:55    


 


Alkaline Phosphatase  110 U/L ()   03/25/19  04:55    


 


NT-Pro-B Natriuret Pep  87.2 pg/ml (0-900)   03/23/19  04:30    


 


Total Protein  7.3 G/DL (6.3-8.2)   03/25/19  04:55    


 


Albumin  3.9 g/dL (3.5-5.0)   03/25/19  04:55    


 


Globulin  3.3 gm/dL (2.2-3.9)   03/25/19  04:55    


 


Albumin/Globulin Ratio  1.2  (1.0-2.1)   03/25/19  04:55    


 


Procalcitonin  0.09 NG/ML (0.19-0.49)  L  03/23/19  04:30    


 


A. tenuis Allergen IgE  0.10 kU/L (<0.10)  H  03/25/19  10:45    


 


A. tenuis Conven Class  0/1  H  03/25/19  10:45    


 


Aspergillus fumigatus  <0.10 kU/L (<0.10)   03/25/19  10:45    


 


A. fumigatus ASM Class  0   03/25/19  10:45    


 


Cladosporium herbarum  1.67 kU/L (<0.10)  H  03/25/19  10:45    


 


C. herbarum ASM Class  2  H  03/25/19  10:45    


 


D. farinae IgE Class  2  H  03/25/19  10:45    


 


D. farinae Allrgen IgE  0.71 kU/L (<0.10)  H  03/25/19  10:45    


 


D. pteronyssinus Class  1  H  03/25/19  10:45    


 


D. pteronyssinus IgE  0.51 kU/L (<0.10)  H  03/25/19  10:45    


 


Penicillium notatum  0.11 kU/L (<0.10)  H  03/25/19  10:45    


 


P, notatum ASM Class  0/1  H  03/25/19  10:45    


 


[place] Class  2  H  03/25/19  10:45    


 


[place] Tree Allrg  2.31 kU/L (<0.10)  H  03/25/19  10:45    


 


[place] Conven Cls  2  H  03/25/19  10:45    


 


[place] Allergen  3.10 kU/L (<0.10)  H  03/25/19  10:45    


 


[place] Class  2  H  03/25/19  10:45    


 


Maple ([place] Allg  2.94 kU/L (<0.10)  H  03/25/19  10:45    


 


[place] Clss  2  H  03/25/19  10:45    


 


[place] Allerg  2.38 kU/L (<0.10)  H  03/25/19  10:45    


 


[place] Class  2  H  03/25/19  10:45    


 


[place] Class  2  H  03/25/19  10:45    


 


[place] Tree Allergen  2.93 kU/L (<0.10)  H  03/25/19  10:45    


 


[place] Tree ASM Class  2  H  03/25/19  10:45    


 


[place] Allergen  2.06 kU/L (<0.10)  H  03/25/19  10:45    


 


[place] Tree Allergen  3.24 kU/L (<0.10)  H  03/25/19  10:45    


 


[place] Class  2  H  03/25/19  10:45    


 


[place] Tree Allergen  3.09 kU/L (<0.10)  H  03/25/19  10:45    


 


[place] Class  2  H  03/25/19  10:45    


 


[place] Tree Allerg  3.19 kU/L (<0.10)  H  03/25/19  10:45    


 


[place] Clss  2  H  03/25/19  10:45    


 


[place] Allergen  2.18 kU/L (<0.10)  H  03/25/19  10:45    


 


[place] Grass Allergen  3.82 kU/L (<0.10)  H  03/25/19  10:45    


 


[place] Cl  3  H  03/25/19  10:45    


 


[place] Grass Allergen  3.05 kU/L (<0.10)  H  03/25/19  10:45    


 


[place] Grass Cnvnt Cls  2  H  03/25/19  10:45    


 


Common Pigweed Allerg  2.33 kU/L (<0.10)  H  03/25/19  10:45    


 


Common Ragweed Allergen  3.17 kU/L (<0.10)  H  03/25/19  10:45    


 


Comm Ragweed Cnvnt Cls  2  H  03/25/19  10:45    


 


Mugwort Allergen  2.58 kU/L (<0.10)  H  03/25/19  10:45    


 


Mugwort Conventional  2  H  03/25/19  10:45    


 


Pigweed Conventional  2  H  03/25/19  10:45    


 


Sheep Sorrel Allergen  3.84 kU/L (<0.10)  H  03/25/19  10:45    


 


[place]ep [place] Conven Cls  3  H  03/25/19  10:45    


 


Cat Dander Allergen  <0.10 kU/L (<0.10)   03/25/19  10:45    


 


Cat [place]ander [place] Class  0   03/25/19  10:45    


 


Dog Dander IgE Allergen  0.13 kU/L (<0.10)  H  03/25/19  10:45    


 


Dog [place]ander [place] Cls  0/1  H  03/25/19  10:45    


 


Mouse Urine Allergen  <0.10 kU/L (<0.10)   03/25/19  10:45    


 


Mouse Urine Conven Clss  0   03/25/19  10:45    


 


Cockroach Allergen  5.24 kU/L (<0.10)  H  03/25/19  10:45    


 


Cockroach [place] Clss  3  H  03/25/19  10:45    


 


Urine Color  Yellow  (YELLOW)   03/23/19  00:47    


 


Urine Clarity  Clear  (Clear)   03/23/19  00:47    


 


Urine pH  6.0  (5.0-8.0)   03/23/19  00:47    


 


Ur Specific Gravity  1.023  (1.003-1.030)   03/23/19  00:47    


 


Urine Protein  Negative mg/dL (NEGATIVE)   03/23/19  00:47    


 


Urine Glucose (UA)  150 mg/dL (NEGATIVE)   03/23/19  00:47    


 


Urine Ketones  Negative mg/dL (NEGATIVE)   03/23/19  00:47    


 


Urine Blood  Negative  (NEGATIVE)   03/23/19  00:47    


 


Urine Nitrate  Negative  (NEGATIVE)   03/23/19  00:47    


 


Urine Bilirubin  Negative  (NEGATIVE)   03/23/19  00:47    


 


Urine Urobilinogen  0.2-1.0 mg/dL (0.2-1.0)   03/23/19  00:47    


 


Ur Leukocyte Esterase  Small [place]/uL (Negative)   03/23/19  00:47    


 


Urine RBC (Auto)  1 /hpf (0-3)   03/23/19  00:47    


 


Urine Microscopic WBC  9 /hpf (0-5)  H  03/23/19  00:47    


 


Ur Squamous Epith Cells  < 1 /hpf (0-5)   03/23/19  00:47    


 


Urine Bacteria  Rare  (<OCC)   03/23/19  00:47    


 


IgE  1472 kU/L (<ca=942)  H  03/25/19  10:45    


 


HIV-1 Ab Rapid Screen  Non reactive  (NON REAC)   03/26/19  04:45    


 


Ur L.pneumophila Ag  Negative  (NEGATIVE)   03/22/19  07:00    


 


Aspergillus Antigen  Not detected  (Not Detected)   03/23/19  04:30    


 


Aspergillus Index Value  0.10  (<0.50)   03/23/19  04:30    


 


TB Test Antigen - Nil   IU/mL (())   03/23/19  04:30    


 


TB Test (QFT)  TNP   03/23/19  04:30    














- Hospital Course


Hospital Course: 





62 y/o F with a PMHx of HTN, DM2, obesity, HLD, gastric ulcer and asthma is 

admitted for evaluation and management of asthma exacerbation, SOB and wheezing.

Pt administered 2 doses of Epinephrine with no control. 


CXR :no consolidation, cardiomegaly. 


CT Chest on 03/11/19: Stable sub-pleural nodule less than 3 mm apex left lung.  

No change. Calcified granuloma right upper lobe 4 mm. Parabronchial thickening 

compatible with lower airway disease/bronchitis. No acute or significant 

findings related to/ accounting  for the clinical presentation.


V/Q: low probability of PE.


Patient started on duonebs, IV solumedrol, O2 2L NC, Admission complicated with 

acute sinusitis started on augmentin BID. Pulmonology Dr Pinon consulted. 

Aspergillus screen, Fungitel, Sputum culture, pro-calcitonin, Urine Ag for strep

pneumo and legionella negative. Sputum cx negative. Patient condition improved 

and was discharged home in stable condition. Patient with instructions to f/u 

with Pulmo, Allergist and PCP. Rx sent to pharmacy to complete 7 days of abx, 

medrol pack, rest of home meds resumed.








Discharge Exam





- Head Exam


Head Exam: NORMAL INSPECTION





- Respiratory Exam


Respiratory Exam: Clear to PA & Lateral, NORMAL BREATHING PATTERN





- Cardiovascular Exam


Cardiovascular Exam: REGULAR RHYTHM, +S1, +S2.  absent: Tachycardia





- GI/Abdominal Exam


GI & Abdominal Exam: Normal Bowel Sounds, Soft.  absent: Tenderness





- Neurological Exam


Neurological exam: Alert, CN II-XII Intact, Oriented x3





- Skin


Skin Exam: Dry, Warm





Discharge Plan





- Discharge Medications


Prescriptions: 


Methylprednisolone [Medrol Dose Pack (21 tabs)] 4 mg PO ASDIR #21 mg


Naproxen 500 mg PO Q12 14 Days #28 tab





- Follow Up Plan


Condition: IMPROVED


Disposition: HOME/ ROUTINE


Instructions:  Asthma, Adult (DC), Costochondritis (DC)


Referrals: 


[facility] [place] [Outside]


Ravi Pinon MD [Staff Provider] - 


Abe Harper MD [Staff Provider] -

## 2019-03-28 NOTE — CP.PCM.PN
Subjective





- Date & Time of Evaluation


Date of Evaluation: 03/28/19


Time of Evaluation: 10:56





- Subjective


Subjective: 





Seen sitting up in bed.


Complains of dizziness earlier this AM.


States that her breathing feels better.


On exam there are no wheezes or rales, few rhonchi.


Allery panel shows multiple potential triggers; dust, grass and trees.


She should be referred to an allergist as an outpatient.


Steroid dose again decreased to 20MG daily.


This dose reduction can continue on a daily basis (4MG each day).








Objective





- Vital Signs/Intake and Output


Vital Signs (last 24 hours): 


                                        











Temp Pulse Resp BP Pulse Ox


 


 98 F   63   18   132/79   94 L


 


 03/28/19 08:00  03/28/19 09:27  03/28/19 08:00  03/28/19 09:27  03/28/19 08:00











- Medications


Medications: 


                               Current Medications





Acetaminophen (Tylenol 325mg Tab)  650 mg PO Q6 PRN


   PRN Reason: Pain, Mild (1-3)


   Last Admin: 03/24/19 04:18 Dose:  650 mg


Albuterol/Ipratropium (Duoneb 3 Mg/0.5 Mg (3 Ml) Ud)  3 ml INH RQID JUDY


Amoxicillin/Clavulanate Potassium (Augmentin 875 Mg-125 Mg Tab)  1 tab PO Q12 

JUDY; Protocol


   Last Admin: 03/28/19 09:27 Dose:  1 tab


Aspirin (Ecotrin)  81 mg PO DAILY [facility]


   Last Admin: 03/28/19 09:28 Dose:  81 mg


Atorvastatin Calcium (Lipitor)  20 mg PO DAILY [facility]


   Last Admin: 03/28/19 09:28 Dose:  20 mg


Dextrose (Dextrose 50% Inj)  0 ml IV STAT PRN; Protocol


   PRN Reason: Hypoglycemia Protocol


Dextrose (Glutose 15)  0 gm PO ONCE PRN; Protocol


   PRN Reason: Hypoglycemia Protocol


Glucagon (Glucagen Diagnostic Kit)  0 mg IM STAT PRN; Protocol


   PRN Reason: Hypoglycemia Protocol


Guaifenesin/Dextromethorphan (Robitussin Dm)  10 ml PO Q6 PRN


   PRN Reason: Cough


   Last Admin: 03/23/19 01:56 Dose:  10 ml


Hydrochlorothiazide (Microzide)  12.5 mg PO DAILY [facility]


   Last Admin: 03/28/19 09:29 Dose:  12.5 mg


Insulin Human Lispro (Humalog)  0 units SC ACCU-CHECK JUDY; Protocol


   Last Admin: 03/28/19 06:33 Dose:  6 units


Losartan Potassium (Cozaar)  25 mg PO DAILY [facility]


   Last Admin: 03/28/19 09:27 Dose:  25 mg


Metformin HCl (Glucophage)  500 mg PO BID [facility]


   Last Admin: 03/28/19 09:28 Dose:  500 mg


Methylprednisolone (Medrol)  20 mg PO DAILY [facility]


Montelukast Sodium (Singulair)  10 mg PO HS [facility]


   Last Admin: 03/27/19 21:01 Dose:  10 mg


Naproxen (Naproxen)  500 mg PO Q12 [facility]


   Last Admin: 03/28/19 09:29 Dose:  500 mg


Pantoprazole Sodium (Protonix Inj)  40 mg IVP DAILY [facility]


   Last Admin: 03/28/19 09:29 Dose:  40 mg


Fluticasone/Salmeterol (Advair Diskus 500/50)  1 puff IH Q12 [facility]


   Last Admin: 03/26/19 08:25 Dose:  1 puff











- Labs


Labs: 


                                        





                                 03/27/19 04:25 





                                 03/27/19 04:25 











Assessment and Plan


(1) Maxillary sinusitis


Status: Suspected   





(2) Asthma exacerbation


Status: Acute   





(3) Bronchitis


Status: Chronic